# Patient Record
Sex: FEMALE | Race: WHITE | NOT HISPANIC OR LATINO | Employment: FULL TIME | URBAN - METROPOLITAN AREA
[De-identification: names, ages, dates, MRNs, and addresses within clinical notes are randomized per-mention and may not be internally consistent; named-entity substitution may affect disease eponyms.]

---

## 2017-07-31 ENCOUNTER — TELEPHONE (OUTPATIENT)
Dept: OBSTETRICS AND GYNECOLOGY | Facility: CLINIC | Age: 62
End: 2017-07-31

## 2017-08-08 ENCOUNTER — OFFICE VISIT (OUTPATIENT)
Dept: OBSTETRICS AND GYNECOLOGY | Facility: CLINIC | Age: 62
End: 2017-08-08

## 2017-08-08 VITALS
SYSTOLIC BLOOD PRESSURE: 136 MMHG | HEIGHT: 64 IN | BODY MASS INDEX: 27.49 KG/M2 | WEIGHT: 161 LBS | DIASTOLIC BLOOD PRESSURE: 90 MMHG

## 2017-08-08 DIAGNOSIS — F32.89 OTHER DEPRESSION: ICD-10-CM

## 2017-08-08 DIAGNOSIS — Z01.419 WOMEN'S ANNUAL ROUTINE GYNECOLOGICAL EXAMINATION: Primary | ICD-10-CM

## 2017-08-08 PROBLEM — F32.A DEPRESSION: Status: ACTIVE | Noted: 2017-08-08

## 2017-08-08 PROCEDURE — 99396 PREV VISIT EST AGE 40-64: CPT | Performed by: OBSTETRICS & GYNECOLOGY

## 2017-08-08 PROCEDURE — 82272 OCCULT BLD FECES 1-3 TESTS: CPT | Performed by: OBSTETRICS & GYNECOLOGY

## 2017-08-08 RX ORDER — ESTRADIOL 0.5 MG/1
0.5 TABLET ORAL DAILY
Qty: 90 TABLET | Refills: 3 | Status: SHIPPED | OUTPATIENT
Start: 2017-08-08 | End: 2018-10-04 | Stop reason: SDUPTHER

## 2017-08-08 RX ORDER — PREDNISONE 1 MG/1
TABLET ORAL
COMMUNITY
Start: 2017-07-17 | End: 2021-03-09

## 2017-08-08 RX ORDER — CITALOPRAM 20 MG/1
20 TABLET ORAL DAILY
Qty: 90 TABLET | Refills: 1 | Status: SHIPPED | OUTPATIENT
Start: 2017-08-08 | End: 2018-08-08

## 2017-08-08 NOTE — PROGRESS NOTES
"Subjective   Chief Complaint   Patient presents with   • Annual Exam   • Depression     stress     Tiffany Martinez is a 62 y.o. year old  menopausal female presenting to be seen for her annual exam.  There has not been vaginal bleeding in the last 12 months.  Hot flashes and night sweats ARE a significant problem. She has noticed more depression and crying over the [ast month or so,    SEXUAL Hx:  She is sexually active.  Vaginal dryness is not a problem.    HEALTH Hx:  She exercises regularly: no. Active at work  She wears her seat belt:yes.  She has concerns about domestic violence: no.  She has noticed changes in height: no.              Calcium intake is adequate    The following portions of the patient's history were reviewed and updated as appropriate:problem list, current medications, allergies, past family history, past medical history, past social history and past surgical history.    Smoking status: Never Smoker                                                              Smokeless status: Never Used                        Review of Systems normal bladder and bowels     Objective   /90  Ht 64\" (162.6 cm)  Wt 161 lb (73 kg)  BMI 27.64 kg/m2     General:  well developed; well nourished  no acute distress  appears stated age   Skin:  No suspicious lesions seen   Thyroid: normal to inspection and palpation   Breasts:  Examined in supine position  Symmetric without masses or skin dimpling  Nipples normal without inversion, lesions or discharge  There are no palpable axillary nodes   Abdomen: soft, non-tender; no masses  no umbilical or inginual hernias are present  no hepato-splenomegaly   Pelvis: Clinical staff was present for exam  External genitalia:  normal appearance of the external genitalia including Bartholin's and [place]'s glands.  :  urethral meatus normal; urethral hypermobility is absent.  Vaginal:  atrophic mucosal changes are present;  Cervix:  normal appearance.  Uterus:  normal size, " shape and consistency.  Adnexa:  normal bimanual exam of the adnexa.  Rectal:  anus visually normal appearing. recto-vaginal exam unremarkable and confirms findings; guaiac negative;        Assessment   1. Postmenopausal exam - hot flashes despite continued Estrace she's been on this dosage for a while.  2. Recent depression approximately month or so.  Patient does not go into great detail despite more episodes of crying.  This does not sound like anxiety.  I would like to try her on some Celexa and see if that doesn't help.  She is also having some hot flashes and this may help as well.  3. Attempt to exercise more to increase endorphins.  She is active at work but that I discussed that is not the same thing.  4. Continue calcium self breast awareness.  She is up-to-date with mammograms     Plan   1. Have her check with Dr. Dukes to see about thyroid levels - I would think he has checked  with her cholesterol.    New Medications Ordered This Visit   Medications   • predniSONE (DELTASONE) 5 MG tablet   • estradiol (ESTRACE) 0.5 MG tablet     Sig: Take 1 tablet by mouth Daily.     Dispense:  90 tablet     Refill:  3   • progesterone (PROMETRIUM) 100 MG capsule     Sig: Take 1 capsule by mouth Every Night.     Dispense:  90 capsule     Refill:  3   • citalopram (CELEXA) 20 MG tablet     Sig: Take 1 tablet by mouth Daily.     Dispense:  90 tablet     Refill:  1          This note was electronically signed.    Fer Cruz M.D.  August 8, 2017

## 2018-08-02 ENCOUNTER — TRANSCRIBE ORDERS (OUTPATIENT)
Dept: OBSTETRICS AND GYNECOLOGY | Facility: CLINIC | Age: 63
End: 2018-08-02

## 2018-08-02 DIAGNOSIS — Z12.31 VISIT FOR SCREENING MAMMOGRAM: Primary | ICD-10-CM

## 2018-08-21 ENCOUNTER — HOSPITAL ENCOUNTER (OUTPATIENT)
Dept: MAMMOGRAPHY | Facility: HOSPITAL | Age: 63
Discharge: HOME | End: 2018-08-21
Attending: OBSTETRICS & GYNECOLOGY | Admitting: OBSTETRICS & GYNECOLOGY

## 2018-08-21 DIAGNOSIS — Z12.31 VISIT FOR SCREENING MAMMOGRAM: ICD-10-CM

## 2018-08-21 PROCEDURE — 77063 BREAST TOMOSYNTHESIS BI: CPT | Performed by: RADIOLOGY

## 2018-08-21 PROCEDURE — 77067 SCR MAMMO BI INCL CAD: CPT | Performed by: RADIOLOGY

## 2018-08-21 PROCEDURE — 77067 SCR MAMMO BI INCL CAD: CPT

## 2018-08-21 PROCEDURE — 77063 BREAST TOMOSYNTHESIS BI: CPT

## 2018-10-08 RX ORDER — ESTRADIOL 0.5 MG/1
0.5 TABLET ORAL DAILY
Qty: 90 TABLET | Refills: 0 | Status: SHIPPED | OUTPATIENT
Start: 2018-10-08 | End: 2018-12-13 | Stop reason: SDUPTHER

## 2018-12-13 ENCOUNTER — OFFICE VISIT (OUTPATIENT)
Dept: OBSTETRICS AND GYNECOLOGY | Facility: CLINIC | Age: 63
End: 2018-12-13

## 2018-12-13 VITALS
BODY MASS INDEX: 25.23 KG/M2 | SYSTOLIC BLOOD PRESSURE: 118 MMHG | DIASTOLIC BLOOD PRESSURE: 72 MMHG | WEIGHT: 147 LBS | RESPIRATION RATE: 16 BRPM

## 2018-12-13 DIAGNOSIS — N39.3 SUI (STRESS URINARY INCONTINENCE, FEMALE): ICD-10-CM

## 2018-12-13 DIAGNOSIS — N95.1 MENOPAUSAL SYMPTOMS: ICD-10-CM

## 2018-12-13 DIAGNOSIS — Z12.39 ENCOUNTER FOR OTHER SCREENING FOR MALIGNANT NEOPLASM OF BREAST: ICD-10-CM

## 2018-12-13 DIAGNOSIS — Z01.419 WOMEN'S ANNUAL ROUTINE GYNECOLOGICAL EXAMINATION: Primary | ICD-10-CM

## 2018-12-13 PROBLEM — K63.5 COLON POLYP: Status: ACTIVE | Noted: 2018-12-13

## 2018-12-13 PROBLEM — M19.90 ARTHRITIS: Status: ACTIVE | Noted: 2018-12-13

## 2018-12-13 PROCEDURE — 82272 OCCULT BLD FECES 1-3 TESTS: CPT | Performed by: OBSTETRICS & GYNECOLOGY

## 2018-12-13 PROCEDURE — 99396 PREV VISIT EST AGE 40-64: CPT | Performed by: OBSTETRICS & GYNECOLOGY

## 2018-12-13 RX ORDER — ESTRADIOL 0.5 MG/1
0.5 TABLET ORAL DAILY
Qty: 90 TABLET | Refills: 3 | Status: SHIPPED | OUTPATIENT
Start: 2018-12-13 | End: 2019-12-23

## 2018-12-13 NOTE — PROGRESS NOTES
Subjective   Chief Complaint   Patient presents with   • Annual Exam     Tiffany Martinez is a 63 y.o. year old  menopausal female presenting to be seen for her annual exam.  There has not been vaginal bleeding in the last 12 months.  Hot flashes and night sweats are not a significant problem.  EMILIA persits and is a daily issue.  and Kege's not helping also overflow ; double voiding discussed particularly in the morning.    SEXUAL Hx:  She is sexually active.  Vaginal dryness is not a problem.  No pain during intercourse.    HEALTH Hx:  She exercises regularly: yes.  She wears her seat belt:yes.  She has concerns about domestic violence: no.  She has noticed changes in height: no.              Calcium intake is not adequate              Caffeine intake:moderate    The following portions of the patient's history were reviewed and updated as appropriate:problem list, current medications, allergies, past family history, past medical history, past social history and past surgical history.    Social History    Tobacco Use      Smoking status: Never Smoker      Smokeless tobacco: Never Used    Review of Systems   Her bowels are normal     Objective   /72   Resp 16   Wt 66.7 kg (147 lb)   BMI 25.23 kg/m²      General:  well developed; well nourished  no acute distress  appears stated age   Skin:  No suspicious lesions seen   Thyroid: not examined   Breasts:  Examined in supine position  Symmetric without masses or skin dimpling  Nipples normal without inversion, lesions or discharge  There are no palpable axillary nodes   Abdomen: soft, non-tender; no masses  no umbilical or inguinal hernias are present  no hepato-splenomegaly   Pelvis: Clinical staff was present for exam  External genitalia:  normal appearance of the external genitalia including Bartholin's and [place]'s glands.  :  urethral meatus normal;  Vaginal:  atrophic mucosal changes are present;  Uterus:  normal size, shape and consistency.  Adnexa:   non palpable bilaterally.  Rectal:  anus visually normal appearing. recto-vaginal exam unremarkable and confirms findings; no masses; guaiac negative;       Lab Review   Pap test- Mammogram 2018        Assessment   1. Normal postmenopausal examination with exception of;  2. Stress and overflow incontinence.  She is unable to do a Kegel exercise today.  Wears a daily pad.  Discussed double voiding as particularly in the morning to completely empty her bladder.  We'll have her try to do Kegel exercises.  I don't think these are interfering with activities of daily living enough that she would want to have surgery.  Timed voiding discussed to avoid overflow leakage  3. Colonoscopy up-to-date polyp noted 2015  4. She gets her DEXA scans and [facility].     Plan   1. Calcium discussed  1200 mg daily in divided doses ideally in diet  2. Regular weight bearing exercise  3. Breast self awareness, mammograms discussed  4. Annual or sooner as needed  5. I would continue estrogen/progesterone assessment will help avoid dyspareunia and may help with bladder support incontinence issues.    New Medications Ordered This Visit   Medications   • estradiol (ESTRACE) 0.5 MG tablet     Sig: Take 1 tablet by mouth Daily.     Dispense:  90 tablet     Refill:  3     No more refills til appt is made.   • progesterone (PROMETRIUM) 100 MG capsule     Sig: Take 1 capsule by mouth Every Night.     Dispense:  90 capsule     Refill:  3           This note was electronically signed.    Fer Cruz M.D.  December 13, 2018

## 2019-12-23 RX ORDER — ESTRADIOL 0.5 MG/1
TABLET ORAL
Qty: 90 TABLET | Refills: 0 | Status: SHIPPED | OUTPATIENT
Start: 2019-12-23 | End: 2020-01-06

## 2020-01-06 ENCOUNTER — OFFICE VISIT (OUTPATIENT)
Dept: OBSTETRICS AND GYNECOLOGY | Facility: CLINIC | Age: 65
End: 2020-01-06

## 2020-01-06 VITALS
DIASTOLIC BLOOD PRESSURE: 80 MMHG | BODY MASS INDEX: 26.29 KG/M2 | HEIGHT: 64 IN | SYSTOLIC BLOOD PRESSURE: 130 MMHG | WEIGHT: 154 LBS

## 2020-01-06 DIAGNOSIS — M85.80 OSTEOPENIA, SENILE: ICD-10-CM

## 2020-01-06 DIAGNOSIS — Z12.39 SCREENING FOR BREAST CANCER: ICD-10-CM

## 2020-01-06 DIAGNOSIS — N39.3 SUI (STRESS URINARY INCONTINENCE, FEMALE): ICD-10-CM

## 2020-01-06 DIAGNOSIS — Z01.419 ENCOUNTER FOR WELL WOMAN EXAM WITH ROUTINE GYNECOLOGICAL EXAM: Primary | ICD-10-CM

## 2020-01-06 PROCEDURE — 99396 PREV VISIT EST AGE 40-64: CPT | Performed by: OBSTETRICS & GYNECOLOGY

## 2020-01-06 RX ORDER — MELOXICAM 15 MG/1
TABLET ORAL
COMMUNITY
Start: 2019-12-19

## 2020-01-06 RX ORDER — ALENDRONATE SODIUM 70 MG/1
TABLET ORAL
COMMUNITY
Start: 2019-12-19 | End: 2022-12-29

## 2020-01-06 RX ORDER — ROSUVASTATIN CALCIUM 5 MG/1
TABLET, COATED ORAL
COMMUNITY
End: 2022-12-29

## 2020-01-06 RX ORDER — FAMOTIDINE 20 MG/1
TABLET, FILM COATED ORAL
COMMUNITY
End: 2023-03-01

## 2020-01-06 RX ORDER — PREDNISONE 1 MG/1
TABLET ORAL
COMMUNITY
Start: 2019-12-19 | End: 2023-03-01

## 2020-01-06 RX ORDER — ALBUTEROL SULFATE 90 UG/1
AEROSOL, METERED RESPIRATORY (INHALATION)
COMMUNITY
End: 2023-03-01

## 2020-01-06 RX ORDER — ESTRADIOL 1 MG/1
1 TABLET ORAL DAILY
Qty: 90 TABLET | Refills: 3 | Status: SHIPPED | OUTPATIENT
Start: 2020-01-06 | End: 2021-01-04

## 2020-01-06 RX ORDER — ERGOCALCIFEROL 1.25 MG/1
1 CAPSULE ORAL
COMMUNITY
Start: 2019-12-26 | End: 2023-03-01

## 2020-01-06 NOTE — PROGRESS NOTES
Subjective   Chief Complaint   Patient presents with   • Annual Exam     Tiffany Martinez is a 64 y.o. year old  menopausal female presenting to be seen for her annual exam.  There has not been vaginal bleeding in the last 12 months.  Hot flashes and night sweats ARE a significant problem especially at night sleeps without covers   She would like to consider increasing the dosage as this is causing problems with sleep.  Her  has to turn up the heat for him to sleep and that makes things worse for her.  Her stress incontinence is about the same.  Only really bad when she has a bad cough she does have cough medication at home.  Discussed that we could call in Tessalon Perles if the medication she has at home was ineffective.  Kegel's have not been helpful for her.  Problems with constipation related to diet.  She had a bone density study at rheumatology Dr. Wong she thinks that showed osteopenia.  I may want to get a copy that to us for our records.  We will give her some information regarding calcium diet information and discussed weightbearing exercise in addition to calcium and vitamin D as apparently that was low as well.    SEXUAL Hx:  She is sexually active.  Vaginal dryness is a problem.  Alianza is painful:no  She has concerns about domestic violence: no    HEALTH Hx:  She exercises regularly: yes.  She wears her seat belt:yes.  Self breast awareness: yes  She has noticed changes in height: no              Calcium intake is not adequate 1 daily              Caffeine intake: caffeine use is moderate-to-high daily; 2 coffee and  16 oz tea no lex     The following portions of the patient's history were reviewed and updated as appropriate:problem list, current medications, allergies, past family history, past medical history, past social history and past surgical history.    Current Outpatient Medications:   •  albuterol sulfate  (90 Base) MCG/ACT inhaler, albuterol sulfate HFA 90  mcg/actuation aerosol inhaler, Disp: , Rfl:   •  alendronate (FOSAMAX) 70 MG tablet, , Disp: , Rfl:   •  amLODIPine (NORVASC) 5 MG tablet, Take 5 mg by mouth daily., Disp: , Rfl:   •  DEXILANT 60 MG capsule, Take 60 mg by mouth daily., Disp: , Rfl:   •  diclofenac (VOLTAREN) 1 % gel gel, Apply 1 g topically daily., Disp: , Rfl:   •  ENBREL SURECLICK 50 MG/ML solution auto-injector, Inject 50 mg under the skin every 7 days., Disp: , Rfl:   •  famotidine (PEPCID) 20 MG tablet, Pepcid 20 mg tablet  Daily, Disp: , Rfl:   •  folic acid (FOLVITE) 1 MG tablet, Take 1 mg by mouth 5 (five) times a day., Disp: , Rfl:   •  meloxicam (MOBIC) 15 MG tablet, meloxicam 15 mg tablet, Disp: , Rfl:   •  methotrexate 2.5 MG tablet, Take 2.5 mg by mouth 1 (one) time as needed. 8 tabs weekly, Disp: , Rfl:   •  methotrexate 2.5 MG tablet, methotrexate sodium 2.5 mg tablet, Disp: , Rfl:   •  omeprazole (PriLOSEC) 20 MG capsule, Take 20 mg by mouth daily., Disp: , Rfl:   •  pilocarpine (SALAGEN) 5 MG tablet, Take 5 mg by mouth daily., Disp: , Rfl:   •  predniSONE (DELTASONE) 1 MG tablet, prednisone 1 mg tablet, Disp: , Rfl:   •  predniSONE (DELTASONE) 5 MG tablet, , Disp: , Rfl:   •  rosuvastatin (CRESTOR) 5 MG tablet, rosuvastatin 5 mg tablet, Disp: , Rfl:   •  vitamin D (ERGOCALCIFEROL) 1.25 MG (31992 UT) capsule capsule, Take 1 capsule by mouth., Disp: , Rfl:   •  estradiol (ESTRACE) 1 MG tablet, Take 1 tablet by mouth Daily., Disp: 90 tablet, Rfl: 3  •  progesterone (PROMETRIUM) 200 MG capsule, Take 1 capsule by mouth Daily., Disp: 90 capsule, Rfl: 3    Social History    Tobacco Use      Smoking status: Never Smoker      Smokeless tobacco: Never Used    Social History     Substance and Sexual Activity   Alcohol Use Yes    Comment: wine       Review of systems  Constitutional   POS night sweats                           NEG fatigue, fevers and malaise  Breast               POS nothing reported                           NEG persistent  "breast lump, skin dimpling or nipple discharge  GI                     POS constipation (chronic) and manages with diet                           NEG bloating, change in bowel habits, melena or reflux symptoms                      POS EMILIA is present but it IS NOT effecting her ADL's                           NEG dysuria, frequency or hematuria         Objective   /80   Ht 161.9 cm (63.75\")   Wt 69.9 kg (154 lb)   Breastfeeding No   BMI 26.64 kg/m²      General:  well developed; well nourished  no acute distress  appears stated age   Skin:  No suspicious lesions seen   Thyroid: not examined   Breasts:  Examined in supine position  Symmetric without masses or skin dimpling  Nipples normal without inversion, lesions or discharge  Fibrocystic changes are present both breasts without a discrete mass   Abdomen: soft, non-tender; no masses  no umbilical or inguinal hernias are present  no hepato-splenomegaly   Pelvis: Clinical staff was present for exam  External genitalia:  normal appearance of the external genitalia including Bartholin's and [place]'s glands.  :  urethral meatus normal;  Vaginal:  atrophic mucosal changes are present; she is not able to perform a Kegel contraction upon request;  Cervix:  normal appearance. Pap obtained  Uterus:  normal size, shape and consistency.  Adnexa:  non palpable bilaterally.       Lab Review   Pap test and PATHOLOGY    Imaging review  Mammogram report  Pelvic ultrasound report       Assessment   1. Postmenopausal examination status post ablation 2016.  She had postmenopausal bleeding due to endometrial polyps.  No bleeding since then.  2. Discussed Pap smear screening can stop about age 66 she may have had an abnormal one in the past.  Potentially she could do one in 2 or 3 years and if they are normal she can stop at that point.  3. Osteopenia managed by rheumatology.  I would like to get a copy of the report will give her some information regarding calcium in her " diet  4. Symptomatic hot flashes will increase dose of estradiol and Prometrium to take at night  5. Last mammogram was in August 2018 so we will get that scheduled.  6. She has a new primary care doctor at [facility] as Dr. Dukes has retired.       Plan   1. Annual or sooner as needed  2. Calcium discussed  1200 mg daily in divided doses ideally in diet  3. Regular weight bearing exercise  4. Breast self awareness, mammograms discussed  5. Colonoscopy discussed  6.       New Medications Ordered This Visit   Medications   • estradiol (ESTRACE) 1 MG tablet     Sig: Take 1 tablet by mouth Daily.     Dispense:  90 tablet     Refill:  3   • progesterone (PROMETRIUM) 200 MG capsule     Sig: Take 1 capsule by mouth Daily.     Dispense:  90 capsule     Refill:  3      Orders Placed This Encounter   Procedures   • Mammo Screening Digital Tomosynthesis Bilateral With CAD     Standing Status:   Future     Standing Expiration Date:   1/6/2021     Order Specific Question:   Reason for Exam:     Answer:   s              This note was electronically signed.    Fer Cruz M.D.  January 6, 2020

## 2021-01-04 RX ORDER — ESTRADIOL 1 MG/1
TABLET ORAL
Qty: 30 TABLET | Refills: 0 | Status: SHIPPED | OUTPATIENT
Start: 2021-01-04 | End: 2021-03-08

## 2021-03-02 PROBLEM — N83.201 RIGHT OVARIAN CYST: Status: ACTIVE | Noted: 2021-03-02

## 2021-03-08 RX ORDER — ESTRADIOL 1 MG/1
TABLET ORAL
Qty: 30 TABLET | Refills: 0 | Status: SHIPPED | OUTPATIENT
Start: 2021-03-08 | End: 2021-03-09 | Stop reason: SDUPTHER

## 2021-03-09 ENCOUNTER — OFFICE VISIT (OUTPATIENT)
Dept: OBSTETRICS AND GYNECOLOGY | Facility: CLINIC | Age: 66
End: 2021-03-09

## 2021-03-09 VITALS
DIASTOLIC BLOOD PRESSURE: 70 MMHG | SYSTOLIC BLOOD PRESSURE: 128 MMHG | BODY MASS INDEX: 27.16 KG/M2 | WEIGHT: 157 LBS | RESPIRATION RATE: 16 BRPM

## 2021-03-09 DIAGNOSIS — Z01.411 ENCOUNTER FOR GYNECOLOGICAL EXAMINATION WITH ABNORMAL FINDING: ICD-10-CM

## 2021-03-09 DIAGNOSIS — N83.201 RIGHT OVARIAN CYST: Primary | ICD-10-CM

## 2021-03-09 DIAGNOSIS — Z12.31 ENCOUNTER FOR SCREENING MAMMOGRAM FOR MALIGNANT NEOPLASM OF BREAST: ICD-10-CM

## 2021-03-09 DIAGNOSIS — N95.1 MENOPAUSAL SYMPTOMS: ICD-10-CM

## 2021-03-09 DIAGNOSIS — N39.3 SUI (STRESS URINARY INCONTINENCE, FEMALE): ICD-10-CM

## 2021-03-09 DIAGNOSIS — M85.80 OSTEOPENIA, SENILE: ICD-10-CM

## 2021-03-09 PROCEDURE — 99396 PREV VISIT EST AGE 40-64: CPT | Performed by: OBSTETRICS & GYNECOLOGY

## 2021-03-09 RX ORDER — ESTRADIOL 1 MG/1
1 TABLET ORAL DAILY
Qty: 90 TABLET | Refills: 0 | Status: SHIPPED | OUTPATIENT
Start: 2021-03-09

## 2021-03-09 RX ORDER — ESTRADIOL 1 MG/1
1 TABLET ORAL DAILY
Qty: 90 TABLET | Refills: 3 | Status: SHIPPED | OUTPATIENT
Start: 2021-03-09 | End: 2021-03-09

## 2021-03-09 NOTE — PROGRESS NOTES
Subjective   Chief Complaint   Patient presents with   • Annual Exam   • Ovarian Cyst     Tiffany Martinez is a 65 y.o. year old  menopausal female presenting to be seen for her annual exam.  There has not been vaginal bleeding in the last 12 months.  Hot flashes and night sweats are not a significant problem.  As long she is on Estrace and Prometrium.  She is also on Fosamax/alendronate for osteopenia.  Discussed calcium, vitamin D and avoidance of excessive caffeine along with exercise.   She recently had a colonoscopy at [facility] by Dr. Arceo.  Polyp was removed.  CT was ordered and 4.5 cm right ovarian simple cyst was noted.  Upon review in 2016 on ultrasound she also had a 4.4 cm right ovarian cyst that was simple.  Likely unchanged.  We can check a CA-125 level to be on the safe side.  Apparently that might not have been done in 2016.  She is up-to-date on Pap testing  Has not had a mammogram since 2018 although one was ordered in 2020?    SEXUAL Hx:  She is sexually active.  Vaginal dryness is a problem.  Marcy is painful:yes a little bit ; OTC lubricants help  She has concerns about domestic violence: no  Discussed risk for STD and sexual behavior.    HEALTH Hx:  She exercises regularly: yes.  She wears her seat belt:yes.  Self breast awareness: yes  She has noticed changes in height: yes 0.5 inches?               Calcium intake is adequate 4 daily              Caffeine intake: caffeine use is moderate-to-high daily              Discussed immunizations, screenings, mental and dental health.    The following portions of the patient's history were reviewed and updated as appropriate:problem list, current medications, allergies, past family history, past medical history, past social history and past surgical history.      Current Outpatient Medications:   •  albuterol sulfate  (90 Base) MCG/ACT inhaler, albuterol sulfate HFA 90 mcg/actuation aerosol inhaler, Disp: , Rfl:   •   alendronate (FOSAMAX) 70 MG tablet, , Disp: , Rfl:   •  amLODIPine (NORVASC) 5 MG tablet, Take 5 mg by mouth daily., Disp: , Rfl:   •  DEXILANT 60 MG capsule, Take 60 mg by mouth daily., Disp: , Rfl:   •  diclofenac (VOLTAREN) 1 % gel gel, Apply 1 g topically daily., Disp: , Rfl:   •  ENBREL SURECLICK 50 MG/ML solution auto-injector, Inject 50 mg under the skin every 7 days., Disp: , Rfl:   •  famotidine (PEPCID) 20 MG tablet, Pepcid 20 mg tablet  Daily, Disp: , Rfl:   •  folic acid (FOLVITE) 1 MG tablet, Take 1 mg by mouth 5 (five) times a day., Disp: , Rfl:   •  meloxicam (MOBIC) 15 MG tablet, meloxicam 15 mg tablet, Disp: , Rfl:   •  methotrexate 2.5 MG tablet, Take 2.5 mg by mouth 1 (one) time as needed. 8 tabs weekly, Disp: , Rfl:   •  omeprazole (PriLOSEC) 20 MG capsule, Take 20 mg by mouth daily., Disp: , Rfl:   •  pilocarpine (SALAGEN) 5 MG tablet, Take 5 mg by mouth daily., Disp: , Rfl:   •  predniSONE (DELTASONE) 1 MG tablet, prednisone 1 mg tablet, Disp: , Rfl:   •  rosuvastatin (CRESTOR) 5 MG tablet, rosuvastatin 5 mg tablet, Disp: , Rfl:   •  vitamin D (ERGOCALCIFEROL) 1.25 MG (89365 UT) capsule capsule, Take 1 capsule by mouth., Disp: , Rfl:   •  estradiol (Estrace) 1 MG tablet, Take 1 tablet by mouth Daily., Disp: 90 tablet, Rfl: 0  •  progesterone (Prometrium) 200 MG capsule, Take 1 capsule by mouth Every Night., Disp: 90 capsule, Rfl: 0    Social History    Tobacco Use      Smoking status: Current Every Day Smoker        Packs/day: 1.00      Smokeless tobacco: Never Used      Tobacco comment: going to quit  she plans to start using a patch to help her quit smoking.    Social History     Substance and Sexual Activity   Alcohol Use Not Currently     Discussed avoidance of illicit drugs    Review of systems  Constitutional   POS weight gain                           NEG fatigue, fevers, malaise and night sweats  Breast               POS nothing reported                           NEG persistent breast  lump, skin dimpling or nipple discharge  GI                     POS nothing reported and had colonoscopy in the past 10 year - results are not in record for review                           NEG bloating, change in bowel habits, melena or reflux symptoms                      POS EMILIA is present but it IS NOT effecting her ADL's more first thing in the morning; no daily pad                           NEG dysuria, frequency or hematuria           Objective   /70   Resp 16   Wt 71.2 kg (157 lb)   BMI 27.16 kg/m²      General:  well developed; well nourished  no acute distress  appears stated age   Skin:  No suspicious lesions seen   Thyroid: not examined   Breasts:  Examined in supine position  Symmetric without masses or skin dimpling  Nipples normal without inversion, lesions or discharge  Fibrocystic changes are present both breasts without a discrete mass   Abdomen: soft, non-tender; no masses  no umbilical or inguinal hernias are present  no hepato-splenomegaly   Pelvis: Clinical staff was present for exam  External genitalia:  normal appearance of the external genitalia including Bartholin's and [place]'s glands.  :  urethral meatus normal;  Vaginal:  normal pink mucosa without prolapse or lesions. caliber of the introitus is Slightly narrowed; she is not able to perform a Kegel contraction upon request;  Cervix:  cervical motion tenderness is absent;  Uterus:  normal size, shape and consistency.  Adnexa:  There is a fullness on the right side.  Rectal:  digital rectal exam not performed; anus visually normal appearing.       Lab Review   Pap test and PATHOLOGY    Imaging review  CT of abdomen/pelvis report  Mammogram report  Pelvic ultrasound report   [facility] scan [facility]               Assessment     1. Normal postmenopausal examination with the exception of  2. 4.5 cm simple right ovarian cyst seen on recent CT scan.  Apparently same size as a ultrasound in 2016  3. Occasional stress urinary  incontinence especially in the morning suggest a double voiding.  She is unable to do a Kegel.  Occasionally has to wear a pad.  Discussed options if this worsens would include trial of a pessary versus surgery  4. Osteopenia BN treated at the [facility] Center  5.    Up-to-date on colonoscopy, needs mammogram       Plan     1. Annual or sooner as needed  2. Calcium discussed  1200 mg daily in divided doses ideally in diet  3. Regular weight bearing exercise, nutrition, injury avoidance and maintaining healthy weight discussed  4. Breast self awareness and mammograms discussed  5. Colonoscopy discussed  6. I will refill HRT for 3 months to give her time to have her mammogram completed.    New Medications Ordered This Visit   Medications   • estradiol (Estrace) 1 MG tablet     Sig: Take 1 tablet by mouth Daily.     Dispense:  90 tablet     Refill:  0   • progesterone (Prometrium) 200 MG capsule     Sig: Take 1 capsule by mouth Every Night.     Dispense:  90 capsule     Refill:  0      Orders Placed This Encounter   Procedures   • Mammo Screening Digital Tomosynthesis Bilateral With CAD     Standing Status:   Future     Standing Expiration Date:   3/9/2022     Order Specific Question:   Reason for Exam:     Answer:   l   •      Standing Status:   Future     Standing Expiration Date:   3/9/2022              This note was electronically signed.    Fer Cruz M.D.  March 9, 2021

## 2022-10-08 ENCOUNTER — HOSPITAL ENCOUNTER (EMERGENCY)
Facility: HOSPITAL | Age: 67
Discharge: HOME | End: 2022-10-08
Attending: EMERGENCY MEDICINE | Admitting: EMERGENCY MEDICINE

## 2022-10-08 ENCOUNTER — APPOINTMENT (OUTPATIENT)
Dept: CT IMAGING | Facility: HOSPITAL | Age: 67
End: 2022-10-08

## 2022-10-08 VITALS
SYSTOLIC BLOOD PRESSURE: 177 MMHG | HEART RATE: 67 BPM | RESPIRATION RATE: 20 BRPM | HEIGHT: 64 IN | BODY MASS INDEX: 27.66 KG/M2 | WEIGHT: 162 LBS | DIASTOLIC BLOOD PRESSURE: 66 MMHG | OXYGEN SATURATION: 99 % | TEMPERATURE: 98.2 F

## 2022-10-08 DIAGNOSIS — N83.201 BILATERAL OVARIAN CYSTS: ICD-10-CM

## 2022-10-08 DIAGNOSIS — N20.1 RIGHT URETERAL STONE: Primary | ICD-10-CM

## 2022-10-08 DIAGNOSIS — N83.202 BILATERAL OVARIAN CYSTS: ICD-10-CM

## 2022-10-08 DIAGNOSIS — N23 RENAL COLIC ON RIGHT SIDE: ICD-10-CM

## 2022-10-08 LAB
ALBUMIN SERPL-MCNC: 4.2 G/DL (ref 3.5–5.2)
ALBUMIN/GLOB SERPL: 1.4 G/DL
ALP SERPL-CCNC: 76 U/L (ref 39–117)
ALT SERPL W P-5'-P-CCNC: 11 U/L (ref 1–33)
ANION GAP SERPL CALCULATED.3IONS-SCNC: 12.9 MMOL/L (ref 5–15)
AST SERPL-CCNC: 20 U/L (ref 1–32)
BACTERIA UR QL AUTO: ABNORMAL /HPF
BASOPHILS # BLD AUTO: 0.03 10*3/MM3 (ref 0–0.2)
BASOPHILS NFR BLD AUTO: 0.3 % (ref 0–1.5)
BILIRUB SERPL-MCNC: 0.4 MG/DL (ref 0–1.2)
BILIRUB UR QL STRIP: NEGATIVE
BUN SERPL-MCNC: 18 MG/DL (ref 8–23)
BUN/CREAT SERPL: 19.4 (ref 7–25)
CALCIUM SPEC-SCNC: 9.6 MG/DL (ref 8.6–10.5)
CHLORIDE SERPL-SCNC: 105 MMOL/L (ref 98–107)
CLARITY UR: CLEAR
CO2 SERPL-SCNC: 22.1 MMOL/L (ref 22–29)
COLOR UR: YELLOW
CREAT SERPL-MCNC: 0.93 MG/DL (ref 0.57–1)
DEPRECATED RDW RBC AUTO: 46.2 FL (ref 37–54)
EGFRCR SERPLBLD CKD-EPI 2021: 67.9 ML/MIN/1.73
EOSINOPHIL # BLD AUTO: 0.01 10*3/MM3 (ref 0–0.4)
EOSINOPHIL NFR BLD AUTO: 0.1 % (ref 0.3–6.2)
ERYTHROCYTE [DISTWIDTH] IN BLOOD BY AUTOMATED COUNT: 13.4 % (ref 12.3–15.4)
GLOBULIN UR ELPH-MCNC: 3.1 GM/DL
GLUCOSE SERPL-MCNC: 115 MG/DL (ref 65–99)
GLUCOSE UR STRIP-MCNC: NEGATIVE MG/DL
HCT VFR BLD AUTO: 40.5 % (ref 34–46.6)
HGB BLD-MCNC: 13.9 G/DL (ref 12–15.9)
HGB UR QL STRIP.AUTO: ABNORMAL
HYALINE CASTS UR QL AUTO: ABNORMAL /LPF
IMM GRANULOCYTES # BLD AUTO: 0.04 10*3/MM3 (ref 0–0.05)
IMM GRANULOCYTES NFR BLD AUTO: 0.4 % (ref 0–0.5)
KETONES UR QL STRIP: ABNORMAL
LEUKOCYTE ESTERASE UR QL STRIP.AUTO: NEGATIVE
LIPASE SERPL-CCNC: 21 U/L (ref 13–60)
LYMPHOCYTES # BLD AUTO: 0.78 10*3/MM3 (ref 0.7–3.1)
LYMPHOCYTES NFR BLD AUTO: 7.8 % (ref 19.6–45.3)
MCH RBC QN AUTO: 32 PG (ref 26.6–33)
MCHC RBC AUTO-ENTMCNC: 34.3 G/DL (ref 31.5–35.7)
MCV RBC AUTO: 93.3 FL (ref 79–97)
MONOCYTES # BLD AUTO: 0.55 10*3/MM3 (ref 0.1–0.9)
MONOCYTES NFR BLD AUTO: 5.5 % (ref 5–12)
NEUTROPHILS NFR BLD AUTO: 8.54 10*3/MM3 (ref 1.7–7)
NEUTROPHILS NFR BLD AUTO: 85.9 % (ref 42.7–76)
NITRITE UR QL STRIP: NEGATIVE
NRBC BLD AUTO-RTO: 0 /100 WBC (ref 0–0.2)
PH UR STRIP.AUTO: 6.5 [PH] (ref 5–8)
PLATELET # BLD AUTO: 209 10*3/MM3 (ref 140–450)
PMV BLD AUTO: 10 FL (ref 6–12)
POTASSIUM SERPL-SCNC: 3.9 MMOL/L (ref 3.5–5.2)
PROT SERPL-MCNC: 7.3 G/DL (ref 6–8.5)
PROT UR QL STRIP: NEGATIVE
RBC # BLD AUTO: 4.34 10*6/MM3 (ref 3.77–5.28)
RBC # UR STRIP: ABNORMAL /HPF
REF LAB TEST METHOD: ABNORMAL
SODIUM SERPL-SCNC: 140 MMOL/L (ref 136–145)
SP GR UR STRIP: 1.01 (ref 1–1.03)
SQUAMOUS #/AREA URNS HPF: ABNORMAL /HPF
UROBILINOGEN UR QL STRIP: ABNORMAL
WBC # UR STRIP: ABNORMAL /HPF
WBC NRBC COR # BLD: 9.95 10*3/MM3 (ref 3.4–10.8)

## 2022-10-08 PROCEDURE — 83690 ASSAY OF LIPASE: CPT | Performed by: PHYSICIAN ASSISTANT

## 2022-10-08 PROCEDURE — 81001 URINALYSIS AUTO W/SCOPE: CPT | Performed by: PHYSICIAN ASSISTANT

## 2022-10-08 PROCEDURE — 74176 CT ABD & PELVIS W/O CONTRAST: CPT

## 2022-10-08 PROCEDURE — 85025 COMPLETE CBC W/AUTO DIFF WBC: CPT | Performed by: PHYSICIAN ASSISTANT

## 2022-10-08 PROCEDURE — 99283 EMERGENCY DEPT VISIT LOW MDM: CPT

## 2022-10-08 PROCEDURE — 25010000002 KETOROLAC TROMETHAMINE PER 15 MG: Performed by: PHYSICIAN ASSISTANT

## 2022-10-08 PROCEDURE — 25010000002 ONDANSETRON PER 1 MG: Performed by: PHYSICIAN ASSISTANT

## 2022-10-08 PROCEDURE — 80053 COMPREHEN METABOLIC PANEL: CPT | Performed by: PHYSICIAN ASSISTANT

## 2022-10-08 PROCEDURE — 96375 TX/PRO/DX INJ NEW DRUG ADDON: CPT

## 2022-10-08 PROCEDURE — 96374 THER/PROPH/DIAG INJ IV PUSH: CPT

## 2022-10-08 RX ORDER — LEVOTHYROXINE SODIUM 0.05 MG/1
50 TABLET ORAL DAILY
COMMUNITY
End: 2023-03-01

## 2022-10-08 RX ORDER — KETOROLAC TROMETHAMINE 30 MG/ML
15 INJECTION, SOLUTION INTRAMUSCULAR; INTRAVENOUS ONCE
Status: COMPLETED | OUTPATIENT
Start: 2022-10-08 | End: 2022-10-08

## 2022-10-08 RX ORDER — METOPROLOL SUCCINATE 25 MG/1
25 TABLET, EXTENDED RELEASE ORAL DAILY
COMMUNITY

## 2022-10-08 RX ORDER — TAMSULOSIN HYDROCHLORIDE 0.4 MG/1
1 CAPSULE ORAL DAILY
Qty: 10 CAPSULE | Refills: 0 | Status: SHIPPED | OUTPATIENT
Start: 2022-10-08 | End: 2022-10-18

## 2022-10-08 RX ORDER — HYDROCODONE BITARTRATE AND ACETAMINOPHEN 5; 325 MG/1; MG/1
1 TABLET ORAL ONCE
Status: COMPLETED | OUTPATIENT
Start: 2022-10-08 | End: 2022-10-08

## 2022-10-08 RX ORDER — SODIUM CHLORIDE 0.9 % (FLUSH) 0.9 %
10 SYRINGE (ML) INJECTION AS NEEDED
Status: DISCONTINUED | OUTPATIENT
Start: 2022-10-08 | End: 2022-10-08 | Stop reason: HOSPADM

## 2022-10-08 RX ORDER — ONDANSETRON 4 MG/1
4 TABLET, ORALLY DISINTEGRATING ORAL EVERY 6 HOURS PRN
Qty: 10 TABLET | Refills: 0 | Status: SHIPPED | OUTPATIENT
Start: 2022-10-08 | End: 2022-10-18

## 2022-10-08 RX ORDER — HYDROCODONE BITARTRATE AND ACETAMINOPHEN 5; 325 MG/1; MG/1
1 TABLET ORAL EVERY 6 HOURS PRN
Qty: 12 TABLET | Refills: 0 | Status: SHIPPED | OUTPATIENT
Start: 2022-10-08 | End: 2022-10-11

## 2022-10-08 RX ORDER — ONDANSETRON 2 MG/ML
4 INJECTION INTRAMUSCULAR; INTRAVENOUS ONCE
Status: COMPLETED | OUTPATIENT
Start: 2022-10-08 | End: 2022-10-08

## 2022-10-08 RX ADMIN — SODIUM CHLORIDE 500 ML: 9 INJECTION, SOLUTION INTRAVENOUS at 18:26

## 2022-10-08 RX ADMIN — KETOROLAC TROMETHAMINE 15 MG: 30 INJECTION, SOLUTION INTRAMUSCULAR; INTRAVENOUS at 18:27

## 2022-10-08 RX ADMIN — ONDANSETRON 4 MG: 2 INJECTION INTRAMUSCULAR; INTRAVENOUS at 18:27

## 2022-10-08 RX ADMIN — HYDROCODONE BITARTRATE AND ACETAMINOPHEN 1 TABLET: 5; 325 TABLET ORAL at 20:11

## 2022-10-08 RX ADMIN — LIDOCAINE HYDROCHLORIDE 100 MG: 10 INJECTION, SOLUTION EPIDURAL; INFILTRATION; INTRACAUDAL; PERINEURAL at 18:49

## 2022-10-08 NOTE — DISCHARGE INSTRUCTIONS
You have a kidney stone in your right ureter which is likely causing your acute pain.  Alternate ibuprofen and Tylenol as needed.  For severe pain, may take Norco as directed.  Take Zofran as needed for nausea and vomiting.  May take Flomax to help relax the ureter to aid in passage of the stone.  Drink plenty of fluids to flush out your  system.  Try to follow-up with your gynecologist to reevaluate your bilateral ovarian cyst as scheduled.  If your pain persists or worsens, or you do not pass the kidney stone, may follow-up with our urologist Dr. Willis by calling their office for an appointment.  Return to the ER for any change, worsening symptoms, or any additional concerns including but not limited to severe or worsening pain with fever greater 100.4, inability to urinate, intractable vomiting.

## 2022-10-08 NOTE — ED PROVIDER NOTES
Subjective   History of Present Illness  Patient is a 67-year-old female history of acid reflux, arrhythmia, colon polyps, diverticulosis, hemorrhoids, hyperlipidemia, hypertension, and rheumatoid arthritis presenting to the ER for evaluation of flank pain and vomiting.  Patient states this morning she began having sharp stabbing pain in her right back that radiates around towards her abdomen and groin.  She states that it seems to come and go, intensifies at times.  She states when the pain really hits that she has some associated nausea and vomiting.  She denies any known history of kidney stones previously.  She states she is had an appendectomy, colonoscopy and  in the past.  She states that she had been having some issues with constipation recently but took Linzess and had a normal bowel movement this morning.  Denies any fever, chills, chest pain, cough, shortness of breath, dysuria, known hematuria, or any other symptoms.        Review of Systems   Constitutional: Negative for chills and fever.   HENT: Negative.    Eyes: Negative.    Respiratory: Negative.    Cardiovascular: Negative.    Gastrointestinal: Positive for abdominal pain, nausea and vomiting.   Genitourinary: Positive for flank pain.   Musculoskeletal: Positive for back pain.   Skin: Negative.    Allergic/Immunologic: Negative for immunocompromised state.   Neurological: Negative.    Psychiatric/Behavioral: Negative.        Past Medical History:   Diagnosis Date   • Acid reflux    • Arrhythmia    • Colon polyp 2015   • Diverticulosis 2015   • Hemorrhoids, internal 2015   • Hyperlipidemia    • Hypertension    • Rheumatoid arthritis (HCC)    • Shingles 2020    Left abd/flank/back       Allergies   Allergen Reactions   • Codeine Nausea And Vomiting and Hives       Past Surgical History:   Procedure Laterality Date   •  SECTION      times 2   • COLONOSCOPY W/ POLYPECTOMY  2021   • ENDOSCOPY  2021    [facility]    •  "HYSTEROSCOPY ENDOMETRIAL ABLATION  09/09/2016    D&C polypectomy [facility]   • TUBAL ABDOMINAL LIGATION Bilateral        Family History   Problem Relation Age of Onset   • Heart disease Mother    • Heart attack Father    • Stroke Father 79        on Coumadin   • No Known Problems Brother    • Breast cancer Neg Hx    • Ovarian cancer Neg Hx    • Colon cancer Neg Hx        Social History     Socioeconomic History   • Marital status:    Tobacco Use   • Smoking status: Every Day     Packs/day: 0.50     Types: Cigarettes   • Smokeless tobacco: Never   • Tobacco comments:     going to quit   Vaping Use   • Vaping Use: Never used   Substance and Sexual Activity   • Alcohol use: Not Currently   • Drug use: No   • Sexual activity: Yes     Partners: Male           Objective   Physical Exam  Vitals and nursing note reviewed.     /66 (BP Location: Left arm, Patient Position: Sitting)   Pulse 67   Temp 98.2 °F (36.8 °C) (Oral)   Resp 20   Ht 162.6 cm (64\")   Wt 73.5 kg (162 lb)   SpO2 99%   BMI 27.81 kg/m²     GEN: No acute distress, sitting upright in stretcher.  Awake and alert.  Does not appear septic or toxic.  She is answering questions appropriately.  Head: Normocephalic, atraumatic  Eyes: EOM intact  ENT: Mask in place per protocol   Chest: Nontender to palpation  Cardiovascular: Regular rate and rhythm   Lungs: Clear to auscultation bilaterally without adventitious sounds  Abdomen: Soft, nontender, nondistended, no peritoneal signs, no focal guarding  Extremities: No edema, normal appearance, full ROM.   Neuro: GCS 15  Psych: Mood and affect are appropriate    Procedures           ED Course  ED Course as of 10/09/22 0041   Sat Oct 08, 2022   1844 WBC: 9.95 [LA]   1844 Hemoglobin: 13.9 [LA]   1844 Platelets: 209 [LA]   1853 Glucose(!): 115 [LA]   1853 BUN: 18 [LA]   1853 Creatinine: 0.93 [LA]   1853 Sodium: 140 [LA]   1853 Potassium: 3.9 [LA]   1853 Chloride: 105 [LA]   1853 CO2: 22.1 [LA]   1853 " "Calcium: 9.6 [LA]   1853 Total Protein: 7.3 [LA]   1853 Albumin: 4.20 [LA]   1853 ALT (SGPT): 11 [LA]   1853 AST (SGOT): 20 [LA]   1853 Alkaline Phosphatase: 76 [LA]   1853 Total Bilirubin: 0.4 [LA]   1853 Globulin: 3.1 [LA]   1853 A/G Ratio: 1.4 [LA]   1853 BUN/Creatinine Ratio: 19.4 [LA]   1853 Anion Gap: 12.9 [LA]   1853 eGFR: 67.9 [LA]   1853 Lipase: 21 [LA]   1935 RBC, UA(!): 6-12 [LA]   1935 WBC, UA: None Seen [LA]   1935 Bacteria, UA: None Seen [LA]   1935 Squamous Epithelial Cells, UA: 0-2 [LA]   1935 Hyaline Casts, UA: None Seen [LA]   1935 Methodology:: Manual Light Microscopy [LA]   1935 Color, UA: Yellow [LA]   1935 Appearance, UA: Clear [LA]   1935 pH, UA: 6.5 [LA]   1935 Specific Gravity, UA: 1.015 [LA]   1935 Glucose: Negative [LA]   1935 Ketones, UA(!): 15 mg/dL (1+) [LA]   1935 Bilirubin, UA: Negative [LA]   1935 Blood, UA(!): Small (1+) [LA]   1935 Protein, UA: Negative [LA]   1935 Leukocytes, UA: Negative [LA]   1935 Nitrite, UA: Negative [LA]   1935 Urobilinogen, UA: 0.2 E.U./dL [LA]   1936 Ct revealed \" tiny obstructing right UVJ stone.  Small bilateral ovarian cysts\" [LA]   1948 Patient feels better after medications.  She is resting comfortably in the stretcher.  She states she knows about the ovarian cyst and is following up with gynecology in the near future.  We will give her urology follow-up as needed, pain and nausea control.  We discussed very strict return precautions.  She verbalized understanding and was in agreement with this plan of care. [LA]   1955 Patient states that she has taken Norco in the past and small doses without adverse effects. [LA]      ED Course User Index  [LA] Amira Jones PA-C      Lab Results (last 24 hours)     Procedure Component Value Units Date/Time    CBC & Differential [[id number]]  (Abnormal) Collected: 10/08/22 1826    Specimen: Blood Updated: 10/08/22 1833    Narrative:      The following orders were created for panel order CBC & " Differential.  Procedure                               Abnormality         Status                     ---------                               -----------         ------                     CBC Auto Differential[[id number]]        Abnormal            Final result                 Please view results for these tests on the individual orders.    Comprehensive Metabolic Panel [[id number]]  (Abnormal) Collected: 10/08/22 1826    Specimen: Blood Updated: 10/08/22 1852     Glucose 115 mg/dL      BUN 18 mg/dL      Creatinine 0.93 mg/dL      Sodium 140 mmol/L      Potassium 3.9 mmol/L      Chloride 105 mmol/L      CO2 22.1 mmol/L      Calcium 9.6 mg/dL      Total Protein 7.3 g/dL      Albumin 4.20 g/dL      ALT (SGPT) 11 U/L      AST (SGOT) 20 U/L      Alkaline Phosphatase 76 U/L      Total Bilirubin 0.4 mg/dL      Globulin 3.1 gm/dL      A/G Ratio 1.4 g/dL      BUN/Creatinine Ratio 19.4     Anion Gap 12.9 mmol/L      eGFR 67.9 mL/min/1.73      Comment: National Kidney Foundation and American Society of Nephrology (ASN) Task Force recommended calculation based on the Chronic Kidney Disease Epidemiology Collaboration (CKD-EPI) equation refit without adjustment for race.       Narrative:      GFR Normal >60  Chronic Kidney Disease <60  Kidney Failure <15      Lipase [[id number]]  (Normal) Collected: 10/08/22 1826    Specimen: Blood Updated: 10/08/22 1852     Lipase 21 U/L     CBC Auto Differential [[id number]]  (Abnormal) Collected: 10/08/22 1826    Specimen: Blood Updated: 10/08/22 1833     WBC 9.95 10*3/mm3      RBC 4.34 10*6/mm3      Hemoglobin 13.9 g/dL      Hematocrit 40.5 %      MCV 93.3 fL      MCH 32.0 pg      MCHC 34.3 g/dL      RDW 13.4 %      RDW-SD 46.2 fl      MPV 10.0 fL      Platelets 209 10*3/mm3      Neutrophil % 85.9 %      Lymphocyte % 7.8 %      Monocyte % 5.5 %      Eosinophil % 0.1 %      Basophil % 0.3 %      Immature Grans % 0.4 %      Neutrophils, Absolute 8.54 10*3/mm3      Lymphocytes, Absolute 0.78  10*3/mm3      Monocytes, Absolute 0.55 10*3/mm3      Eosinophils, Absolute 0.01 10*3/mm3      Basophils, Absolute 0.03 10*3/mm3      Immature Grans, Absolute 0.04 10*3/mm3      nRBC 0.0 /100 WBC     Urinalysis With Culture If Indicated - Urine, Clean Catch [[id number]]  (Abnormal) Collected: 10/08/22 1918    Specimen: Urine, Clean Catch Updated: 10/08/22 1925     Color, UA Yellow     Appearance, UA Clear     pH, UA 6.5     Specific Gravity, UA 1.015     Glucose, UA Negative     Ketones, UA 15 mg/dL (1+)     Bilirubin, UA Negative     Blood, UA Small (1+)     Protein, UA Negative     Leuk Esterase, UA Negative     Nitrite, UA Negative     Urobilinogen, UA 0.2 E.U./dL    Narrative:      In absence of clinical symptoms, the presence of pyuria, bacteria, and/or nitrites on the urinalysis result does not correlate with infection.    Urinalysis, Microscopic Only - Urine, Clean Catch [[id number]]  (Abnormal) Collected: 10/08/22 1918    Specimen: Urine, Clean Catch Updated: 10/08/22 1934     RBC, UA 6-12 /HPF      WBC, UA None Seen /HPF      Comment: Urine culture not indicated.        Bacteria, UA None Seen /HPF      Squamous Epithelial Cells, UA 0-2 /HPF      Hyaline Casts, UA None Seen /LPF      Methodology Manual Light Microscopy          CT Abdomen Pelvis Without Contrast    Result Date: 10/8/2022  FINAL REPORT TECHNIQUE: Routine axial images through the abdomen and pelvis were obtained. CLINICAL HISTORY: Right flank pain, vomiting FINDINGS: Abdomen:  The gallbladder is normal.  There is no renal calculus, there is stranding of the fat surrounding the right kidney and mild hydroureteronephrosis secondary to a 1 mm calculus at the UVJ, best seen on coronal image 50.  The other solid abdominal organs and left ureter are unremarkable.  The GI tract is unremarkable, with no sign of appendicitis.  Pelvis: There is a 4.2 cm right ovarian cyst.  There is a 1 cm left ovarian cyst. The uterus, ovaries and urinary bladder are  otherwise normal.  There is no pelvic or abdominal ascites, adenopathy or acute osseous abnormality.     Impression: Tiny but obstructing right UVJ stone.  Small bilateral ovarian cysts. Authenticated and Electronically Signed by Issa Disla M.D. on 10/08/2022 09:47:28 PM                                         MDM  Number of Diagnoses or Management Options  Bilateral ovarian cysts  Renal colic on right side  Right ureteral stone  Diagnosis management comments: On arrival, patient does have an elevated blood pressure but is afebrile,, no acute distress.  Differential could include nephrolithiasis, cystitis, colitis, and other concerns.  Patient has had an appendectomy.  Will obtain basic labs, lipase, urinalysis.  Will give pain and nausea control.  Will obtain CT of abdomen pelvis.  Patient's labs stable.  Urine had no signs of infection.  CT was read by the radiologist and revealed bilateral ovarian cysts and a tiny obstructing right ureteral stone which is most likely causing her acute pain.  She states that she is aware of the ovarian cysts and is following up closely with gynecology.  She felt better after medications.  We will send her home with pain and nausea control, Flomax.  Will give urology follow-up as needed, discussed strict return precautions.  She verbalized understanding and was in agreement with this plan of care           Amount and/or Complexity of Data Reviewed  Clinical lab tests: reviewed and ordered  Tests in the radiology section of CPT®: ordered and reviewed  Discussion of test results with the performing providers: yes  Decide to obtain previous medical records or to obtain history from someone other than the patient: yes  Review and summarize past medical records: yes  Discuss the patient with other providers: yes    Risk of Complications, Morbidity, and/or Mortality  Presenting problems: moderate  Diagnostic procedures: moderate  Management options: low    Patient Progress  Patient  progress: improved      Final diagnoses:   Right ureteral stone   Renal colic on right side   Bilateral ovarian cysts       ED Disposition  ED Disposition     ED Disposition   Discharge    Condition   Stable    Comment   --             May Zamora MD  [address] [facility] [address] [phone]    Schedule an appointment as soon as possible for a visit       Theodore Willis MD  [address] [id number] [phone]    Schedule an appointment as soon as possible for a visit   As needed, If symptoms worsen         Medication List      New Prescriptions    HYDROcodone-acetaminophen 5-325 MG per tablet  Commonly known as: NORCO  Take 1 tablet by mouth Every 6 (Six) Hours As Needed for Severe Pain for up to 3 days.     ondansetron ODT 4 MG disintegrating tablet  Commonly known as: ZOFRAN-ODT  Place 1 tablet on the tongue Every 6 (Six) Hours As Needed for Nausea or Vomiting for up to 10 days.     tamsulosin 0.4 MG capsule 24 hr capsule  Commonly known as: FLOMAX  Take 1 capsule by mouth Daily for 10 days.           Where to Get Your Medications      These medications were sent to [id number] - [place], [place] - [address] [place] [address] - [phone]  - [phone] [address] [id number]    Phone: [phone]   · HYDROcodone-acetaminophen 5-325 MG per tablet  · ondansetron ODT 4 MG disintegrating tablet  · tamsulosin 0.4 MG capsule 24 hr capsule          Amira Jones PA-C  10/09/22 0041

## 2022-10-08 NOTE — ED PROVIDER NOTES
Subjective   History of Present Illness  /        Review of Systems    Past Medical History:   Diagnosis Date   • Acid reflux    • Arrhythmia    • Colon polyp 2015   • Diverticulosis 2015   • Hemorrhoids, internal 2015   • Hyperlipidemia    • Hypertension    • Rheumatoid arthritis (CMS/HCC)    • Shingles 2020    Left abd/flank/back       Allergies   Allergen Reactions   • Codeine Nausea And Vomiting and Hives       Past Surgical History:   Procedure Laterality Date   •  SECTION      times 2   • COLONOSCOPY W/ POLYPECTOMY  2021   • ENDOSCOPY  2021    [facility]    • HYSTEROSCOPY ENDOMETRIAL ABLATION  2016    D&C polypectomy [facility]   • TUBAL ABDOMINAL LIGATION Bilateral        Family History   Problem Relation Age of Onset   • Heart disease Mother    • Heart attack Father    • Stroke Father 79        on Coumadin   • No Known Problems Brother    • Breast cancer Neg Hx    • Ovarian cancer Neg Hx    • Colon cancer Neg Hx        Social History     Socioeconomic History   • Marital status:    Tobacco Use   • Smoking status: Every Day     Packs/day: 1.00     Types: Cigarettes   • Smokeless tobacco: Never   • Tobacco comments:     going to quit   Substance and Sexual Activity   • Alcohol use: Not Currently   • Drug use: No   • Sexual activity: Yes     Partners: Male           Objective   Physical Exam    Procedures           ED Course                                           MDM    Final diagnoses:   None       ED Disposition  ED Disposition     None          No follow-up provider specified.       Medication List      No changes were made to your prescriptions during this visit.

## 2022-10-24 ENCOUNTER — OFFICE VISIT (OUTPATIENT)
Dept: UROLOGY | Facility: CLINIC | Age: 67
End: 2022-10-24

## 2022-10-24 VITALS
DIASTOLIC BLOOD PRESSURE: 78 MMHG | SYSTOLIC BLOOD PRESSURE: 120 MMHG | BODY MASS INDEX: 25.95 KG/M2 | WEIGHT: 152 LBS | TEMPERATURE: 97.7 F | HEIGHT: 64 IN | OXYGEN SATURATION: 97 % | HEART RATE: 68 BPM

## 2022-10-24 DIAGNOSIS — N39.0 URINARY TRACT INFECTION WITHOUT HEMATURIA, SITE UNSPECIFIED: Primary | ICD-10-CM

## 2022-10-24 DIAGNOSIS — N20.0 NEPHROLITHIASIS: ICD-10-CM

## 2022-10-24 LAB
BILIRUB BLD-MCNC: NEGATIVE MG/DL
COLOR UR: YELLOW
EXPIRATION DATE: ABNORMAL
GLUCOSE UR STRIP-MCNC: NEGATIVE MG/DL
KETONES UR QL: NEGATIVE
LEUKOCYTE EST, POC: NEGATIVE
Lab: ABNORMAL
NITRITE UR-MCNC: NEGATIVE MG/ML
PH UR: 5.5 [PH] (ref 5–8)
PROT UR STRIP-MCNC: NEGATIVE MG/DL
RBC # UR STRIP: NEGATIVE /UL
SP GR UR: 1.03 (ref 1–1.03)
UROBILINOGEN UR QL: NORMAL

## 2022-10-24 PROCEDURE — 81003 URINALYSIS AUTO W/O SCOPE: CPT | Performed by: NURSE PRACTITIONER

## 2022-10-24 PROCEDURE — 99203 OFFICE O/P NEW LOW 30 MIN: CPT | Performed by: NURSE PRACTITIONER

## 2022-10-24 RX ORDER — ROSUVASTATIN CALCIUM 5 MG/1
1 TABLET, COATED ORAL DAILY
COMMUNITY

## 2022-10-24 RX ORDER — FOLIC ACID 1 MG/1
5 TABLET ORAL
COMMUNITY
Start: 2022-09-19

## 2022-10-24 RX ORDER — MEDROXYPROGESTERONE ACETATE 150 MG/ML
1 INJECTION, SUSPENSION INTRAMUSCULAR
COMMUNITY
Start: 2022-09-19 | End: 2022-12-29 | Stop reason: SDUPTHER

## 2022-10-24 NOTE — PROGRESS NOTES
Office Visit New Stone     Patient Name: Tiffany Martinez  : 1955   MRN: [id number]     Chief Complaint: Kidney Stones.    Chief Complaint   Patient presents with   • Flank Pain       Referring Provider: Griffin Her DO    History of Present Illness: Tiffany Martinez is a 67 y.o. female who presents today for further management of nephrolithiasis.  female presented to ED and was diagnosed with a 1 mm right UVJ stone and 4.2 cm right ovarian cyst. female is doing well today. No fever, chills, nausea, vomiting, hematuria, flank pain, dysuria.  Has occasional discomfort in the right flank and right pelvis.  Reports she passed her stone the next day after going to ED.  She does have an appointment scheduled with gynecology to follow-up on right ovarian cyst.    Stone prevention medications: None    Stone related history  Family history of stones:   yes  Renal disease or anatomic abnormality: no  Malabsorptive disease or gastric bypass: no  Frequent UTI's    no  Parathyroid disease    no    Diet  Low fluid intake    Dietary Considerations  Soda -1 per day  Fast food -2 per week  Water -2 glasses per day  Does not add salt to foods    Subjective      Review of System:   Constitutional: No fevers or chills  Gastrointestinal: No constipation, nausea or vomiting  Genitourinary: Negative for current urinary symptoms  Musculoskeletal: Mild intermittent right flank and pelvic pain      Past Medical History:   Past Medical History:   Diagnosis Date   • Acid reflux    • Arrhythmia    • Colon polyp 2015   • Diverticulosis 2015   • Hemorrhoids, internal 2015   • Hyperlipidemia    • Hypertension    • Rheumatoid arthritis (HCC)    • Shingles 2020    Left abd/flank/back       Past Surgical History:   Past Surgical History:   Procedure Laterality Date   •  SECTION      times 2   • COLONOSCOPY W/ POLYPECTOMY  2021   • ENDOSCOPY  2021    [facility]    • HYSTEROSCOPY ENDOMETRIAL ABLATION   09/09/2016    D&C polypectomy [facility]   • TUBAL ABDOMINAL LIGATION Bilateral        Family History:   Family History   Problem Relation Age of Onset   • Heart disease Mother    • Heart attack Father    • Stroke Father 79        on Coumadin   • No Known Problems Brother    • Breast cancer Neg Hx    • Ovarian cancer Neg Hx    • Colon cancer Neg Hx        Social History:   Social History     Socioeconomic History   • Marital status:    Tobacco Use   • Smoking status: Every Day     Packs/day: 0.50     Types: Cigarettes   • Smokeless tobacco: Never   • Tobacco comments:     going to quit   Vaping Use   • Vaping Use: Never used   Substance and Sexual Activity   • Alcohol use: Not Currently   • Drug use: No   • Sexual activity: Yes     Partners: Male       Medications:     Current Outpatient Medications:   •  albuterol sulfate  (90 Base) MCG/ACT inhaler, albuterol sulfate HFA 90 mcg/actuation aerosol inhaler, Disp: , Rfl:   •  alendronate (FOSAMAX) 70 MG tablet, , Disp: , Rfl:   •  amLODIPine (NORVASC) 5 MG tablet, Take 5 mg by mouth daily., Disp: , Rfl:   •  DEXILANT 60 MG capsule, Take 60 mg by mouth daily., Disp: , Rfl:   •  diclofenac (VOLTAREN) 1 % gel gel, Apply 1 g topically daily., Disp: , Rfl:   •  ENBREL SURECLICK 50 MG/ML solution auto-injector, Inject 50 mg under the skin every 7 days., Disp: , Rfl:   •  estradiol (Estrace) 1 MG tablet, Take 1 tablet by mouth Daily., Disp: 90 tablet, Rfl: 0  •  Etanercept (Enbrel SureClick) 50 MG/ML solution auto-injector, Inject 1 mL under the skin into the appropriate area as directed., Disp: , Rfl:   •  famotidine (PEPCID) 20 MG tablet, Pepcid 20 mg tablet  Daily, Disp: , Rfl:   •  folic acid (FOLVITE) 1 MG tablet, Take 1 mg by mouth 5 (five) times a day., Disp: , Rfl:   •  folic acid (FOLVITE) 1 MG tablet, Take 5 tablets by mouth., Disp: , Rfl:   •  levothyroxine (SYNTHROID, LEVOTHROID) 50 MCG tablet, Take 1 tablet by mouth Daily., Disp: , Rfl:   •   "meloxicam (MOBIC) 15 MG tablet, meloxicam 15 mg tablet, Disp: , Rfl:   •  methotrexate 2.5 MG tablet, Take 2.5 mg by mouth 1 (one) time as needed. 8 tabs weekly, Disp: , Rfl:   •  methotrexate 2.5 MG tablet, Take 8 tablets by mouth 1 (One) Time Per Week., Disp: , Rfl:   •  metoprolol succinate XL (TOPROL-XL) 25 MG 24 hr tablet, Take 1 tablet by mouth Daily., Disp: , Rfl:   •  omeprazole (PriLOSEC) 20 MG capsule, Take 20 mg by mouth daily., Disp: , Rfl:   •  pilocarpine (SALAGEN) 5 MG tablet, Take 5 mg by mouth daily., Disp: , Rfl:   •  predniSONE (DELTASONE) 1 MG tablet, prednisone 1 mg tablet, Disp: , Rfl:   •  progesterone (Prometrium) 200 MG capsule, Take 1 capsule by mouth Every Night., Disp: 90 capsule, Rfl: 0  •  rosuvastatin (CRESTOR) 5 MG tablet, rosuvastatin 5 mg tablet, Disp: , Rfl:   •  vitamin D (ERGOCALCIFEROL) 1.25 MG (30123 UT) capsule capsule, Take 1 capsule by mouth., Disp: , Rfl:   •  rosuvastatin (CRESTOR) 5 MG tablet, Take 1 tablet by mouth Daily., Disp: , Rfl:     Allergies:   Allergies   Allergen Reactions   • Codeine Nausea And Vomiting and Hives       Objective     Physical Exam:   Vital Signs:   Vitals:    10/24/22 1325   BP: 120/78   Pulse: 68   Temp: 97.7 °F (36.5 °C)   SpO2: 97%   Weight: 68.9 kg (152 lb)   Height: 162.6 cm (64\")     Body mass index is 26.09 kg/m².     Physical Exam  Constitutional: NAD, WDWN.   Neurological: A + O x 3    Psychiatric:  Normal mood and affect      Labs  Lab Results   Component Value Date    GLUCOSE 115 (H) 10/08/2022    BUN 18 10/08/2022    CREATININE 0.93 10/08/2022    BCR 19.4 10/08/2022    K 3.9 10/08/2022    CO2 22.1 10/08/2022    CALCIUM 9.6 10/08/2022    ALBUMIN 4.20 10/08/2022    AST 20 10/08/2022    ALT 11 10/08/2022       Lab Results   Component Value Date    WBC 9.95 10/08/2022    HGB 13.9 10/08/2022    HCT 40.5 10/08/2022    MCV 93.3 10/08/2022     10/08/2022       No results found for: LDH, URICACID    Lab Results   Component Value Date "    CALCIUM 9.6 10/08/2022       Brief Urine Lab Results  (Last result in the past 365 days)      Color   Clarity   Blood   Leuk Est   Nitrite   Protein   CREAT   Urine HCG        10/08/22 1918 Yellow   Clear   Small (1+)   Negative   Negative   Negative                 No results found for: URINECX    )No components found for: STONEANALYSI      Radiologic Studies  CT Abdomen Pelvis Without Contrast    Result Date: 10/8/2022  Tiny but obstructing right UVJ stone.  Small bilateral ovarian cysts. Authenticated and Electronically Signed by Issa Disla M.D. on 10/08/2022 09:47:28 PM      I have personally reviewed these labs and images.    Assessment / Plan      Assessment  Ms. Martinez is a 67 y.o. female with nephrolithiasis.  Urine dip is benign today.  Patient passed stone 1 day after ED visit.  We discussed recommendation for follow-up renal ultrasound in 8 weeks to confirm hydronephrosis has resolved due to obstructing stone.  Recommend patient follow-up with gynecology for right ovarian cyst she reports she has an appointment scheduled with a new gynecologist as her previous gynecologist has retired.      Plan  1.  Renal ultrasound 8 weeks prior to appointment      Follow Up:   Return in about 2 months (around 12/24/2022).    AMBIKA Diane  [facility]

## 2022-12-23 ENCOUNTER — APPOINTMENT (OUTPATIENT)
Dept: ULTRASOUND IMAGING | Facility: HOSPITAL | Age: 67
End: 2022-12-23

## 2022-12-29 ENCOUNTER — OFFICE VISIT (OUTPATIENT)
Dept: FAMILY MEDICINE CLINIC | Facility: CLINIC | Age: 67
End: 2022-12-29
Payer: COMMERCIAL

## 2022-12-29 ENCOUNTER — HOSPITAL ENCOUNTER (OUTPATIENT)
Dept: ULTRASOUND IMAGING | Facility: HOSPITAL | Age: 67
End: 2022-12-29

## 2022-12-29 VITALS
HEIGHT: 64 IN | BODY MASS INDEX: 28.17 KG/M2 | TEMPERATURE: 97.5 F | DIASTOLIC BLOOD PRESSURE: 80 MMHG | HEART RATE: 64 BPM | WEIGHT: 165 LBS | SYSTOLIC BLOOD PRESSURE: 128 MMHG | OXYGEN SATURATION: 99 %

## 2022-12-29 DIAGNOSIS — J01.40 ACUTE NON-RECURRENT PANSINUSITIS: Primary | ICD-10-CM

## 2022-12-29 DIAGNOSIS — H66.003 NON-RECURRENT ACUTE SUPPURATIVE OTITIS MEDIA OF BOTH EARS WITHOUT SPONTANEOUS RUPTURE OF TYMPANIC MEMBRANES: ICD-10-CM

## 2022-12-29 PROBLEM — E78.5 HYPERLIPIDEMIA: Status: ACTIVE | Noted: 2022-12-29

## 2022-12-29 PROBLEM — M06.9 RHEUMATOID ARTHRITIS: Status: ACTIVE | Noted: 2018-12-13

## 2022-12-29 PROBLEM — I49.9 ARRHYTHMIA: Status: ACTIVE | Noted: 2022-12-29

## 2022-12-29 PROBLEM — K21.9 ACID REFLUX: Status: ACTIVE | Noted: 2022-12-29

## 2022-12-29 PROBLEM — I10 HYPERTENSION: Status: ACTIVE | Noted: 2022-12-29

## 2022-12-29 PROBLEM — B02.9 SHINGLES: Status: ACTIVE | Noted: 2020-01-01

## 2022-12-29 PROCEDURE — 99213 OFFICE O/P EST LOW 20 MIN: CPT | Performed by: NURSE PRACTITIONER

## 2022-12-29 RX ORDER — LINACLOTIDE 145 UG/1
145 CAPSULE, GELATIN COATED ORAL DAILY
COMMUNITY
Start: 2022-11-10

## 2022-12-29 RX ORDER — AMOXICILLIN 500 MG/1
1000 CAPSULE ORAL 2 TIMES DAILY
Qty: 20 CAPSULE | Refills: 0 | Status: SHIPPED | OUTPATIENT
Start: 2022-12-29 | End: 2023-01-03

## 2022-12-29 RX ORDER — OMEPRAZOLE 40 MG/1
25 CAPSULE, DELAYED RELEASE ORAL DAILY
COMMUNITY
Start: 2022-11-10 | End: 2022-12-29

## 2022-12-29 NOTE — PROGRESS NOTES
New Patient History and Physical      Referring Physician: No ref. provider found    Chief Complaint:    Chief Complaint   Patient presents with   • Establish Care   • Earache     Cough and congestion       History of Present Illness:   Tiffany Martinez is a 67 y.o. female who presents today as a new patient. Patient has acute concerns today of productive cough, bilateral ear pain, congestion, clear mucous. Patient was seen previously at [facility].     Subjective     Review of Systems   Constitutional: Negative for fatigue and fever.   HENT: Positive for congestion, ear pain, postnasal drip, rhinorrhea and sore throat. Negative for ear discharge.    Respiratory: Positive for cough. Negative for shortness of breath and wheezing.    Cardiovascular: Negative for chest pain and palpitations.   Gastrointestinal: Negative for diarrhea, nausea and vomiting.   Musculoskeletal: Negative for myalgias.   Neurological: Negative for weakness and headaches.   All other systems reviewed and are negative.       The following portions of the patient's history were reviewed and updated as appropriate: allergies, current medications, past family history, past medical history, past social history, past surgical history and problem list.    Past Medical History:   Past Medical History:   Diagnosis Date   • Acid reflux    • Arrhythmia    • Colon polyp 2015   • Diverticulosis 2015   • Hemorrhoids, internal 2015   • Hyperlipidemia    • Hypertension    • Rheumatoid arthritis (HCC)    • Shingles 2020    Left abd/flank/back       Past Surgical History:  Past Surgical History:   Procedure Laterality Date   •  SECTION      times 2   • COLONOSCOPY W/ POLYPECTOMY  2021   • ENDOSCOPY  2021    [facility]    • HYSTEROSCOPY ENDOMETRIAL ABLATION  2016    D&C polypectomy [facility]   • TUBAL ABDOMINAL LIGATION Bilateral        Family History: family history includes Heart attack in her father; Heart  disease in her mother; No Known Problems in her brother; Stroke (age of onset: 79) in her father.    Social History:  reports that she quit smoking about 2 weeks ago. Her smoking use included cigarettes. She smoked an average of .5 packs per day. She has never used smokeless tobacco. She reports that she does not currently use alcohol. She reports that she does not use drugs.    Medications:    Current Outpatient Medications:   •  albuterol sulfate  (90 Base) MCG/ACT inhaler, albuterol sulfate HFA 90 mcg/actuation aerosol inhaler, Disp: , Rfl:   •  amLODIPine (NORVASC) 5 MG tablet, Take 5 mg by mouth daily., Disp: , Rfl:   •  Diclofenac Sodium (VOLTAREN) 1 % gel gel, APPLY 2 GRAMS TO AFFECTED AREA 4 TIMES A DAY AS NEEDED, Disp: , Rfl:   •  ENBREL SURECLICK 50 MG/ML solution auto-injector, Inject 50 mg under the skin every 7 days., Disp: , Rfl:   •  estradiol (Estrace) 1 MG tablet, Take 1 tablet by mouth Daily., Disp: 90 tablet, Rfl: 0  •  famotidine (PEPCID) 20 MG tablet, Pepcid 20 mg tablet  Daily, Disp: , Rfl:   •  folic acid (FOLVITE) 1 MG tablet, Take 5 tablets by mouth., Disp: , Rfl:   •  levothyroxine (SYNTHROID, LEVOTHROID) 50 MCG tablet, Take 1 tablet by mouth Daily., Disp: , Rfl:   •  Linzess 145 MCG capsule capsule, Take 145 mcg by mouth Daily., Disp: , Rfl:   •  meloxicam (MOBIC) 15 MG tablet, meloxicam 15 mg tablet, Disp: , Rfl:   •  methotrexate 2.5 MG tablet, Take 2.5 mg by mouth 1 (one) time as needed. 8 tabs weekly, Disp: , Rfl:   •  metoprolol succinate XL (TOPROL-XL) 25 MG 24 hr tablet, Take 1 tablet by mouth Daily., Disp: , Rfl:   •  omeprazole (PriLOSEC) 20 MG capsule, Take 20 mg by mouth daily., Disp: , Rfl:   •  pilocarpine (SALAGEN) 5 MG tablet, Take 5 mg by mouth daily., Disp: , Rfl:   •  predniSONE (DELTASONE) 1 MG tablet, prednisone 1 mg tablet, Disp: , Rfl:   •  progesterone (Prometrium) 200 MG capsule, Take 1 capsule by mouth Every Night., Disp: 90 capsule, Rfl: 0  •  rosuvastatin  (CRESTOR) 5 MG tablet, Take 1 tablet by mouth Daily., Disp: , Rfl:   •  vitamin D (ERGOCALCIFEROL) 1.25 MG (24526 UT) capsule capsule, Take 1 capsule by mouth., Disp: , Rfl:   •  amoxicillin (AMOXIL) 500 MG capsule, Take 2 capsules by mouth 2 (Two) Times a Day for 5 days., Disp: 20 capsule, Rfl: 0    Allergies:  Allergies   Allergen Reactions   • Codeine Nausea And Vomiting and Hives       Objective     Vital Signs:   /80 (BP Location: Left arm, Patient Position: Sitting, Cuff Size: Adult)   Pulse 64   Temp 97.5 °F (36.4 °C)   Ht 162.6 cm (64\") Comment: patient recorded  Wt 74.8 kg (165 lb)   SpO2 99%   BMI 28.32 kg/m²      Physical Exam:  Physical Exam  Vitals and nursing note reviewed.   HENT:      Head: Normocephalic.      Right Ear: Tenderness present. A middle ear effusion is present. Tympanic membrane is injected and erythematous.      Left Ear: Tenderness present. A middle ear effusion is present. Tympanic membrane is injected and erythematous.      Nose: Mucosal edema and congestion present.      Right Turbinates: Swollen.      Left Turbinates: Swollen.      Mouth/Throat:      Lips: Pink.      Pharynx: Pharyngeal swelling, oropharyngeal exudate, posterior oropharyngeal erythema and uvula swelling present.   Eyes:      General: Lids are normal.      Conjunctiva/sclera: Conjunctivae normal.      Pupils: Pupils are equal, round, and reactive to light.   Cardiovascular:      Rate and Rhythm: Normal rate.      Heart sounds: Normal heart sounds.   Pulmonary:      Effort: Pulmonary effort is normal.      Breath sounds: Normal breath sounds.   Abdominal:      General: Bowel sounds are normal.   Musculoskeletal:         General: Normal range of motion.   Lymphadenopathy:      Cervical: Cervical adenopathy present.      Right cervical: Superficial cervical adenopathy present.      Left cervical: Superficial cervical adenopathy present.   Skin:     General: Skin is warm and dry.   Neurological:      Mental  Status: She is alert and oriented to person, place, and time.      Gait: Gait is intact.   Psychiatric:         Attention and Perception: Attention normal.         Mood and Affect: Mood and affect normal.         Speech: Speech normal.         Behavior: Behavior normal. Behavior is cooperative.         Assessment / Plan     Assessment/Plan:   Diagnoses and all orders for this visit:    1. Acute non-recurrent pansinusitis (Primary)  -     amoxicillin (AMOXIL) 500 MG capsule; Take 2 capsules by mouth 2 (Two) Times a Day for 5 days.  Dispense: 20 capsule; Refill: 0    2. Non-recurrent acute suppurative otitis media of both ears without spontaneous rupture of tympanic membranes  -     amoxicillin (AMOXIL) 500 MG capsule; Take 2 capsules by mouth 2 (Two) Times a Day for 5 days.  Dispense: 20 capsule; Refill: 0        Discussion Summary:  Discussed plan of care in detail with pt today; pt verb understanding and agrees.    Follow up:  Return in about 3 months (around 3/29/2023) for Annual.     Patient Education:  There are no Patient Instructions on file for this visit.    AMBIKA Solomon  01/03/23  16:41 EST    Please note that portions of this note may have been completed with a voice recognition program.

## 2022-12-30 ENCOUNTER — PATIENT ROUNDING (BHMG ONLY) (OUTPATIENT)
Dept: FAMILY MEDICINE CLINIC | Facility: CLINIC | Age: 67
End: 2022-12-30

## 2023-03-01 ENCOUNTER — OFFICE VISIT (OUTPATIENT)
Dept: GYNECOLOGIC ONCOLOGY | Facility: CLINIC | Age: 68
End: 2023-03-01
Payer: MEDICARE

## 2023-03-01 ENCOUNTER — TELEPHONE (OUTPATIENT)
Dept: GYNECOLOGIC ONCOLOGY | Facility: CLINIC | Age: 68
End: 2023-03-01

## 2023-03-01 VITALS
HEART RATE: 68 BPM | WEIGHT: 164 LBS | RESPIRATION RATE: 18 BRPM | TEMPERATURE: 97.3 F | DIASTOLIC BLOOD PRESSURE: 79 MMHG | BODY MASS INDEX: 28 KG/M2 | HEIGHT: 64 IN | OXYGEN SATURATION: 98 % | SYSTOLIC BLOOD PRESSURE: 163 MMHG

## 2023-03-01 DIAGNOSIS — N83.201 RIGHT OVARIAN CYST: Primary | ICD-10-CM

## 2023-03-01 DIAGNOSIS — I49.9 CARDIAC ARRHYTHMIA, UNSPECIFIED CARDIAC ARRHYTHMIA TYPE: ICD-10-CM

## 2023-03-01 DIAGNOSIS — M06.9 RHEUMATOID ARTHRITIS, INVOLVING UNSPECIFIED SITE, UNSPECIFIED WHETHER RHEUMATOID FACTOR PRESENT: ICD-10-CM

## 2023-03-01 PROCEDURE — 99204 OFFICE O/P NEW MOD 45 MIN: CPT | Performed by: OBSTETRICS & GYNECOLOGY

## 2023-03-01 RX ORDER — PROGESTERONE 100 MG/1
100 CAPSULE ORAL DAILY
Qty: 30 CAPSULE | Refills: 0
Start: 2023-03-01

## 2023-03-01 NOTE — TELEPHONE ENCOUNTER
LVM for patient. I have her scheduled for 4/5/23 with Dr Efrain Burt at 945. Clearance form faxed.

## 2023-03-01 NOTE — PROGRESS NOTES
Tiffany Martinez  [id number]  1955      Reason for visit:  Ovarian cyst    Consultation:  Patient is being seen at the request of Dr. Ileana Deutsch.    History of present illness:  The patient is a 67 y.o. year old female who presents today for treatment and evaluation of the above issues.    Tiffany was evaluated by Dr. Deutsch for pelvic pain and found to have a 7cm right adnexal structure on transvaginal ultrasound on 23. Subsequent MRI on 23 demonstrated 7cm structure on right adnexa separate from right ovary consistent with possible hydrosalpinx. She had know about the structure for many years which was followed by Dr. Cruz, but recent imaging shows and increase in size as CT AP in 2022 noted 4cm structure vs 7cm on recent ultrasound.  level 10 on recent evaluation. Denies night sweats, unexplained weight loss, decreased appetite, postmenopausal bleeding. Has chronic constipation, stable to self. Reports normal bladder function.    For new patients, [facility] intake form from 3/1/23 was reviewed and confirmed.    OBGYN History:  She is a .  She does use HRT, reports using estradiol + progresterone x 20 years. She does report having history of abnormal pap smears. She did not receive any treatment for these, but close follow-up and the lesions always resolved.    Oncologic History:  Oncology/Hematology History    No history exists.         Past Medical History:   Diagnosis Date   • Acid reflux    • Arrhythmia    • Colon polyp 2015   • Diverticulosis 2015   • Hemorrhoids, internal 2015   • Hyperlipidemia    • Hypertension    • Rheumatoid arthritis (HCC)    • Shingles 2020    Left abd/flank/back       Past Surgical History:   Procedure Laterality Date   •  SECTION      times 2   • COLONOSCOPY W/ POLYPECTOMY  2021   • ENDOSCOPY  2021    [facility]    • HYSTEROSCOPY ENDOMETRIAL ABLATION  2016    D&C polypectomy [facility]   • TUBAL ABDOMINAL LIGATION Bilateral   "      MEDICATIONS: The current medication list was reviewed with the patient and updated in the EMR this date per the Medical Assistant. Medication dosages and frequencies were confirmed to be accurate.      Allergies:  is allergic to codeine.    Social History:   Social History     Socioeconomic History   • Marital status:    Tobacco Use   • Smoking status: Former     Packs/day: 0.50     Types: Cigarettes     Quit date: 12/15/2022     Years since quittin.2   • Smokeless tobacco: Never   • Tobacco comments:     going to quit   Vaping Use   • Vaping Use: Never used   Substance and Sexual Activity   • Alcohol use: Not Currently   • Drug use: No   • Sexual activity: Yes     Partners: Male       Family History:    Family History   Problem Relation Age of Onset   • Heart disease Mother    • Heart attack Father    • Stroke Father 79        on Coumadin   • No Known Problems Brother    • Breast cancer Neg Hx    • Ovarian cancer Neg Hx    • Colon cancer Neg Hx        Health Maintenance:    Health Maintenance   Topic Date Due   • HEPATITIS C SCREENING  Never done   • ANNUAL WELLNESS VISIT  Never done   • LIPID PANEL  Never done   • TDAP/TD VACCINES (2 - Td or Tdap) 2022   • PAP SMEAR  2023   • Pneumococcal Vaccine 65+ (1 - PCV) 2023 (Originally 2021)   • MAMMOGRAM  06/15/2023   • DXA SCAN  2024   • COLORECTAL CANCER SCREENING  2025   • COVID-19 Vaccine  Completed   • INFLUENZA VACCINE  Completed   • ZOSTER VACCINE  Completed       Review of Systems    Physical Exam    Vitals:    23 1419   BP: 163/79   Pulse: 68   Resp: 18   Temp: 97.3 °F (36.3 °C)   TempSrc: Temporal   SpO2: 98%   Weight: 74.4 kg (164 lb)   Height: 162.6 cm (64\")   PainSc: 0-No pain       Body mass index is 28.15 kg/m².  Wt Readings from Last 3 Encounters:   23 74.4 kg (164 lb)   22 74.8 kg (165 lb)   10/24/22 68.9 kg (152 lb)       GENERAL: Alert, well-appearing female appearing her stated age " who is in no apparent distress.   HEENT: Sclera anicteric. Head normocephalic, atraumatic. Mucus membranes moist.   NECK: Trachea midline, supple, without masses.  No thyromegaly.   BREASTS: Deferred  CARDIOVASCULAR: Normal rate, regular rhythm, no murmurs, rubs, or gallops.  No peripheral edema.  RESPIRATORY: Clear to auscultation bilaterally, normal respiratory effort  BACK:  No CVA tenderness, no vertebral tenderness on palpation  GASTROINTESTINAL:  Abdomen is soft, non-tender, non-distended, no rebound or guarding, no masses, or hernias. No HSM.  SKIN:  Warm, dry, well-perfused.  All visible areas intact.  No rashes, lesions, ulcers.  PSYCHIATRIC: AO x3, with appropriate affect, normal thought processes.  NEUROLOGIC: No focal deficits.  Moves extremities well.  MUSCULOSKELETAL: Normal gait and station.   EXTREMITIES:   No cyanosis, clubbing, symmetric.  LYMPHATICS:  No cervical or inguinal adenopathy noted.     PELVIC exam:    External genitalia are free from lesion. On speculum examination, the cervix was free from lesion. On bimanual examination no mass was appreciated.  Uterus was normal in size and shape. There is no cervical motion or uterine tenderness. No cervical mass was palpated. Parametria were smooth. Rectovaginal exam was deferred.     ECOG PS 0    PROCEDURES:  None    Diagnostic Data:      Transvaginal ultrasound from 1/5/23 reviewed in media tab: 7cm uterus with 2cm submucosal fibroid, 4mm endometrium, R adnexa [id number] simple structure, L ovary w/ 1cm simple cyst, no free fluid    MRI pelvis 2/1/23 reviewed in media tab: cystic mass in R adnexa separate from R ovary appears to be hydrosalpinx [id number], heterogenous myometrium consistent with adenomyosis, subcentimeter small cyst L ovary, normal vagina and cervix     CA-125: 10 on 1/5/23    Lab Results   Component Value Date    WBC 9.95 10/08/2022    HGB 13.9 10/08/2022    HCT 40.5 10/08/2022    MCV 93.3 10/08/2022     10/08/2022    NEUTROABS  8.54 (H) 10/08/2022    GLUCOSE 115 (H) 10/08/2022    BUN 18 10/08/2022    CREATININE 0.93 10/08/2022     10/08/2022    K 3.9 10/08/2022     10/08/2022    CO2 22.1 10/08/2022    CALCIUM 9.6 10/08/2022    ALBUMIN 4.20 10/08/2022    AST 20 10/08/2022    ALT 11 10/08/2022    BILITOT 0.4 10/08/2022           Assessment & Plan   This is a 67 y.o. woman with right ovarian cyst.  Encounter Diagnoses   Name Primary?   • Right ovarian cyst Yes   • Rheumatoid arthritis, involving unspecified site, unspecified whether rheumatoid factor present (HCC)    • Cardiac arrhythmia, unspecified cardiac arrhythmia type      Chronic right ovarian cyst concerning for recent increase in size based on serial imaging. MRI pelvis images reviewed today and on Dr. Cowan's interpretation: agree with read showing 6cm simple appearing right ovarian cyst and small simple left ovarian cyst. Discussed normal  of 10, but based on recent increase in size of cyst, patient is interested in surgical management for diagnosis and treatment purposes. No family history of gynecologic, prostate, colon, or pancreatic cancers.     Patient was consented for laparoscopic bilateral salpingo-oophorectomy.  Risks and benefits of surgery were discussed.  This included, but was not limited to, infection and bleeding like when the skin is cut; damage to surrounding structures; and incisional complications.  Risk of DVT was addressed for major surgeries.  Standard of care efforts to minimize these risks were reviewed.  Typical hospital stay and recovery were discussed as well as post-procedure precautions.  Surgical implications of chronic illnesses on recovery and surgical outcome were reviewed. Pain medication regimen for postoperative care was discussed.  Typical regimen and avoidance of narcotics was discussed.  Patient was educated that other factors, such as existing narcotic use, can impact postoperative pain management.    Discussed  possibility of hysterectomy to simplify postmenopausal hormone replacement management.  Patient currently on estrogen and progesterone.  She and I agreed that more extensive surgery is not what she would prefer at this point.    Patient verbalized understanding of the plan including the risks and benefits.  Appropriate perioperative testing including laboratory evaluation, EKG as clinically indicated, chest x-ray as clinically indicated, and preadmission evaluation were all ordered as a part of this patient's care. She will get clearance from her cardiologist, Dr. Efrain Burt, prior to surgery.    Co-morbidities include  Rheumatoid arthritis  Cardiac arrhythmia     Pain assessment was performed today as a part of patient’s care.  For patients with pain related to surgery, gynecologic malignancy or cancer treatment, the plan is as noted in the assessment/plan.  For patients with pain not related to these issues, they are to seek any further needed care from a more appropriate provider, such as PCP.      Orders Placed This Encounter   Procedures   • SCANNED - IMAGING   • SCANNED - IMAGING   • SCANNED - LABS       FOLLOW UP:  Surgery    I spent 45 minutes caring for Tiffany on this date of service. This time includes time spent by me in the following activities: preparing for the visit, reviewing tests, performing a medically appropriate examination and/or evaluation, counseling and educating the patient/family/caregiver, ordering medications, tests, or procedures, referring and communicating with other health care professionals, documenting information in the medical record and independently interpreting results and communicating that information with the patient/family/caregiver      Patient was seen and examined with Dr. Conn,  resident, who performed portions of the examination and documentation for this patient's care under my direct supervision.  I agree with the above documentation and plan.    Jocelyn SHER  MD hCapo  03/01/23  17:35 EST

## 2023-03-02 ENCOUNTER — TELEPHONE (OUTPATIENT)
Dept: GYNECOLOGIC ONCOLOGY | Facility: CLINIC | Age: 68
End: 2023-03-02
Payer: MEDICARE

## 2023-03-03 ENCOUNTER — PATIENT ROUNDING (BHMG ONLY) (OUTPATIENT)
Dept: GYNECOLOGIC ONCOLOGY | Facility: CLINIC | Age: 68
End: 2023-03-03
Payer: MEDICARE

## 2023-03-03 NOTE — PROGRESS NOTES
A My-Chart message has been sent to the patient for PATIENT ROUNDING with [facility].

## 2023-03-07 DIAGNOSIS — N83.201 RIGHT OVARIAN CYST: Primary | ICD-10-CM

## 2023-03-17 ENCOUNTER — TELEPHONE (OUTPATIENT)
Dept: GYNECOLOGIC ONCOLOGY | Facility: CLINIC | Age: 68
End: 2023-03-17
Payer: MEDICARE

## 2023-03-21 ENCOUNTER — OUTSIDE FACILITY SERVICE (OUTPATIENT)
Dept: GYNECOLOGIC ONCOLOGY | Facility: CLINIC | Age: 68
End: 2023-03-21
Payer: MEDICARE

## 2023-03-21 ENCOUNTER — TELEPHONE (OUTPATIENT)
Dept: GYNECOLOGIC ONCOLOGY | Facility: CLINIC | Age: 68
End: 2023-03-21

## 2023-03-21 ENCOUNTER — LAB REQUISITION (OUTPATIENT)
Dept: LAB | Facility: HOSPITAL | Age: 68
End: 2023-03-21
Payer: MEDICARE

## 2023-03-21 DIAGNOSIS — N83.00 FOLLICULAR CYST OF OVARY, UNSPECIFIED SIDE: ICD-10-CM

## 2023-03-21 PROCEDURE — 88305 TISSUE EXAM BY PATHOLOGIST: CPT

## 2023-03-21 RX ORDER — ONDANSETRON 4 MG/1
4 TABLET, FILM COATED ORAL EVERY 6 HOURS PRN
Qty: 10 TABLET | Refills: 0 | Status: SHIPPED | OUTPATIENT
Start: 2023-03-21

## 2023-03-21 RX ORDER — TRAMADOL HYDROCHLORIDE 50 MG/1
50 TABLET ORAL EVERY 6 HOURS PRN
Qty: 5 TABLET | Refills: 0 | Status: SHIPPED | OUTPATIENT
Start: 2023-03-21

## 2023-03-21 RX ORDER — ACETAMINOPHEN 325 MG/1
650 TABLET ORAL EVERY 6 HOURS PRN
Qty: 60 TABLET | Refills: 1 | Status: SHIPPED | OUTPATIENT
Start: 2023-03-21

## 2023-03-21 RX ORDER — DOCUSATE SODIUM 250 MG
250 CAPSULE ORAL 2 TIMES DAILY
Qty: 60 CAPSULE | Refills: 1 | Status: SHIPPED | OUTPATIENT
Start: 2023-03-21

## 2023-03-21 NOTE — TELEPHONE ENCOUNTER
"Pt called after returning home from surgery (BSO, [facility]) this am. Pt reports having some bleeding from two of her three sites. One site, over the umbilicus, \"appears open- like a cut.\" RN told patient to place gauze over wound lightly and watch for more bleeding. RN to discuss with Provider and call pt back with more instructions.   "

## 2023-03-22 ENCOUNTER — OFFICE VISIT (OUTPATIENT)
Dept: GYNECOLOGIC ONCOLOGY | Facility: CLINIC | Age: 68
End: 2023-03-22
Payer: MEDICARE

## 2023-03-22 VITALS
BODY MASS INDEX: 28.97 KG/M2 | HEART RATE: 58 BPM | RESPIRATION RATE: 18 BRPM | WEIGHT: 169.7 LBS | SYSTOLIC BLOOD PRESSURE: 163 MMHG | TEMPERATURE: 97.1 F | HEIGHT: 64 IN | DIASTOLIC BLOOD PRESSURE: 79 MMHG | OXYGEN SATURATION: 100 %

## 2023-03-22 DIAGNOSIS — Z98.890 POST-OPERATIVE STATE: Primary | ICD-10-CM

## 2023-03-22 LAB — REF LAB TEST METHOD: NORMAL

## 2023-03-22 PROCEDURE — 1159F MED LIST DOCD IN RCRD: CPT | Performed by: OBSTETRICS & GYNECOLOGY

## 2023-03-22 PROCEDURE — 3078F DIAST BP <80 MM HG: CPT | Performed by: OBSTETRICS & GYNECOLOGY

## 2023-03-22 PROCEDURE — 3077F SYST BP >= 140 MM HG: CPT | Performed by: OBSTETRICS & GYNECOLOGY

## 2023-03-22 PROCEDURE — 1126F AMNT PAIN NOTED NONE PRSNT: CPT | Performed by: OBSTETRICS & GYNECOLOGY

## 2023-03-22 PROCEDURE — 1160F RVW MEDS BY RX/DR IN RCRD: CPT | Performed by: OBSTETRICS & GYNECOLOGY

## 2023-03-22 PROCEDURE — 99024 POSTOP FOLLOW-UP VISIT: CPT | Performed by: OBSTETRICS & GYNECOLOGY

## 2023-03-22 NOTE — PROGRESS NOTES
"Tiffany Martinez  [id number]  1955      Reason for Visit:  Postoperative evaluation    History of Present Illness:  Patient is a very pleasant 67 y.o. woman who presents for a post operative evaluation status post laparoscopic bilateral salpingo-oophorectomy performed on 3/21/23.      Surgery and hospital course were uncomplicated.  Today, patient notes normal bowel and bladder function.  Her pain is well controlled. She has questions about resuming normal activities.     Past Medical History, Past Surgical History, Social History, Family History have been reviewed and are without significant changes except as mentioned.    Review of Systems   All other systems were reviewed and are negative except as mentioned above.    Medications:  The current medication list was reviewed in the EMR    ALLERGIES:    Allergies   Allergen Reactions   • Codeine Nausea And Vomiting and Hives         /79   Pulse 58   Temp 97.1 °F (36.2 °C) (Temporal)   Resp 18   Ht 162.6 cm (64\")   Wt 77 kg (169 lb 11.2 oz)   SpO2 100%   BMI 29.13 kg/m²   ECOG score: 2       Physical Exam  Constitutional:  Patient is a pleasant woman in no acute distress.  Gastrointestinal: Abdomen is soft and appropriately tender.  There is no mass palpated.  There is no rebound or guarding.  Incision at umbilicus is remarkable for superficial separation.  Wound was irrigated and packed with quarter inch Nu Gauze.  Ecchymosis at left lower quadrant incision, otherwise intact.  Tape over right lower quadrant incision  Extremities:  Bilateral lower extremities are non-tender.  Gynecologic: Deferred    PATHOLOGY:  Pending    ASSESSMENT/PLAN:  Tiffany Martinez returns for a post-operative evaluation today.   was taught wound care.      Overall, the patient is very pleased with her care.  I recommended continuation of post operative precautions as discussed.     She is to follow-up in 1 week.    Jocelyn Cowan MD  03/22/23  15:58 EDT      "

## 2023-03-23 ENCOUNTER — TELEPHONE (OUTPATIENT)
Dept: GYNECOLOGIC ONCOLOGY | Facility: CLINIC | Age: 68
End: 2023-03-23
Payer: MEDICARE

## 2023-03-23 NOTE — TELEPHONE ENCOUNTER
Patient notified of Providers comments on Pathology results with voiced understanding and thank you.

## 2023-03-23 NOTE — TELEPHONE ENCOUNTER
----- Message from Jocelyn Cowan MD sent at 3/23/2023  3:33 PM EDT -----  Please let patient know the final pathology was benign.  Thank you      ----- Message -----  From: Lab, Background User  Sent: 3/22/2023   5:23 PM EDT  To: Jocelyn Cowan MD

## 2023-03-29 ENCOUNTER — OFFICE VISIT (OUTPATIENT)
Dept: GYNECOLOGIC ONCOLOGY | Facility: CLINIC | Age: 68
End: 2023-03-29
Payer: MEDICARE

## 2023-03-29 VITALS
RESPIRATION RATE: 18 BRPM | DIASTOLIC BLOOD PRESSURE: 72 MMHG | SYSTOLIC BLOOD PRESSURE: 134 MMHG | HEIGHT: 64 IN | BODY MASS INDEX: 28.27 KG/M2 | OXYGEN SATURATION: 97 % | WEIGHT: 165.6 LBS | HEART RATE: 72 BPM | TEMPERATURE: 97.1 F

## 2023-03-29 DIAGNOSIS — Z98.890 POST-OPERATIVE STATE: Primary | ICD-10-CM

## 2023-03-29 PROCEDURE — 1159F MED LIST DOCD IN RCRD: CPT | Performed by: OBSTETRICS & GYNECOLOGY

## 2023-03-29 PROCEDURE — 99024 POSTOP FOLLOW-UP VISIT: CPT | Performed by: OBSTETRICS & GYNECOLOGY

## 2023-03-29 PROCEDURE — 3075F SYST BP GE 130 - 139MM HG: CPT | Performed by: OBSTETRICS & GYNECOLOGY

## 2023-03-29 PROCEDURE — 1125F AMNT PAIN NOTED PAIN PRSNT: CPT | Performed by: OBSTETRICS & GYNECOLOGY

## 2023-03-29 PROCEDURE — 1160F RVW MEDS BY RX/DR IN RCRD: CPT | Performed by: OBSTETRICS & GYNECOLOGY

## 2023-03-29 PROCEDURE — 3078F DIAST BP <80 MM HG: CPT | Performed by: OBSTETRICS & GYNECOLOGY

## 2023-03-29 NOTE — PROGRESS NOTES
"Tiffany Martinez  [id number]  1955    Reason for Visit:  Postoperative evaluation    History of Present Illness:  Patient is a very pleasant 67 y.o. woman who presents for a post operative evaluation status post laparoscopic bilateral salpingo-oophorectomy performed on 3/21/23.  Laparoscopic incision dehiscence was noted at the samantha-umbilical port site incision following surgery, and the patient is following up to assess wound and appropriate healing today.     The patient notes normal bowel and bladder function.  Her pain is well controlled. She has questions about resuming normal activities.     Past Medical History, Past Surgical History, Social History, Family History have been reviewed and are without significant changes except as mentioned.    Review of Systems   All other systems were reviewed and are negative except as mentioned above.    Medications:  The current medication list was reviewed in the EMR    ALLERGIES:    Allergies   Allergen Reactions   • Codeine Nausea And Vomiting and Hives         /72   Pulse 72   Temp 97.1 °F (36.2 °C) (Temporal)   Resp 18   Ht 162.6 cm (64\")   Wt 75.1 kg (165 lb 9.6 oz)   SpO2 97%   BMI 28.43 kg/m²   ECOG score: 0       Physical Exam  Constitutional:  Patient is a pleasant woman in no acute distress.  Gastrointestinal: Abdomen is soft and appropriately tender.  There is no mass palpated.  There is no rebound or guarding.  Incision at umbilicus is remarkable for superficial separation.  Wound was irrigated and packed with quarter inch Nu Gauze.  Incision approximately 1 cm in depth, more superficial than previous visit.  No erythema or drainage suggestive of infection.  Bilateral lower quadrant trocar site incisions are clean dry and intact.  Extremities:  Bilateral lower extremities are non-tender.  Gynecologic: Deferred    PATHOLOGY:  DIAGNOSIS:   OVARY AND FALLOPIAN TUBE, BILATERAL:   Ovaries with abundant follicular cysts and simple cysts.   Two " unremarkable fallopian tubes.   Negative for malignancy.     ASSESSMENT/PLAN:  Tiffany Martinez returns for a post-operative evaluation today.  Wound care was addressed. The wound packing was removed, and new wet-to-dry nu-gauze strip was gently placed inside using a q-tip probe. Depth of periumbilical port site wound was noted today to be approx. 1 cm, decreased from last week's visit. The amount of packing able to be placed within the wound was also noted to be less than last week, indicating appropriate wound healing.      Overall, the patient is very pleased with her care.  I recommended continuation of post operative precautions as discussed.     She is to follow-up in 2 weeks.    Ajit Jimenez MD   Resident Physician, PGY 3  Gynecologic Oncology     I saw and evaluated the patient. I agree with the findings and the plan of care as documented in the note.    Jocelyn Cowan MD  03/30/23  13:35 EDT

## 2023-04-13 NOTE — PROGRESS NOTES
Tiffany Martinez  [id number]  1955    Reason for Visit:  Postoperative evaluation    History of Present Illness:  Patient is a very pleasant 67 y.o. woman who presents for a post operative evaluation status post laparoscopic bilateral salpingo-oophorectomy performed on 3/21/23.  Skin separation was noted at the samantha-umbilical port site incision following surgery, and the patient is following up to assess wound and appropriate healing today.     The patient notes normal bowel and bladder function.  Her pain is well controlled. She has questions about resuming normal activities.     Past Medical History, Past Surgical History, Social History, Family History have been reviewed and are without significant changes except as mentioned.    Review of Systems   All other systems were reviewed and are negative except as mentioned above.    Medications:  The current medication list was reviewed in the EMR    ALLERGIES:    Allergies   Allergen Reactions   • Codeine Nausea And Vomiting and Hives         /84   Pulse 62   Temp 97.1 °F (36.2 °C)   Wt 75.6 kg (166 lb 11.2 oz)   SpO2 97%   BMI 28.61 kg/m²   ECOG score: 2       Physical Exam  Constitutional:  Patient is a pleasant woman in no acute distress.  Gastrointestinal: Abdomen is soft and appropriately tender.  There is no mass palpated.  There is no rebound or guarding.  Incision at umbilicus was completely healed.  Dressing removed.  Encourage no further wound care.  Bilateral lower quadrant trocar site incisions are clean dry and intact.  Extremities:  Bilateral lower extremities are non-tender.  Gynecologic: Deferred    PATHOLOGY:  DIAGNOSIS:   OVARY AND FALLOPIAN TUBE, BILATERAL:   Ovaries with abundant follicular cysts and simple cysts.   Two unremarkable fallopian tubes.   Negative for malignancy.     ASSESSMENT/PLAN:  Tiffany Martinez returns for a post-operative evaluation today.        Overall, the patient is very pleased with her care.  I recommended  continuation of post operative precautions as discussed.     She is to follow-up on an as-needed basis    Jocelyn Cowan MD  04/15/23  13:35 EDT

## 2023-04-14 ENCOUNTER — OFFICE VISIT (OUTPATIENT)
Dept: GYNECOLOGIC ONCOLOGY | Facility: CLINIC | Age: 68
End: 2023-04-14
Payer: MEDICARE

## 2023-04-14 VITALS
DIASTOLIC BLOOD PRESSURE: 84 MMHG | OXYGEN SATURATION: 97 % | TEMPERATURE: 97.1 F | HEART RATE: 62 BPM | WEIGHT: 166.7 LBS | BODY MASS INDEX: 28.61 KG/M2 | SYSTOLIC BLOOD PRESSURE: 144 MMHG

## 2023-04-14 DIAGNOSIS — Z98.890 POST-OPERATIVE STATE: Primary | ICD-10-CM

## 2023-04-14 PROCEDURE — 3079F DIAST BP 80-89 MM HG: CPT | Performed by: OBSTETRICS & GYNECOLOGY

## 2023-04-14 PROCEDURE — 1159F MED LIST DOCD IN RCRD: CPT | Performed by: OBSTETRICS & GYNECOLOGY

## 2023-04-14 PROCEDURE — 1125F AMNT PAIN NOTED PAIN PRSNT: CPT | Performed by: OBSTETRICS & GYNECOLOGY

## 2023-04-14 PROCEDURE — 99024 POSTOP FOLLOW-UP VISIT: CPT | Performed by: OBSTETRICS & GYNECOLOGY

## 2023-04-14 PROCEDURE — 1160F RVW MEDS BY RX/DR IN RCRD: CPT | Performed by: OBSTETRICS & GYNECOLOGY

## 2023-04-14 PROCEDURE — 3077F SYST BP >= 140 MM HG: CPT | Performed by: OBSTETRICS & GYNECOLOGY

## 2023-04-14 RX ORDER — PANTOPRAZOLE SODIUM 40 MG/1
1 TABLET, DELAYED RELEASE ORAL EVERY 24 HOURS
COMMUNITY

## 2023-04-14 NOTE — LETTER
Tiffany Martinez  [id number]  1955    Reason for Visit:  Postoperative evaluation    History of Present Illness:  Patient is a very pleasant 67 y.o. woman who presents for a post operative evaluation status post laparoscopic bilateral salpingo-oophorectomy performed on 3/21/23.  Skin separation was noted at the samantha-umbilical port site incision following surgery, and the patient is following up to assess wound and appropriate healing today.     The patient notes normal bowel and bladder function.  Her pain is well controlled. She has questions about resuming normal activities.     Past Medical History, Past Surgical History, Social History, Family History have been reviewed and are without significant changes except as mentioned.    Review of Systems   All other systems were reviewed and are negative except as mentioned above.    Medications:  The current medication list was reviewed in the EMR    ALLERGIES:    Allergies   Allergen Reactions   • Codeine Nausea And Vomiting and Hives         /84   Pulse 62   Temp 97.1 °F (36.2 °C)   Wt 75.6 kg (166 lb 11.2 oz)   SpO2 97%   BMI 28.61 kg/m²   ECOG score: 2       Physical Exam  Constitutional:  Patient is a pleasant woman in no acute distress.  Gastrointestinal: Abdomen is soft and appropriately tender.  There is no mass palpated.  There is no rebound or guarding.  Incision at umbilicus was completely healed.  Dressing removed.  Encourage no further wound care.  Bilateral lower quadrant trocar site incisions are clean dry and intact.  Extremities:  Bilateral lower extremities are non-tender.  Gynecologic: Deferred    PATHOLOGY:  DIAGNOSIS:   OVARY AND FALLOPIAN TUBE, BILATERAL:   Ovaries with abundant follicular cysts and simple cysts.   Two unremarkable fallopian tubes.   Negative for malignancy.     ASSESSMENT/PLAN:  Tiffany Martinez returns for a post-operative evaluation today.        Overall, the patient is very pleased with her care.  I recommended  continuation of post operative precautions as discussed.     She is to follow-up on an as-needed basis    Jocelyn Cowan MD  04/15/23  13:35 EDT

## 2023-05-08 ENCOUNTER — TELEPHONE (OUTPATIENT)
Dept: FAMILY MEDICINE CLINIC | Facility: CLINIC | Age: 68
End: 2023-05-08

## 2023-05-08 ENCOUNTER — HOSPITAL ENCOUNTER (OUTPATIENT)
Dept: ULTRASOUND IMAGING | Facility: HOSPITAL | Age: 68
Discharge: HOME | End: 2023-05-08
Admitting: FAMILY MEDICINE
Payer: MEDICARE

## 2023-05-08 ENCOUNTER — OFFICE VISIT (OUTPATIENT)
Dept: FAMILY MEDICINE CLINIC | Facility: CLINIC | Age: 68
End: 2023-05-08
Payer: MEDICARE

## 2023-05-08 VITALS
HEART RATE: 58 BPM | TEMPERATURE: 97.8 F | HEIGHT: 64 IN | OXYGEN SATURATION: 99 % | BODY MASS INDEX: 28.51 KG/M2 | DIASTOLIC BLOOD PRESSURE: 70 MMHG | WEIGHT: 167 LBS | SYSTOLIC BLOOD PRESSURE: 110 MMHG

## 2023-05-08 DIAGNOSIS — I82.4Y2 ACUTE DEEP VEIN THROMBOSIS (DVT) OF PROXIMAL VEIN OF LEFT LOWER EXTREMITY: ICD-10-CM

## 2023-05-08 DIAGNOSIS — M79.605 PAIN AND SWELLING OF LEFT LOWER EXTREMITY: ICD-10-CM

## 2023-05-08 DIAGNOSIS — M79.89 PAIN AND SWELLING OF LEFT LOWER EXTREMITY: Primary | ICD-10-CM

## 2023-05-08 DIAGNOSIS — M79.605 PAIN AND SWELLING OF LEFT LOWER EXTREMITY: Primary | ICD-10-CM

## 2023-05-08 DIAGNOSIS — I82.4Y2 ACUTE DEEP VEIN THROMBOSIS (DVT) OF PROXIMAL VEIN OF LEFT LOWER EXTREMITY: Primary | ICD-10-CM

## 2023-05-08 DIAGNOSIS — M79.89 PAIN AND SWELLING OF LEFT LOWER EXTREMITY: ICD-10-CM

## 2023-05-08 PROCEDURE — 1159F MED LIST DOCD IN RCRD: CPT | Performed by: FAMILY MEDICINE

## 2023-05-08 PROCEDURE — 3074F SYST BP LT 130 MM HG: CPT | Performed by: FAMILY MEDICINE

## 2023-05-08 PROCEDURE — 93971 EXTREMITY STUDY: CPT

## 2023-05-08 PROCEDURE — 1160F RVW MEDS BY RX/DR IN RCRD: CPT | Performed by: FAMILY MEDICINE

## 2023-05-08 PROCEDURE — 99214 OFFICE O/P EST MOD 30 MIN: CPT | Performed by: FAMILY MEDICINE

## 2023-05-08 PROCEDURE — 3078F DIAST BP <80 MM HG: CPT | Performed by: FAMILY MEDICINE

## 2023-05-08 RX ORDER — ALENDRONATE SODIUM 70 MG/1
TABLET ORAL
COMMUNITY
Start: 2023-04-13

## 2023-05-08 RX ORDER — MEDROXYPROGESTERONE ACETATE 150 MG/ML
1 INJECTION, SUSPENSION INTRAMUSCULAR
COMMUNITY
Start: 2023-04-13

## 2023-05-08 RX ORDER — PILOCARPINE HYDROCHLORIDE 5 MG/1
1 TABLET, FILM COATED ORAL EVERY 12 HOURS SCHEDULED
COMMUNITY
Start: 2023-04-13 | End: 2023-05-08 | Stop reason: SDUPTHER

## 2023-05-08 NOTE — PROGRESS NOTES
Subjective   Tiffany Martinez is a 67 y.o. female.     History of Present Illness  Here with c/o acute onset swelling left lower leg, left ankle over the weekend. No change in activity, on trauma, no travel. Has had h/o left knee surgery. Leg sometimes swells but not this severely. Also swelling not improved with rest, elevation of left foot, etc. Left lower leg now painful/tight.    Non-smoker. + HRT with E/P. No personal or family h/o DVT/PE.    Denies CP, SOA, palpitations.    The following portions of the patient's history were reviewed and updated as appropriate: allergies, current medications, past family history, past medical history, past social history, past surgical history and problem list.    Review of Systems   Constitutional: Positive for fatigue. Negative for fever and unexpected weight change.   HENT: Negative for nosebleeds.    Respiratory: Negative for cough, shortness of breath and wheezing.    Cardiovascular: Positive for leg swelling. Negative for chest pain and palpitations.   Gastrointestinal: Negative for blood in stool, diarrhea, nausea and vomiting.   Genitourinary: Negative for hematuria.   Musculoskeletal: Positive for arthralgias.   Skin: Negative for rash.   Neurological: Negative for headaches.   Hematological: Does not bruise/bleed easily.   Psychiatric/Behavioral: Negative for confusion.       Objective    Vitals:    05/08/23 1058   BP: 110/70   Pulse: 58   Temp: 97.8 °F (36.6 °C)   SpO2: 99%     Body mass index is 28.65 kg/m².      05/08/23  1058   Weight: 75.8 kg (167 lb)       Physical Exam  Vitals and nursing note reviewed.   Constitutional:       General: She is not in acute distress.     Appearance: She is well-developed, well-groomed and overweight. She is not ill-appearing.   Cardiovascular:      Rate and Rhythm: Normal rate and regular rhythm.      Pulses: Normal pulses.   Pulmonary:      Effort: Pulmonary effort is normal.      Breath sounds: Normal breath sounds.    Musculoskeletal:      Right lower leg: No edema.      Left lower leg: Tenderness (with increased warmth) present. 1+ Edema present.   Skin:     General: Skin is warm and dry.      Findings: No bruising.   Neurological:      Mental Status: She is alert and oriented to person, place, and time.      Sensory: Sensation is intact.      Motor: Motor function is intact.      Gait: Gait abnormal (mildly antaglic on left).   Psychiatric:         Behavior: Behavior normal. Behavior is cooperative.         Cognition and Memory: Cognition normal.       Lab Results   Component Value Date    WBC 9.95 10/08/2022    HGB 13.9 10/08/2022    HCT 40.5 10/08/2022    MCV 93.3 10/08/2022     10/08/2022     Lab Results   Component Value Date    GLUCOSE 115 (H) 10/08/2022    BUN 18 10/08/2022    CREATININE 0.93 10/08/2022    EGFR 67.9 10/08/2022    BCR 19.4 10/08/2022    K 3.9 10/08/2022    CO2 22.1 10/08/2022    CALCIUM 9.6 10/08/2022    ALBUMIN 4.20 10/08/2022    BILITOT 0.4 10/08/2022    AST 20 10/08/2022    ALT 11 10/08/2022         Assessment & Plan   Diagnoses and all orders for this visit:    1. Pain and swelling of left lower extremity (Primary)  -     US Venous Doppler Lower Extremity Left (duplex); Future    2. Acute deep vein thrombosis (DVT) of proximal vein of left lower extremity       Needs r/o DVT. She is amenable to venous doppler as above.  I will contact patient regarding test results and provide instructions regarding any necessary changes in plan of care.  Patient was encouraged to keep me informed of any acute changes, lack of improvement, or any new concerning symptoms.  Pt is aware of reasons to seek emergent care.  Patient voiced understanding of all instructions and denied further questions.    ADDENDUM:  Contacted by radiology. Venous doppler LLE + for prox DVT. I contacted pt. I have reviewed risks/benefits and potential side effects of various treatment options. Pt voices understanding and wishes to  proceed with Eliquis. Rx sent to pharmacy. Instructed to d/c HRT, mobic. She will be scheduled for 2 week f/u with PCP. Patient was encouraged to keep me informed of any acute changes, lack of improvement, or any new concerning symptoms.  Pt is aware of reasons to seek emergent care.  Patient voiced understanding of all instructions and denied further questions.    Please note that portions of this note may have been completed with a voice recognition program.

## 2023-05-08 NOTE — TELEPHONE ENCOUNTER
I spoke with pt directly. Informed her of results of + venous doppler and that prescription for Eliquis sent to pharmacy.     Make sure she is aware she should stop her hormone therapy as well as mobic if she is still taking it.    She needs to be scheduled for f/u with PCP in 2 weeks.

## 2023-05-09 ENCOUNTER — TELEPHONE (OUTPATIENT)
Dept: FAMILY MEDICINE CLINIC | Facility: CLINIC | Age: 68
End: 2023-05-09
Payer: MEDICARE

## 2023-05-09 DIAGNOSIS — I82.4Y2 ACUTE DEEP VEIN THROMBOSIS (DVT) OF PROXIMAL VEIN OF LEFT LOWER EXTREMITY: Primary | ICD-10-CM

## 2023-05-09 NOTE — TELEPHONE ENCOUNTER
PHONE CALL FROM PATIENT.  VANESSA IS TO EXPENSIVE.  WHAT ELSE CAN SHE TAKE?    PHARMACY:  [facility]    PLEASE CALL [phone]

## 2023-05-09 NOTE — TELEPHONE ENCOUNTER
Radiology called to report critical US result on  Pt.  Positive for left leg partial DVT superficial femoral through popliteal.  Gave message to Dr Aquino.

## 2023-05-10 NOTE — TELEPHONE ENCOUNTER
Caller: Tiffany Martinez    Relationship: Self    Best call back number: [phone]    What was the call regarding: FOLLOWING UP ON MESSAGE SHE LEFT YESTERDAY REGARDING ELOQUIS.   PLEASE ADVISE    Do you require a callback: YES

## 2023-05-11 ENCOUNTER — TELEPHONE (OUTPATIENT)
Dept: FAMILY MEDICINE CLINIC | Facility: CLINIC | Age: 68
End: 2023-05-11
Payer: MEDICARE

## 2023-05-11 NOTE — TELEPHONE ENCOUNTER
Patient called back about her Eliquis costing too much money. She says her insurance doesn't want to cover it. She would like to know what else she can take. She would like a call back today.

## 2023-05-12 ENCOUNTER — TELEPHONE (OUTPATIENT)
Dept: FAMILY MEDICINE CLINIC | Facility: CLINIC | Age: 68
End: 2023-05-12

## 2023-05-12 NOTE — TELEPHONE ENCOUNTER
Caller: Tiffany Martinez    Relationship: Self    Best call back number: [phone]     What was the call regarding: PATIENT CALLED STATING THAT THE MOST RECENT MEDICATION THAT WAS SENT WAS STILL TOO HIGH.  PATIENT ASKED TO TRY COUMADIN      Do you require a callback: YES   Medication:   Requested Prescriptions     Pending Prescriptions Disp Refills    simvastatin (ZOCOR) 20 MG tablet 90 tablet 2        Last Filled:      Patient Phone Number: [phone] (home)     Last appt: 10/18/2022   Next appt: 4/18/2023    Last OARRS: No flowsheet data found.

## 2023-05-15 NOTE — TELEPHONE ENCOUNTER
PT CALLED TO REQUEST ANOTHER RX FOR BLOOD CLOTS, STATED THAT IT IS GOING ON 2 WEEKS, PT NEEDS RX THAT IS A LOT CHEAPER THAN WHAT SHE HAS BEEN PRESCRIBED.    PLEASE ADVISE.  CALL BACK:[phone]

## 2023-05-17 ENCOUNTER — TELEPHONE (OUTPATIENT)
Dept: FAMILY MEDICINE CLINIC | Facility: CLINIC | Age: 68
End: 2023-05-17
Payer: MEDICARE

## 2023-05-17 NOTE — TELEPHONE ENCOUNTER
As this has not yet been addressed, please contact pt, check on status, make sure she was able to get her medication and make sure she has f/u with Carolynn as detailed below.

## 2023-05-17 NOTE — TELEPHONE ENCOUNTER
She is not taking meds,  I advised her that I had samples for her but she is going to see cardio tomorrow and is going to see what he says first.

## 2023-05-17 NOTE — TELEPHONE ENCOUNTER
Left vm to call me back.      Pt is going to see cardiologist tomorrow and is going to see what he says about taking xarelto.  I advised pt I had samples here for her if he is ok with her taking.

## 2023-05-18 ENCOUNTER — TELEPHONE (OUTPATIENT)
Dept: FAMILY MEDICINE CLINIC | Facility: CLINIC | Age: 68
End: 2023-05-18
Payer: MEDICARE

## 2023-05-18 NOTE — TELEPHONE ENCOUNTER
Spoke with Dr Efrain Burt, Cardiologist office at [facility] and they sts she did come and see him today.  He   Rx xarelto 20 mg and she is to FU in 6 bwks with him.

## 2023-05-18 NOTE — TELEPHONE ENCOUNTER
Last office note forwarded to her cardiologist yesterday along with message regarding treatment status.    Please contact that office and confirm that she was seen.

## 2023-05-19 ENCOUNTER — TELEPHONE (OUTPATIENT)
Dept: UROLOGY | Facility: CLINIC | Age: 68
End: 2023-05-19

## 2023-05-19 NOTE — TELEPHONE ENCOUNTER
Hub staff attempted to follow warm transfer process and was unsuccessful     Caller: Tiffany Martinez    Relationship to patient: Self    Best call back number: [phone]    Patient is needing:     RECEIVED A CALL FROM PT. C/O BLOOD IN URINE. SX STARTED THIS MORNING.STARTED A NEW MEDICATION YESTERDAY rivaroxaban (XARELTO) 15 MG tablet. PT CALLED DR.BRUCE GUADARRAMA OFFICE(CARDIOLOGIST) AND PCP DR. NASH OFFICE TO RULE OUT IF THE BLOOD IN THE URINE HAS ANYTHING TO DO WITH THE NEW MEDICATION SHE STARTED YESTERDAY.     PER PT PROVIDERS STATED THE MEDICATION SHOULD NOT CAUSE THESE SIDE EFFECTS OF BLOOD IN THE URINE.    PT MENTIONED THAT SHE HAD A PROCEDURE  WITH DR. FUNEZ (GYNO- ONCOLOGIST) AND SHE DOES NOT KNOW IF THIS HAS SOMETHING TO DO WITH THE BLEEDING IN THE URINE BECAUSE THE PROCEDURE WAS DONE IN MARCH. THIS IS SOMETHING NEW. SHE IS TRYING TO RULE OUT WHAT COULD BE CAUSING THE BLEEDING.     ON 4/14/23 OFFICE VISIT WITH DR. FUNEZ PER OFFICE NOTES status post laparoscopic bilateral salpingo-oophorectomy performed on 3/21/23.     [facility] IN CHART IS THE CORRECT PHARMACY.  OFFICE PLEASE ADVISE.THANK YOU.

## 2023-05-19 NOTE — TELEPHONE ENCOUNTER
Call patient and let her know this is a new problem for her I have only seen her once in the past and that was evaluating her for a kidney stone you can have blood in your urine because of a UTI, because of kidney stones and sometimes blood thinners can cause blood in urine.  What she needs to do is call and schedule an appointment so we can evaluate her urine and do proper work-up.  She voiced understanding

## 2023-05-22 ENCOUNTER — OFFICE VISIT (OUTPATIENT)
Dept: UROLOGY | Facility: CLINIC | Age: 68
End: 2023-05-22
Payer: MEDICARE

## 2023-05-22 VITALS
HEIGHT: 64 IN | BODY MASS INDEX: 28.51 KG/M2 | SYSTOLIC BLOOD PRESSURE: 112 MMHG | DIASTOLIC BLOOD PRESSURE: 72 MMHG | TEMPERATURE: 97.4 F | WEIGHT: 167 LBS

## 2023-05-22 DIAGNOSIS — R31.0 GROSS HEMATURIA: Primary | ICD-10-CM

## 2023-05-22 LAB
BILIRUB BLD-MCNC: NEGATIVE MG/DL
CLARITY, POC: ABNORMAL
COLOR UR: ABNORMAL
EXPIRATION DATE: ABNORMAL
GLUCOSE UR STRIP-MCNC: NEGATIVE MG/DL
KETONES UR QL: ABNORMAL
LEUKOCYTE EST, POC: ABNORMAL
Lab: ABNORMAL
NITRITE UR-MCNC: NEGATIVE MG/ML
PH UR: 5 [PH] (ref 5–8)
PROT UR STRIP-MCNC: ABNORMAL MG/DL
RBC # UR STRIP: ABNORMAL /UL
SP GR UR: 1.03 (ref 1–1.03)
UROBILINOGEN UR QL: NORMAL

## 2023-05-22 PROCEDURE — 1159F MED LIST DOCD IN RCRD: CPT | Performed by: NURSE PRACTITIONER

## 2023-05-22 PROCEDURE — 99214 OFFICE O/P EST MOD 30 MIN: CPT | Performed by: NURSE PRACTITIONER

## 2023-05-22 PROCEDURE — 1160F RVW MEDS BY RX/DR IN RCRD: CPT | Performed by: NURSE PRACTITIONER

## 2023-05-22 PROCEDURE — 81003 URINALYSIS AUTO W/O SCOPE: CPT | Performed by: NURSE PRACTITIONER

## 2023-05-22 PROCEDURE — 3074F SYST BP LT 130 MM HG: CPT | Performed by: NURSE PRACTITIONER

## 2023-05-22 PROCEDURE — 3078F DIAST BP <80 MM HG: CPT | Performed by: NURSE PRACTITIONER

## 2023-05-22 RX ORDER — NITROFURANTOIN 25; 75 MG/1; MG/1
100 CAPSULE ORAL 2 TIMES DAILY
Qty: 14 CAPSULE | Refills: 0 | Status: SHIPPED | OUTPATIENT
Start: 2023-05-22

## 2023-05-22 RX ORDER — OMEPRAZOLE 40 MG/1
CAPSULE, DELAYED RELEASE ORAL
COMMUNITY
Start: 2023-05-09

## 2023-05-22 NOTE — PROGRESS NOTES
Office Visit Hematuria Female      Patient Name: Tiffany Martinez  : 1955   MRN: [id number]     Chief Complaint: Gross hematuria.   Chief Complaint   Patient presents with   • Follow-up     UTI        Referring Provider: No ref. provider found    History of Present Illness: Tiffany Martinez is a 67 y.o. female who presents today for history of recent DVT who presents with gross hematuria.  Patient does have current hematuria. Today she started hurting with urinating   Started Xarelto on Wednesday or Thursday of last week then started seeing blood in urine     History of smoking?    yes  History of second-hand smoking exposure? yes  History of chemotherapy?   no  History of radiation?    no  History of kidney or bladder stones?  yes  History of frequent urinary tract infections? no    Subjective      Review of System:   As noted in HPI    Past Medical History:   Past Medical History:   Diagnosis Date   • Acid reflux    • Arrhythmia    • Colon polyp 2015   • Diverticulosis 2015   • Hemorrhoids, internal 2015   • Hyperlipidemia    • Hypertension    • Rheumatoid arthritis    • Shingles 2020    Left abd/flank/back       Past Surgical History:   Past Surgical History:   Procedure Laterality Date   •  SECTION      times 2   • COLONOSCOPY W/ POLYPECTOMY  2021   • ENDOSCOPY  2021    [facility]    • HYSTEROSCOPY ENDOMETRIAL ABLATION  2016    D&C polypectomy [facility]   • TUBAL ABDOMINAL LIGATION Bilateral        Family History:   Family History   Problem Relation Age of Onset   • Heart disease Mother    • Heart attack Father    • Stroke Father 79        on Coumadin   • No Known Problems Brother    • Breast cancer Neg Hx    • Ovarian cancer Neg Hx    • Colon cancer Neg Hx      No family history of bladder or kidney cancer  No family history of kidney stones.     Social History:   Social History     Socioeconomic History   • Marital status:    Tobacco Use   • Smoking status:  Former     Packs/day: 0.50     Types: Cigarettes     Quit date: 12/15/2022     Years since quittin.4   • Smokeless tobacco: Never   • Tobacco comments:     going to quit   Vaping Use   • Vaping Use: Never used   Substance and Sexual Activity   • Alcohol use: Not Currently   • Drug use: No   • Sexual activity: Yes     Partners: Male       Medications:     Current Outpatient Medications:   •  alendronate (FOSAMAX) 70 MG tablet, take in am 30min before eating or drinking, take once per week and with a full glass of water., Disp: , Rfl:   •  amLODIPine (NORVASC) 5 MG tablet, Take 1 tablet by mouth Daily., Disp: , Rfl:   •  Diclofenac Sodium (VOLTAREN) 1 % gel gel, APPLY 2 GRAMS TO AFFECTED AREA 4 TIMES A DAY AS NEEDED, Disp: , Rfl:   •  docusate sodium (COLACE) 250 MG capsule, Take 1 capsule by mouth 2 (Two) Times a Day. Hold for loose bowel movement, Disp: 60 capsule, Rfl: 1  •  ENBREL SURECLICK 50 MG/ML solution auto-injector, Inject 50 mg under the skin every 7 days., Disp: , Rfl:   •  estradiol (Estrace) 1 MG tablet, Take 1 tablet by mouth Daily., Disp: 90 tablet, Rfl: 0  •  Etanercept (Enbrel SureClick) 50 MG/ML solution auto-injector, Inject 1 mL under the skin into the appropriate area as directed Every 7 (Seven) Days., Disp: , Rfl:   •  folic acid (FOLVITE) 1 MG tablet, Take 5 tablets by mouth., Disp: , Rfl:   •  Linzess 145 MCG capsule capsule, Take 1 capsule by mouth Daily., Disp: , Rfl:   •  methotrexate 2.5 MG tablet, Take 1 tablet by mouth 1 (One) Time As Needed. 8 tabs weekly, Disp: , Rfl:   •  metoprolol succinate XL (TOPROL-XL) 25 MG 24 hr tablet, Take 1 tablet by mouth Daily., Disp: , Rfl:   •  nitrofurantoin, macrocrystal-monohydrate, (Macrobid) 100 MG capsule, Take 1 capsule by mouth 2 (Two) Times a Day., Disp: 14 capsule, Rfl: 0  •  omeprazole (PriLOSEC) 20 MG capsule, Take 1 capsule by mouth Daily., Disp: , Rfl:   •  omeprazole (priLOSEC) 40 MG capsule, take 1 capsule by mouth every day as  "directed, Disp: , Rfl:   •  pantoprazole (PROTONIX) 40 MG EC tablet, Take 1 tablet by mouth Daily., Disp: , Rfl:   •  pilocarpine (SALAGEN) 5 MG tablet, Take 1 tablet by mouth Daily., Disp: , Rfl:   •  Progesterone (PROMETRIUM) 100 MG capsule, Take 1 capsule by mouth Daily., Disp: 30 capsule, Rfl: 0  •  rivaroxaban (XARELTO) 15 MG tablet, Take 1 tablet by mouth 2 (Two) Times a Day With Meals. Contact MD for refill dosing., Disp: 42 tablet, Rfl: 0  •  rosuvastatin (CRESTOR) 5 MG tablet, Take 1 tablet by mouth Daily., Disp: , Rfl:     Allergies:   Allergies   Allergen Reactions   • Codeine Nausea And Vomiting and Hives       Objective     Physical Exam:   Vital Signs:   Vitals:    05/22/23 1529   BP: 112/72   BP Location: Right arm   Patient Position: Sitting   Cuff Size: Adult   Temp: 97.4 °F (36.3 °C)   TempSrc: Temporal   Weight: 75.8 kg (167 lb)   Height: 162.6 cm (64.02\")     Body mass index is 28.65 kg/m².     Physical exam was notable for non palpable bladder.    Genitourinary  Nonpalpable bladder    Labs  Brief Urine Lab Results  (Last result in the past 365 days)      Color   Clarity   Blood   Leuk Est   Nitrite   Protein   CREAT   Urine HCG        05/22/23 1527 Dark Yellow   Cloudy   3+   Small (1+)   Negative   2+                   Chemistry        Component Value Date/Time     10/08/2022 1826    K 3.9 10/08/2022 1826     10/08/2022 1826    CO2 22.1 10/08/2022 1826    BUN 18 10/08/2022 1826    CREATININE 0.93 10/08/2022 1826        Component Value Date/Time    CALCIUM 9.6 10/08/2022 1826    ALKPHOS 76 10/08/2022 1826    AST 20 10/08/2022 1826    ALT 11 10/08/2022 1826    BILITOT 0.4 10/08/2022 1826            Lab Results   Component Value Date    WBC 9.95 10/08/2022    HGB 13.9 10/08/2022    HCT 40.5 10/08/2022    MCV 93.3 10/08/2022     10/08/2022       No results found for: URINECX      Assessment / Plan      Assessment  67 y.o. female who presents with gross hematuria.  Risk factors " include recently started on Xarelto, history of smoking, gross hematuria, and age greater then 60.  In order to complete the hematuria work up we need to perform a flexible cystoscopy and acquire upper tract imaging.    Plan  1.  We discussed the indications for diagnostic flexible cystoscopy to be performed at the next clinic visit.  2.  Urine culture ordered  3.  Macrobid sent on demand while awaiting culture patient is aware not to start unless she starts having severe symptoms.  4.  CT urogram ordered    Follow Up:   Return for Dr. Willis cystoscopy.    AMBIKA Diane, NP-C  [facility]

## 2023-05-25 DIAGNOSIS — I82.4Y2 ACUTE DEEP VEIN THROMBOSIS (DVT) OF PROXIMAL VEIN OF LEFT LOWER EXTREMITY: Primary | ICD-10-CM

## 2023-05-25 LAB
BACTERIA UR CULT: ABNORMAL
BACTERIA UR CULT: ABNORMAL
GLUCOSE UR QL STRIP: NORMAL
KETONES UR QL STRIP: NORMAL
OTHER ANTIBIOTIC SUSC ISLT: ABNORMAL
PH UR STRIP: NORMAL [PH]
PROT UR QL STRIP: NORMAL
SP GR UR STRIP: NORMAL
SPECIMEN STATUS: NORMAL

## 2023-05-25 RX ORDER — WARFARIN SODIUM 1 MG/1
1 TABLET ORAL NIGHTLY
Qty: 30 TABLET | Refills: 1 | Status: SHIPPED | OUTPATIENT
Start: 2023-05-25

## 2023-06-08 ENCOUNTER — HOSPITAL ENCOUNTER (OUTPATIENT)
Dept: CT IMAGING | Facility: HOSPITAL | Age: 68
Discharge: HOME | End: 2023-06-08
Payer: MEDICARE

## 2023-06-08 DIAGNOSIS — R31.0 GROSS HEMATURIA: ICD-10-CM

## 2023-06-08 PROCEDURE — 25510000001 IOPAMIDOL 61 % SOLUTION: Performed by: NURSE PRACTITIONER

## 2023-06-08 PROCEDURE — 74178 CT ABD&PLV WO CNTR FLWD CNTR: CPT

## 2023-06-08 RX ADMIN — IOPAMIDOL 100 ML: 612 INJECTION, SOLUTION INTRAVENOUS at 10:03

## 2023-07-12 PROBLEM — M06.9 RHEUMATOID ARTHRITIS: Status: ACTIVE | Noted: 2017-07-10

## 2023-07-12 PROBLEM — I10 ESSENTIAL HYPERTENSION: Status: ACTIVE | Noted: 2017-07-06

## 2023-07-12 PROBLEM — E03.9 HYPOTHYROIDISM: Status: ACTIVE | Noted: 2022-12-26

## 2023-07-12 PROBLEM — B02.9 HERPES ZOSTER: Status: ACTIVE | Noted: 2020-02-10

## 2023-07-21 PROBLEM — G47.33 OSA (OBSTRUCTIVE SLEEP APNEA): Status: ACTIVE | Noted: 2023-07-21

## 2023-07-21 PROBLEM — I82.412 DVT FEMORAL (DEEP VENOUS THROMBOSIS) WITH THROMBOPHLEBITIS, LEFT: Status: ACTIVE | Noted: 2023-07-21

## 2023-07-21 PROBLEM — R79.9 ABNORMAL FINDING OF BLOOD CHEMISTRY, UNSPECIFIED: Status: ACTIVE | Noted: 2023-07-21

## 2023-07-21 PROBLEM — R23.2 HOT FLASHES: Status: ACTIVE | Noted: 2023-07-21

## 2023-07-27 ENCOUNTER — TELEPHONE (OUTPATIENT)
Dept: INTERNAL MEDICINE | Facility: CLINIC | Age: 68
End: 2023-07-27

## 2023-07-27 ENCOUNTER — PROCEDURE VISIT (OUTPATIENT)
Dept: UROLOGY | Facility: CLINIC | Age: 68
End: 2023-07-27
Payer: MEDICARE

## 2023-07-27 DIAGNOSIS — N39.41 URGE INCONTINENCE OF URINE: Primary | ICD-10-CM

## 2023-07-27 NOTE — PROGRESS NOTES
Preprocedure diagnosis  Gross hematuria    Postprocedure diagnosis  No abnormalities identified  Urge incontinence    Procedure  Flexible Cystourethroscopy    Attending surgeon  Theodore Willis MD    Anesthesia  2% lidocaine jelly intraurethrally    Complications  None    Indications  67 y.o. female undergoing a flexible cystoscopy for the above mentioned indications.    Informed consent was obtained.      Findings  Cystoscopy revealed one right and left ureteral orifice in the normal anatomic position, normal bladder mucosa and no tumors, masses or stones.      She did not have demonstrable stress incontinence on pelvic exam.  She did have grade 2 rectocele.    Procedure  The patient was placed in supine position and prepped and draped in sterile fashion with lidocaine jelly per urethra for anesthesia.  A timeout was performed.  The 14F flexible cystoscope was lubricated and gently placed through the urethra and into the bladder.  The bladder was completely visualized.  The cystoscope was retroflexed and the bladder neck visualized.  The scope was withdrawn and the procedure terminated.  The patient tolerated the procedure well.      CT Abdomen Pelvis With & Without Contrast  Result Date: 6/8/2023  There is a 3 mm nonobstructing stone in the dependent portion of the right renal pelvis. There is a 5 mm nonenhancing hypodense lesion in the right renal superior pole, compatible with Bosniak type I cyst. No suspicious renal lesions.  Small hiatal hernia.  Mild colonic diverticulosis without evidence of acute diverticulitis.     Images personally reviewed, interpreted and dictated by NISSA Cook.   1373.28 mGy.cm   This study was performed with techniques to keep radiation doses as low as reasonably achievable (ALARA). Individualized dose reduction techniques using automated exposure control or adjustment of mA and/or kV according to the patient size were employed.      This report was signed and  finalized on 6/8/2023 10:40 AM by NISSA Cook.    Plan  I told her that her hematuria is likely from the small kidney stone.  I have started her on Gemtesa for her urge incontinence, for which she uses 2 pads per day.  We will have her follow-up with my JOLYNN in 2 months to see if it is working.

## 2023-07-27 NOTE — TELEPHONE ENCOUNTER
Caller: Tiffany Martinez    Relationship: Self    Best call back number:  [phone]     Who are you requesting to speak with (clinical staff, provider,  specific staff member):  CLINICAL     What was the call regarding: PATIENT SAW HER MY CHART BLOOD   LAB WORK CAME BACK AND SHOWED SEVERAL HIGH   PATIENT WANTS TO KNOW IF SHE NEEDS TO SEE DR MIRANDA SOONER THAN 10-25-23    PLEASE CALL

## 2023-07-28 ENCOUNTER — PATIENT ROUNDING (BHMG ONLY) (OUTPATIENT)
Dept: INTERNAL MEDICINE | Facility: CLINIC | Age: 68
End: 2023-07-28

## 2023-07-28 ENCOUNTER — TELEPHONE (OUTPATIENT)
Dept: INTERNAL MEDICINE | Facility: CLINIC | Age: 68
End: 2023-07-28

## 2023-07-28 ENCOUNTER — OFFICE VISIT (OUTPATIENT)
Dept: INTERNAL MEDICINE | Facility: CLINIC | Age: 68
End: 2023-07-28
Payer: MEDICARE

## 2023-07-28 VITALS
TEMPERATURE: 96.9 F | WEIGHT: 163 LBS | SYSTOLIC BLOOD PRESSURE: 128 MMHG | RESPIRATION RATE: 16 BRPM | HEIGHT: 64 IN | HEART RATE: 87 BPM | DIASTOLIC BLOOD PRESSURE: 84 MMHG | BODY MASS INDEX: 27.83 KG/M2 | OXYGEN SATURATION: 96 %

## 2023-07-28 DIAGNOSIS — N39.41 URGE INCONTINENCE OF URINE: ICD-10-CM

## 2023-07-28 DIAGNOSIS — M19.90 ARTHRITIS: ICD-10-CM

## 2023-07-28 DIAGNOSIS — A77.0 RMSF (ROCKY MOUNTAIN SPOTTED FEVER): Primary | ICD-10-CM

## 2023-07-28 NOTE — PROGRESS NOTES
A Sofea message has been sent to patient for PATIENT ROUNDING with [facility].

## 2023-07-28 NOTE — PROGRESS NOTES
Subjective     Patient ID: Tiffany Martinez is a 67 y.o. female. Patient is here for management of multiple medical problems.     Chief Complaint   Patient presents with    Rheumatoid Arthritis     History of Present Illness   RA vs other issues causing arthritis. Left knee and occ in hands.  Occ lower back pain.               The following portions of the patient's history were reviewed and updated as appropriate: allergies, current medications, past family history, past medical history, past social history, past surgical history and problem list.    Review of Systems    Current Outpatient Medications:     amLODIPine (NORVASC) 5 MG tablet, Take 1 tablet by mouth Daily., Disp: , Rfl:     Diclofenac Sodium (VOLTAREN) 1 % gel gel, APPLY 2 GRAMS TO AFFECTED AREA 4 TIMES A DAY AS NEEDED, Disp: , Rfl:     ENBREL SURECLICK 50 MG/ML solution auto-injector, Inject 50 mg under the skin every 7 days., Disp: , Rfl:     Etanercept (Enbrel SureClick) 50 MG/ML solution auto-injector, Inject 1 mL under the skin into the appropriate area as directed Every 7 (Seven) Days., Disp: , Rfl:     folic acid (FOLVITE) 1 MG tablet, Take 5 tablets by mouth., Disp: , Rfl:     Linzess 145 MCG capsule capsule, Take 1 capsule by mouth Daily., Disp: , Rfl:     methotrexate 2.5 MG tablet, Take 1 tablet by mouth 1 (One) Time As Needed. 8 tabs weekly, Disp: , Rfl:     metoprolol succinate XL (TOPROL-XL) 25 MG 24 hr tablet, Take 1 tablet by mouth Daily., Disp: , Rfl:     omeprazole (PriLOSEC) 20 MG capsule, Take 1 capsule by mouth Daily., Disp: , Rfl:     pantoprazole (PROTONIX) 40 MG EC tablet, Take 1 tablet by mouth Daily., Disp: , Rfl:     pilocarpine (SALAGEN) 5 MG tablet, Take 1 tablet by mouth Daily., Disp: , Rfl:     rivaroxaban (XARELTO) 20 MG tablet, Take 1 tablet by mouth Daily., Disp: , Rfl:     rosuvastatin (CRESTOR) 5 MG tablet, Take 1 tablet by mouth Daily., Disp: , Rfl:     venlafaxine XR (Effexor XR) 37.5 MG 24 hr capsule, Take 1  "capsule by mouth Daily., Disp: 90 capsule, Rfl: 3    Vibegron 75 MG tablet, Take 1 tablet by mouth Daily., Disp: 30 tablet, Rfl: 11    Objective      Blood pressure 128/84, pulse 87, temperature 96.9 °F (36.1 °C), resp. rate 16, height 162.6 cm (64\"), weight 73.9 kg (163 lb), SpO2 96 %, not currently breastfeeding.    Physical Exam     General Appearance:    Alert, cooperative, no distress, appears stated age   Head:    Normocephalic, without obvious abnormality, atraumatic   Eyes:    PERRL, conjunctiva/corneas clear, EOM's intact   Ears:    Normal TM's and external ear canals, both ears   Nose:   Nares normal, septum midline, mucosa normal, no drainage   or sinus tenderness   Throat:   Lips, mucosa, and tongue normal; teeth and gums normal   Neck:   Supple, symmetrical, trachea midline, no adenopathy;        thyroid:  No enlargement/tenderness/nodules; no carotid    bruit or JVD   Back:     Symmetric, no curvature, ROM normal, no CVA tenderness   Lungs:     Clear to auscultation bilaterally, respirations unlabored   Chest wall:    No tenderness or deformity   Heart:    Regular rate and rhythm, S1 and S2 normal, no murmur,        rub or gallop   Abdomen:     Soft, non-tender, bowel sounds active all four quadrants,     no masses, no organomegaly   Extremities:   Extremities normal, atraumatic, no cyanosis or edema   Pulses:   2+ and symmetric all extremities   Skin:   Skin color, texture, turgor normal, no rashes or lesions   Lymph nodes:   Cervical, supraclavicular, and axillary nodes normal   Neurologic:   CNII-XII intact. Normal strength, sensation and reflexes       throughout      Results for orders placed or performed in visit on 07/21/23   Lyme Disease, PCR - , Arm, Right    Specimen: Arm, Right   Result Value Ref Range    Lyme Disease(B.burgdorferi)PCR Negative Negative   Lipid Panel    Specimen: Blood   Result Value Ref Range    Total Cholesterol 170 100 - 199 mg/dL    Triglycerides 134 0 - 149 mg/dL    HDL " Cholesterol 53 >39 mg/dL    VLDL Cholesterol Cesra 24 5 - 40 mg/dL    LDL Chol Calc (NIH) 93 0 - 99 mg/dL   Vitamin B12    Specimen: Blood   Result Value Ref Range    Vitamin B-12 513 232 - 1,245 pg/mL   Comprehensive Metabolic Panel    Specimen: Blood   Result Value Ref Range    Glucose 99 70 - 99 mg/dL    BUN 16 8 - 27 mg/dL    Creatinine 0.86 0.57 - 1.00 mg/dL    EGFR Result 74 >59 mL/min/1.73    BUN/Creatinine Ratio 19 12 - 28    Sodium 139 134 - 144 mmol/L    Potassium 4.1 3.5 - 5.2 mmol/L    Chloride 100 96 - 106 mmol/L    Total CO2 21 20 - 29 mmol/L    Calcium 9.0 8.7 - 10.3 mg/dL    Total Protein 7.4 6.0 - 8.5 g/dL    Albumin 4.4 3.9 - 4.9 g/dL    Globulin 3.0 1.5 - 4.5 g/dL    A/G Ratio 1.5 1.2 - 2.2    Total Bilirubin 0.3 0.0 - 1.2 mg/dL    Alkaline Phosphatase 90 44 - 121 IU/L    AST (SGOT) 27 0 - 40 IU/L    ALT (SGPT) 20 0 - 32 IU/L   TSH    Specimen: Blood   Result Value Ref Range    TSH 4.960 (H) 0.450 - 4.500 uIU/mL   T4, Free    Specimen: Blood   Result Value Ref Range    Free T4 1.16 0.82 - 1.77 ng/dL   Hemoglobin A1c    Specimen: Blood   Result Value Ref Range    Hemoglobin A1C 5.7 (H) 4.8 - 5.6 %   [place] Spotted Fever, IgM    Specimen: Blood   Result Value Ref Range    RMSF IgM 2.55 (H) 0.00 - 0.89 index   [facility] Spotted Fever, IgG    Specimen: Blood   Result Value Ref Range    RMSF IgG Negative Negative   Ehrlichia Antibody Panel    Specimen: Blood   Result Value Ref Range    E. chaffeensis (HME) IgG Titer Negative Neg:<1:64    E. chaffeensis (HME) IgM Titer Negative Neg:<1:20    HGE IgG Titer Negative Neg:<1:64    HGE IgM Titer Negative Neg:<1:20    RESULT COMMENT: Comment    Cyclic Citrul Peptide Antibody, IgG / IgA    Specimen: Blood   Result Value Ref Range    CCP Antibodies IgG/IgA >250 (H) 0 - 19 units   Rheumatoid Factor    Specimen: Blood   Result Value Ref Range    RA Latex Turbid 480.2 (H) <14.0 IU/mL   Sedimentation Rate    Specimen: Blood   Result Value Ref Range    Sed Rate  45 (H) 0 - 40 mm/hr   Uric Acid    Specimen: Blood   Result Value Ref Range    Uric Acid 4.6 3.0 - 7.2 mg/dL   C-reactive Protein    Specimen: Blood   Result Value Ref Range    C-Reactive Protein 6 0 - 10 mg/L   Antistreptolysin O Titer    Specimen: Blood   Result Value Ref Range    ASO 23.5 0.0 - 200.0 IU/mL   CK    Specimen: Blood   Result Value Ref Range    Creatine Kinase 58 32 - 182 U/L   Myoglobin, Serum    Specimen: Blood   Result Value Ref Range    Myoglobin 26 25 - 58 ng/mL   CBC & Differential    Specimen: Blood   Result Value Ref Range    WBC 5.4 3.4 - 10.8 x10E3/uL    RBC 4.46 3.77 - 5.28 x10E6/uL    Hemoglobin 13.8 11.1 - 15.9 g/dL    Hematocrit 41.5 34.0 - 46.6 %    MCV 93 79 - 97 fL    MCH 30.9 26.6 - 33.0 pg    MCHC 33.3 31.5 - 35.7 g/dL    RDW 13.2 11.7 - 15.4 %    Platelets 219 150 - 450 x10E3/uL    Neutrophil Rel % 66 Not Estab. %    Lymphocyte Rel % 23 Not Estab. %    Monocyte Rel % 6 Not Estab. %    Eosinophil Rel % 5 Not Estab. %    Basophil Rel % 0 Not Estab. %    Neutrophils Absolute 3.5 1.4 - 7.0 x10E3/uL    Lymphocytes Absolute 1.2 0.7 - 3.1 x10E3/uL    Monocytes Absolute 0.3 0.1 - 0.9 x10E3/uL    Eosinophils Absolute 0.3 0.0 - 0.4 x10E3/uL    Basophils Absolute 0.0 0.0 - 0.2 x10E3/uL    Immature Granulocyte Rel % 0 Not Estab. %    Immature Grans Absolute 0.0 0.0 - 0.1 x10E3/uL         Assessment & Plan     + RMSF    Symptoms. Arthritis in knee. Occ hands and back. Not RA findings in hands no synovitis.  Questionable memory issues.      +CCP + RF may be related to underlying illness due to lack of exam findings. Will repeat after RMSF treament.    Will start with doxy and reassess for symptom improvement. 6 week minimum of treatment. Will tran to augmenting if no improved in the first 2 weeks.            Diagnoses and all orders for this visit:    1. RMSF ([place] spotted fever) (Primary)    2. Arthritis      Return in about 8 weeks (around 9/22/2023).          There are no Patient  Instructions on file for this visit.     Santo Pinon MD    Assessment & Plan

## 2023-07-31 RX ORDER — DOXYCYCLINE HYCLATE 100 MG/1
100 CAPSULE ORAL 2 TIMES DAILY
Qty: 60 CAPSULE | Refills: 1 | Status: SHIPPED | OUTPATIENT
Start: 2023-07-31

## 2023-08-02 ENCOUNTER — HOSPITAL ENCOUNTER (OUTPATIENT)
Dept: ULTRASOUND IMAGING | Facility: HOSPITAL | Age: 68
Discharge: HOME | End: 2023-08-02
Admitting: INTERNAL MEDICINE
Payer: MEDICARE

## 2023-08-02 DIAGNOSIS — R23.2 HOT FLASHES: ICD-10-CM

## 2023-08-02 DIAGNOSIS — E03.9 HYPOTHYROIDISM, UNSPECIFIED TYPE: ICD-10-CM

## 2023-08-02 DIAGNOSIS — I10 HYPERTENSION, UNSPECIFIED TYPE: ICD-10-CM

## 2023-08-02 DIAGNOSIS — M06.9 RHEUMATOID ARTHRITIS, INVOLVING UNSPECIFIED SITE, UNSPECIFIED WHETHER RHEUMATOID FACTOR PRESENT: ICD-10-CM

## 2023-08-02 DIAGNOSIS — I82.412 DVT FEMORAL (DEEP VENOUS THROMBOSIS) WITH THROMBOPHLEBITIS, LEFT: ICD-10-CM

## 2023-08-02 DIAGNOSIS — M25.511 ACUTE PAIN OF RIGHT SHOULDER: ICD-10-CM

## 2023-08-02 PROCEDURE — 93971 EXTREMITY STUDY: CPT

## 2023-08-07 DIAGNOSIS — G47.33 OSA (OBSTRUCTIVE SLEEP APNEA): Primary | ICD-10-CM

## 2023-08-10 ENCOUNTER — TELEMEDICINE (OUTPATIENT)
Dept: UROLOGY | Facility: CLINIC | Age: 68
End: 2023-08-10
Payer: MEDICARE

## 2023-08-10 DIAGNOSIS — N39.41 URGE INCONTINENCE OF URINE: Primary | ICD-10-CM

## 2023-08-10 RX ORDER — TROSPIUM CHLORIDE ER 60 MG/1
60 CAPSULE ORAL EVERY MORNING
Qty: 30 CAPSULE | Refills: 3 | Status: SHIPPED | OUTPATIENT
Start: 2023-08-10

## 2023-08-10 NOTE — PROGRESS NOTES
Connected to visit via [employer] patient was at home for visit.    Today we discussed trial of medication for urge incontinence.  Prescription for Gemtesa was sent in at which was over $300 a month out-of-pocket for patient this is not cost effective for her.  We discussed the options of anticholinergics versus beta agonists.  Patient does have elevated blood pressure would like to avoid Myrbetriq due to its side effect of elevating blood pressure.  We discussed anticholinergics including dry mouth dry eye constipation.  Will trial trospium 60 mg SR daily to see if this is more affordable for patient and has decreased side effects compared to other anticholinergics.    Start 60 mg SR trospium daily    Keep scheduled follow-up in September.

## 2023-09-20 ENCOUNTER — TELEPHONE (OUTPATIENT)
Dept: UROLOGY | Facility: CLINIC | Age: 68
End: 2023-09-20
Payer: MEDICARE

## 2023-09-20 ENCOUNTER — DOCUMENTATION (OUTPATIENT)
Dept: UROLOGY | Facility: CLINIC | Age: 68
End: 2023-09-20
Payer: MEDICARE

## 2023-09-20 DIAGNOSIS — N39.41 URGE INCONTINENCE OF URINE: Primary | ICD-10-CM

## 2023-09-20 RX ORDER — VIBEGRON 75 MG/1
75 TABLET, FILM COATED ORAL DAILY
Qty: 30 TABLET | Refills: 11 | Status: SHIPPED | OUTPATIENT
Start: 2023-09-20 | End: 2023-09-25

## 2023-09-21 ENCOUNTER — TELEPHONE (OUTPATIENT)
Dept: UROLOGY | Facility: CLINIC | Age: 68
End: 2023-09-21

## 2023-09-21 NOTE — TELEPHONE ENCOUNTER
Provider: VEDA IRWIN    Caller: Tiffany Munoz    Relationship to Patient: Self     Phone Number: [phone]    Reason for Call: RX BIN NUMBER    When was the patient last seen: 05/22/23    Notes: MS. MUNOZ SAYS THE RX BIN NUMBER IS [id number].      PATIENT IS ALSO AWARE OF NEW APPOINTMENT DETAILS (10/27/23 @ 1:50 PM).

## 2023-09-25 ENCOUNTER — OFFICE VISIT (OUTPATIENT)
Dept: INTERNAL MEDICINE | Facility: CLINIC | Age: 68
End: 2023-09-25

## 2023-09-25 VITALS
HEART RATE: 70 BPM | DIASTOLIC BLOOD PRESSURE: 84 MMHG | OXYGEN SATURATION: 97 % | HEIGHT: 64 IN | TEMPERATURE: 97 F | WEIGHT: 162 LBS | BODY MASS INDEX: 27.66 KG/M2 | SYSTOLIC BLOOD PRESSURE: 124 MMHG | RESPIRATION RATE: 16 BRPM

## 2023-09-25 DIAGNOSIS — B37.0 THRUSH: Primary | ICD-10-CM

## 2023-09-25 PROCEDURE — 99213 OFFICE O/P EST LOW 20 MIN: CPT | Performed by: INTERNAL MEDICINE

## 2023-09-25 PROCEDURE — 3079F DIAST BP 80-89 MM HG: CPT | Performed by: INTERNAL MEDICINE

## 2023-09-25 PROCEDURE — 3074F SYST BP LT 130 MM HG: CPT | Performed by: INTERNAL MEDICINE

## 2023-09-25 RX ORDER — MELOXICAM 15 MG/1
15 TABLET ORAL DAILY
COMMUNITY
Start: 2023-08-07

## 2023-09-25 RX ORDER — OMEPRAZOLE 20 MG/1
20 CAPSULE, DELAYED RELEASE ORAL DAILY
COMMUNITY

## 2023-09-25 RX ORDER — OMEPRAZOLE 40 MG/1
40 CAPSULE, DELAYED RELEASE ORAL DAILY
COMMUNITY
Start: 2023-08-05 | End: 2023-09-25

## 2023-09-25 NOTE — PROGRESS NOTES
Subjective     Patient ID: Tiffany Martinez is a 68 y.o. female. Patient is here for management of multiple medical problems.     Chief Complaint   Patient presents with    RMSF    Rheumatoid Arthritis    Dry Mouth     Dry mouth, and patient complaints of a sticky film all throughout her mouth when she wakes up in the morning.     History of Present Illness   Dry mouth. Glue like sensation in mouth.   The following portions of the patient's history were reviewed and updated as appropriate: allergies, current medications, past family history, past medical history, past social history, past surgical history and problem list.    Review of Systems    Current Outpatient Medications:     amLODIPine (NORVASC) 5 MG tablet, Take 1 tablet by mouth Daily., Disp: , Rfl:     Diclofenac Sodium (VOLTAREN) 1 % gel gel, APPLY 2 GRAMS TO AFFECTED AREA 4 TIMES A DAY AS NEEDED, Disp: , Rfl:     doxycycline (VIBRAMYCIN) 100 MG capsule, Take 1 capsule by mouth 2 (Two) Times a Day., Disp: 60 capsule, Rfl: 1    ENBREL SURECLICK 50 MG/ML solution auto-injector, Inject 50 mg under the skin every 7 days., Disp: , Rfl:     Etanercept (Enbrel SureClick) 50 MG/ML solution auto-injector, Inject 1 mL under the skin into the appropriate area as directed Every 7 (Seven) Days., Disp: , Rfl:     folic acid (FOLVITE) 1 MG tablet, Take 5 tablets by mouth., Disp: , Rfl:     Linzess 145 MCG capsule capsule, Take 1 capsule by mouth Daily., Disp: , Rfl:     meloxicam (MOBIC) 15 MG tablet, Take 1 tablet by mouth Daily., Disp: , Rfl:     methotrexate 2.5 MG tablet, Take 1 tablet by mouth 1 (One) Time As Needed. 8 tabs weekly, Disp: , Rfl:     metoprolol succinate XL (TOPROL-XL) 25 MG 24 hr tablet, Take 1 tablet by mouth Daily., Disp: , Rfl:     omeprazole (priLOSEC) 20 MG capsule, Take 1 capsule by mouth Daily., Disp: , Rfl:     pantoprazole (PROTONIX) 40 MG EC tablet, Take 1 tablet by mouth Daily., Disp: , Rfl:     pilocarpine (SALAGEN) 5 MG tablet, Take 1  "tablet by mouth Daily., Disp: , Rfl:     rivaroxaban (XARELTO) 20 MG tablet, Take 1 tablet by mouth Daily., Disp: , Rfl:     rosuvastatin (CRESTOR) 5 MG tablet, Take 1 tablet by mouth Daily., Disp: , Rfl:     venlafaxine XR (Effexor XR) 37.5 MG 24 hr capsule, Take 1 capsule by mouth Daily., Disp: 90 capsule, Rfl: 3    nystatin (MYCOSTATIN) 100,000 unit/mL suspension, Swish and swallow 5 mL 4 (Four) Times a Day., Disp: 120 mL, Rfl: 1    Objective      Blood pressure 124/84, pulse 70, temperature 97 °F (36.1 °C), resp. rate 16, height 162.6 cm (64\"), weight 73.5 kg (162 lb), SpO2 97 %, not currently breastfeeding.    Physical Exam     General Appearance:    Alert, cooperative, no distress, appears stated age   Head:    Normocephalic, without obvious abnormality, atraumatic   Eyes:    PERRL, conjunctiva/corneas clear, EOM's intact   Ears:    Normal TM's and external ear canals, both ears   Nose:   Nares normal, septum midline, mucosa normal, no drainage   or sinus tenderness   Throat:   Lips, mucosa, and tongue normal; teeth and gums normal   Neck:   Supple, symmetrical, trachea midline, no adenopathy;        thyroid:  No enlargement/tenderness/nodules; no carotid    bruit or JVD   Back:     Symmetric, no curvature, ROM normal, no CVA tenderness   Lungs:     Clear to auscultation bilaterally, respirations unlabored   Chest wall:    No tenderness or deformity   Heart:    Regular rate and rhythm, S1 and S2 normal, no murmur,        rub or gallop   Abdomen:     Soft, non-tender, bowel sounds active all four quadrants,     no masses, no organomegaly   Extremities:   Extremities normal, atraumatic, no cyanosis or edema   Pulses:   2+ and symmetric all extremities   Skin:   Skin color, texture, turgor normal, no rashes or lesions   Lymph nodes:   Cervical, supraclavicular, and axillary nodes normal   Neurologic:   CNII-XII intact. Normal strength, sensation and reflexes       throughout      Results for orders placed or " performed in visit on 07/21/23   Lyme Disease, PCR - , Arm, Right    Specimen: Arm, Right   Result Value Ref Range    Lyme Disease(B.burgdorferi)PCR Negative Negative   Lipid Panel    Specimen: Blood   Result Value Ref Range    Total Cholesterol 170 100 - 199 mg/dL    Triglycerides 134 0 - 149 mg/dL    HDL Cholesterol 53 >39 mg/dL    VLDL Cholesterol Cesar 24 5 - 40 mg/dL    LDL Chol Calc (NIH) 93 0 - 99 mg/dL   Vitamin B12    Specimen: Blood   Result Value Ref Range    Vitamin B-12 513 232 - 1,245 pg/mL   Comprehensive Metabolic Panel    Specimen: Blood   Result Value Ref Range    Glucose 99 70 - 99 mg/dL    BUN 16 8 - 27 mg/dL    Creatinine 0.86 0.57 - 1.00 mg/dL    EGFR Result 74 >59 mL/min/1.73    BUN/Creatinine Ratio 19 12 - 28    Sodium 139 134 - 144 mmol/L    Potassium 4.1 3.5 - 5.2 mmol/L    Chloride 100 96 - 106 mmol/L    Total CO2 21 20 - 29 mmol/L    Calcium 9.0 8.7 - 10.3 mg/dL    Total Protein 7.4 6.0 - 8.5 g/dL    Albumin 4.4 3.9 - 4.9 g/dL    Globulin 3.0 1.5 - 4.5 g/dL    A/G Ratio 1.5 1.2 - 2.2    Total Bilirubin 0.3 0.0 - 1.2 mg/dL    Alkaline Phosphatase 90 44 - 121 IU/L    AST (SGOT) 27 0 - 40 IU/L    ALT (SGPT) 20 0 - 32 IU/L   TSH    Specimen: Blood   Result Value Ref Range    TSH 4.960 (H) 0.450 - 4.500 uIU/mL   T4, Free    Specimen: Blood   Result Value Ref Range    Free T4 1.16 0.82 - 1.77 ng/dL   Hemoglobin A1c    Specimen: Blood   Result Value Ref Range    Hemoglobin A1C 5.7 (H) 4.8 - 5.6 %   [place] Spotted Fever, IgM    Specimen: Blood   Result Value Ref Range    RMSF IgM 2.55 (H) 0.00 - 0.89 index   [facility] Spotted Fever, IgG    Specimen: Blood   Result Value Ref Range    RMSF IgG Negative Negative   Ehrlichia Antibody Panel    Specimen: Blood   Result Value Ref Range    E. chaffeensis (HME) IgG Titer Negative Neg:<1:64    E. chaffeensis (HME) IgM Titer Negative Neg:<1:20    HGE IgG Titer Negative Neg:<1:64    HGE IgM Titer Negative Neg:<1:20    RESULT COMMENT: Comment    Cyclic  Citrul Peptide Antibody, IgG / IgA    Specimen: Blood   Result Value Ref Range    CCP Antibodies IgG/IgA >250 (H) 0 - 19 units   Rheumatoid Factor    Specimen: Blood   Result Value Ref Range    RA Latex Turbid 480.2 (H) <14.0 IU/mL   Sedimentation Rate    Specimen: Blood   Result Value Ref Range    Sed Rate 45 (H) 0 - 40 mm/hr   Uric Acid    Specimen: Blood   Result Value Ref Range    Uric Acid 4.6 3.0 - 7.2 mg/dL   C-reactive Protein    Specimen: Blood   Result Value Ref Range    C-Reactive Protein 6 0 - 10 mg/L   Antistreptolysin O Titer    Specimen: Blood   Result Value Ref Range    ASO 23.5 0.0 - 200.0 IU/mL   CK    Specimen: Blood   Result Value Ref Range    Creatine Kinase 58 32 - 182 U/L   Myoglobin, Serum    Specimen: Blood   Result Value Ref Range    Myoglobin 26 25 - 58 ng/mL   CBC & Differential    Specimen: Blood   Result Value Ref Range    WBC 5.4 3.4 - 10.8 x10E3/uL    RBC 4.46 3.77 - 5.28 x10E6/uL    Hemoglobin 13.8 11.1 - 15.9 g/dL    Hematocrit 41.5 34.0 - 46.6 %    MCV 93 79 - 97 fL    MCH 30.9 26.6 - 33.0 pg    MCHC 33.3 31.5 - 35.7 g/dL    RDW 13.2 11.7 - 15.4 %    Platelets 219 150 - 450 x10E3/uL    Neutrophil Rel % 66 Not Estab. %    Lymphocyte Rel % 23 Not Estab. %    Monocyte Rel % 6 Not Estab. %    Eosinophil Rel % 5 Not Estab. %    Basophil Rel % 0 Not Estab. %    Neutrophils Absolute 3.5 1.4 - 7.0 x10E3/uL    Lymphocytes Absolute 1.2 0.7 - 3.1 x10E3/uL    Monocytes Absolute 0.3 0.1 - 0.9 x10E3/uL    Eosinophils Absolute 0.3 0.0 - 0.4 x10E3/uL    Basophils Absolute 0.0 0.0 - 0.2 x10E3/uL    Immature Granulocyte Rel % 0 Not Estab. %    Immature Grans Absolute 0.0 0.0 - 0.1 x10E3/uL         Assessment & Plan   Dry mouth. Glue like sensation in mouth.   Likely thrush from abx. Toung with flat with out obvious papilla.     Folic acid take 5 a day. MTX 8 pills a week.  No recenet changes.  Also on Enbel.          Diagnoses and all orders for this visit:    1. Thrush (Primary)    Other orders  -      nystatin (MYCOSTATIN) 100,000 unit/mL suspension; Swish and swallow 5 mL 4 (Four) Times a Day.  Dispense: 120 mL; Refill: 1      Return in about 6 weeks (around 11/6/2023).          There are no Patient Instructions on file for this visit.     Santo Pinon MD    Assessment & Plan     Answers submitted by the patient for this visit:  Other (Submitted on 9/20/2023)  Please describe your symptoms.: Dry mouth  Have you had these symptoms before?: Yes  How long have you been having these symptoms?: Greater than 2 weeks  Please describe any probable cause for these symptoms. : Strange taste, anything with acid burns inside of mouth  Primary Reason for Visit (Submitted on 9/20/2023)  What is the primary reason for your visit?: Other

## 2023-09-27 ENCOUNTER — TELEPHONE (OUTPATIENT)
Dept: UROLOGY | Facility: CLINIC | Age: 68
End: 2023-09-27

## 2023-09-27 NOTE — TELEPHONE ENCOUNTER
Let pt know even with insurance the medication was still 236 and she could get online coupon but if she was not wanting to do that I could let any know and try another medication    CD,CMA

## 2023-10-13 ENCOUNTER — APPOINTMENT (OUTPATIENT)
Dept: ULTRASOUND IMAGING | Facility: HOSPITAL | Age: 68
End: 2023-10-13
Payer: MEDICARE

## 2023-10-13 ENCOUNTER — HOSPITAL ENCOUNTER (EMERGENCY)
Facility: HOSPITAL | Age: 68
Discharge: HOME | End: 2023-10-13
Attending: STUDENT IN AN ORGANIZED HEALTH CARE EDUCATION/TRAINING PROGRAM
Payer: MEDICARE

## 2023-10-13 VITALS
TEMPERATURE: 97.8 F | HEART RATE: 78 BPM | OXYGEN SATURATION: 98 % | WEIGHT: 158.2 LBS | SYSTOLIC BLOOD PRESSURE: 141 MMHG | BODY MASS INDEX: 27.01 KG/M2 | DIASTOLIC BLOOD PRESSURE: 81 MMHG | RESPIRATION RATE: 18 BRPM | HEIGHT: 64 IN

## 2023-10-13 DIAGNOSIS — M71.22 BAKER'S CYST, LEFT: Primary | ICD-10-CM

## 2023-10-13 PROCEDURE — 99284 EMERGENCY DEPT VISIT MOD MDM: CPT

## 2023-10-13 PROCEDURE — 93971 EXTREMITY STUDY: CPT

## 2023-10-13 NOTE — ED PROVIDER NOTES
Subjective  History of Present Illness:    Chief Complaint:   Chief Complaint   Patient presents with    Lower Extremity Issue      History of Present Illness: Tiffany Martinez is a 68 y.o. female who presents to the emergency department complaining of left calf pain.  History of DVT earlier this year after surgery.  3 months later had her Xarelto discontinued and has had no issues since.  Left calf pain since yesterday.  No chest pain or shortness of breath.  Onset: Yesterday  Duration: Ongoing  Exacerbating / Alleviating factors: None  Associated symptoms: None      Nurses Notes reviewed and agree, including vitals, allergies, social history and prior medical history.     Review of Systems   Constitutional: Negative.    HENT: Negative.     Eyes: Negative.    Respiratory: Negative.     Cardiovascular: Negative.    Gastrointestinal: Negative.    Genitourinary: Negative.    Musculoskeletal:         Left calf pain   Skin: Negative.    Neurological: Negative.    Psychiatric/Behavioral: Negative.         Past Medical History:   Diagnosis Date    Acid reflux     Arrhythmia     Colon polyp 2015    Diverticulosis 2015    Hemorrhoids, internal 2015    Hyperlipidemia     Hypertension     Rheumatoid arthritis     Shingles 2020    Left abd/flank/back       Allergies:    Codeine      Past Surgical History:   Procedure Laterality Date     SECTION      times 2    COLONOSCOPY W/ POLYPECTOMY  2021    ENDOSCOPY  2021    [facility]     HYSTEROSCOPY ENDOMETRIAL ABLATION  2016    D&C polypectomy [facility]    TUBAL ABDOMINAL LIGATION Bilateral          Social History     Socioeconomic History    Marital status:    Tobacco Use    Smoking status: Former     Packs/day: 0.50     Years: 3.00     Additional pack years: 0.00     Total pack years: 1.50     Types: Cigarettes     Start date:      Quit date: 12/15/2022     Years since quittin.8     Passive exposure: Past    Smokeless tobacco: Never  "   Tobacco comments:     going to quit   Vaping Use    Vaping Use: Never used   Substance and Sexual Activity    Alcohol use: Not Currently    Drug use: No    Sexual activity: Yes     Partners: Male         Family History   Problem Relation Age of Onset    Heart disease Mother     Heart attack Father     Stroke Father 79        on Coumadin    No Known Problems Brother     Breast cancer Neg Hx     Ovarian cancer Neg Hx     Colon cancer Neg Hx        Objective  Physical Exam:  /86 (BP Location: Right arm, Patient Position: Sitting)   Pulse 82   Temp 97.7 øF (36.5 øC) (Oral)   Resp 18   Ht 162.6 cm (64\")   Wt 71.8 kg (158 lb 3.2 oz)   SpO2 97%   BMI 27.15 kg/mý      Physical Exam  Vitals and nursing note reviewed.   Constitutional:       General: She is not in acute distress.     Appearance: Normal appearance. She is normal weight. She is not ill-appearing, toxic-appearing or diaphoretic.   HENT:      Head: Normocephalic and atraumatic.      Nose: Nose normal.   Eyes:      Extraocular Movements: Extraocular movements intact.   Cardiovascular:      Rate and Rhythm: Normal rate and regular rhythm.   Pulmonary:      Effort: Pulmonary effort is normal.   Abdominal:      General: Abdomen is flat.   Musculoskeletal:         General: Normal range of motion.      Cervical back: Normal range of motion.      Comments: Tenderness to left calf.  No skin changes.  2+ DP pulse on the left.   Skin:     General: Skin is warm and dry.   Neurological:      General: No focal deficit present.      Mental Status: She is alert. Mental status is at baseline.   Psychiatric:         Mood and Affect: Mood normal.         Behavior: Behavior normal.           Procedures    ED Course:         Lab Results (last 24 hours)       ** No results found for the last 24 hours. **             US Venous Doppler Lower Extremity Left (duplex)    Result Date: 10/13/2023  PROCEDURE: US VENOUS DOPPLER LOWER EXTREMITY LEFT (DUPLEX)-  HISTORY: calf " pain, concern for DVT  PROCEDURE: Multiple transverse and longitudinal scans were performed of the femoropopliteal deep venous system, with augmentation and compression maneuvers.  FINDINGS: Normal phasic flow was noted in the visualized deep venous system. No intraluminal increased echogenicity is noted to suggest thrombus. There is normal compression and augmentation of the venous structures. No abnormal venous collaterals are seen. No venous reflux is demonstrated.  There is a 5.4 cm complex fluid collection in the popliteal fossa consistent with a Baker's cyst.      Impression: No evidence of deep venous thrombosis.  Probable Baker's cyst.      Images were reviewed, interpreted, and dictated by Dr. Amira Peter MD Transcribed by Mary Echols PA-C.          Medical Decision Making      Tiffany Martinez is a 68 y.o. female who presents to the emergency department for evaluation of left calf pain    Differential diagnosis includes DVT, sprain, strain, musculoskeletal pain among other etiologies.    Venous Doppler ultrasound left lower extremity ordered for further evaluation of the patient's presentation.    Chart review if available included outside testing, previous visits, prior labs, prior imaging, available notes from prior evaluations or visits with specialists, medication list, allergies, past medical history, past surgical history when applicable.    Patient was treated with N/A    ultrasound consistent with Baker's cyst.  We will have her follow-up with orthopedics.    Plan for disposition is discharged home.  Patient/family comfortable with and understanding of the plan.      Final diagnoses:   Baker's cyst, left          Khari Weston PA-C  10/13/23 1009

## 2023-10-25 ENCOUNTER — OFFICE VISIT (OUTPATIENT)
Dept: INTERNAL MEDICINE | Facility: CLINIC | Age: 68
End: 2023-10-25
Payer: MEDICARE

## 2023-10-25 VITALS
HEART RATE: 68 BPM | TEMPERATURE: 96.4 F | BODY MASS INDEX: 27.31 KG/M2 | OXYGEN SATURATION: 95 % | HEIGHT: 64 IN | DIASTOLIC BLOOD PRESSURE: 84 MMHG | WEIGHT: 160 LBS | SYSTOLIC BLOOD PRESSURE: 140 MMHG | RESPIRATION RATE: 16 BRPM

## 2023-10-25 DIAGNOSIS — K12.1 STOMATITIS: Primary | ICD-10-CM

## 2023-10-25 PROCEDURE — 3077F SYST BP >= 140 MM HG: CPT | Performed by: INTERNAL MEDICINE

## 2023-10-25 PROCEDURE — 3079F DIAST BP 80-89 MM HG: CPT | Performed by: INTERNAL MEDICINE

## 2023-10-25 PROCEDURE — 99214 OFFICE O/P EST MOD 30 MIN: CPT | Performed by: INTERNAL MEDICINE

## 2023-10-25 NOTE — PROGRESS NOTES
Subjective     Patient ID: Tiffany Martinez is a 68 y.o. female. Patient is here for management of multiple medical problems.     Chief Complaint   Patient presents with    Thrush     History of Present Illness   Thrush.   Meds helped. Still on abx for RMSF and thrush       Htn      The following portions of the patient's history were reviewed and updated as appropriate: allergies, current medications, past family history, past medical history, past social history, past surgical history and problem list.    Review of Systems    Current Outpatient Medications:     amLODIPine (NORVASC) 5 MG tablet, Take 1 tablet by mouth Daily., Disp: , Rfl:     Diclofenac Sodium (VOLTAREN) 1 % gel gel, APPLY 2 GRAMS TO AFFECTED AREA 4 TIMES A DAY AS NEEDED, Disp: , Rfl:     ENBREL SURECLICK 50 MG/ML solution auto-injector, Inject 50 mg under the skin every 7 days., Disp: , Rfl:     Etanercept (Enbrel SureClick) 50 MG/ML solution auto-injector, Inject 1 mL under the skin into the appropriate area as directed Every 7 (Seven) Days., Disp: , Rfl:     folic acid (FOLVITE) 1 MG tablet, Take 5 tablets by mouth., Disp: , Rfl:     Linzess 145 MCG capsule capsule, Take 1 capsule by mouth Daily., Disp: , Rfl:     meloxicam (MOBIC) 15 MG tablet, Take 1 tablet by mouth Daily., Disp: , Rfl:     methotrexate 2.5 MG tablet, Take 1 tablet by mouth 1 (One) Time As Needed. 8 tabs weekly, Disp: , Rfl:     metoprolol succinate XL (TOPROL-XL) 25 MG 24 hr tablet, Take 1 tablet by mouth Daily., Disp: , Rfl:     nystatin (MYCOSTATIN) 100,000 unit/mL suspension, Swish and swallow 5 mL 4 (Four) Times a Day., Disp: 120 mL, Rfl: 1    omeprazole (priLOSEC) 20 MG capsule, Take 1 capsule by mouth Daily., Disp: , Rfl:     pantoprazole (PROTONIX) 40 MG EC tablet, Take 1 tablet by mouth Daily., Disp: , Rfl:     pilocarpine (SALAGEN) 5 MG tablet, Take 1 tablet by mouth Daily., Disp: , Rfl:     rivaroxaban (XARELTO) 20 MG tablet, Take 1 tablet by mouth Daily., Disp: ,  "Rfl:     rosuvastatin (CRESTOR) 5 MG tablet, Take 1 tablet by mouth Daily., Disp: , Rfl:     venlafaxine XR (Effexor XR) 37.5 MG 24 hr capsule, Take 1 capsule by mouth Daily., Disp: 90 capsule, Rfl: 3    Objective      Blood pressure 140/84, pulse 68, temperature 96.4 °F (35.8 °C), resp. rate 16, height 162.6 cm (64\"), weight 72.6 kg (160 lb), SpO2 95%, not currently breastfeeding.    Physical Exam     General Appearance:    Alert, cooperative, no distress, appears stated age   Head:    Normocephalic, without obvious abnormality, atraumatic   Eyes:    PERRL, conjunctiva/corneas clear, EOM's intact   Ears:    Normal TM's and external ear canals, both ears   Nose:   Nares normal, septum midline, mucosa normal, no drainage   or sinus tenderness   Throat:   Lips, mucosa, and tongue normal; teeth and gums normal   Neck:   Supple, symmetrical, trachea midline, no adenopathy;        thyroid:  No enlargement/tenderness/nodules; no carotid    bruit or JVD   Back:     Symmetric, no curvature, ROM normal, no CVA tenderness   Lungs:     Clear to auscultation bilaterally, respirations unlabored   Chest wall:    No tenderness or deformity   Heart:    Regular rate and rhythm, S1 and S2 normal, no murmur,        rub or gallop   Abdomen:     Soft, non-tender, bowel sounds active all four quadrants,     no masses, no organomegaly   Extremities:   Extremities normal, atraumatic, no cyanosis or edema   Pulses:   2+ and symmetric all extremities   Skin:   Skin color, texture, turgor normal, no rashes or lesions   Lymph nodes:   Cervical, supraclavicular, and axillary nodes normal   Neurologic:   CNII-XII intact. Normal strength, sensation and reflexes       throughout      Results for orders placed or performed in visit on 07/21/23   Lyme Disease, PCR - , Arm, Right    Specimen: Arm, Right   Result Value Ref Range    Lyme Disease(B.burgdorferi)PCR Negative Negative   Lipid Panel    Specimen: Blood   Result Value Ref Range    Total " Cholesterol 170 100 - 199 mg/dL    Triglycerides 134 0 - 149 mg/dL    HDL Cholesterol 53 >39 mg/dL    VLDL Cholesterol Cesar 24 5 - 40 mg/dL    LDL Chol Calc (NIH) 93 0 - 99 mg/dL   Vitamin B12    Specimen: Blood   Result Value Ref Range    Vitamin B-12 513 232 - 1,245 pg/mL   Comprehensive Metabolic Panel    Specimen: Blood   Result Value Ref Range    Glucose 99 70 - 99 mg/dL    BUN 16 8 - 27 mg/dL    Creatinine 0.86 0.57 - 1.00 mg/dL    EGFR Result 74 >59 mL/min/1.73    BUN/Creatinine Ratio 19 12 - 28    Sodium 139 134 - 144 mmol/L    Potassium 4.1 3.5 - 5.2 mmol/L    Chloride 100 96 - 106 mmol/L    Total CO2 21 20 - 29 mmol/L    Calcium 9.0 8.7 - 10.3 mg/dL    Total Protein 7.4 6.0 - 8.5 g/dL    Albumin 4.4 3.9 - 4.9 g/dL    Globulin 3.0 1.5 - 4.5 g/dL    A/G Ratio 1.5 1.2 - 2.2    Total Bilirubin 0.3 0.0 - 1.2 mg/dL    Alkaline Phosphatase 90 44 - 121 IU/L    AST (SGOT) 27 0 - 40 IU/L    ALT (SGPT) 20 0 - 32 IU/L   TSH    Specimen: Blood   Result Value Ref Range    TSH 4.960 (H) 0.450 - 4.500 uIU/mL   T4, Free    Specimen: Blood   Result Value Ref Range    Free T4 1.16 0.82 - 1.77 ng/dL   Hemoglobin A1c    Specimen: Blood   Result Value Ref Range    Hemoglobin A1C 5.7 (H) 4.8 - 5.6 %   [place] Spotted Fever, IgM    Specimen: Blood   Result Value Ref Range    RMSF IgM 2.55 (H) 0.00 - 0.89 index   [facility] Spotted Fever, IgG    Specimen: Blood   Result Value Ref Range    RMSF IgG Negative Negative   Ehrlichia Antibody Panel    Specimen: Blood   Result Value Ref Range    E. chaffeensis (HME) IgG Titer Negative Neg:<1:64    E. chaffeensis (HME) IgM Titer Negative Neg:<1:20    HGE IgG Titer Negative Neg:<1:64    HGE IgM Titer Negative Neg:<1:20    RESULT COMMENT: Comment    Cyclic Citrul Peptide Antibody, IgG / IgA    Specimen: Blood   Result Value Ref Range    CCP Antibodies IgG/IgA >250 (H) 0 - 19 units   Rheumatoid Factor    Specimen: Blood   Result Value Ref Range    RA Latex Turbid 480.2 (H) <14.0 IU/mL    Sedimentation Rate    Specimen: Blood   Result Value Ref Range    Sed Rate 45 (H) 0 - 40 mm/hr   Uric Acid    Specimen: Blood   Result Value Ref Range    Uric Acid 4.6 3.0 - 7.2 mg/dL   C-reactive Protein    Specimen: Blood   Result Value Ref Range    C-Reactive Protein 6 0 - 10 mg/L   Antistreptolysin O Titer    Specimen: Blood   Result Value Ref Range    ASO 23.5 0.0 - 200.0 IU/mL   CK    Specimen: Blood   Result Value Ref Range    Creatine Kinase 58 32 - 182 U/L   Myoglobin, Serum    Specimen: Blood   Result Value Ref Range    Myoglobin 26 25 - 58 ng/mL   CBC & Differential    Specimen: Blood   Result Value Ref Range    WBC 5.4 3.4 - 10.8 x10E3/uL    RBC 4.46 3.77 - 5.28 x10E6/uL    Hemoglobin 13.8 11.1 - 15.9 g/dL    Hematocrit 41.5 34.0 - 46.6 %    MCV 93 79 - 97 fL    MCH 30.9 26.6 - 33.0 pg    MCHC 33.3 31.5 - 35.7 g/dL    RDW 13.2 11.7 - 15.4 %    Platelets 219 150 - 450 x10E3/uL    Neutrophil Rel % 66 Not Estab. %    Lymphocyte Rel % 23 Not Estab. %    Monocyte Rel % 6 Not Estab. %    Eosinophil Rel % 5 Not Estab. %    Basophil Rel % 0 Not Estab. %    Neutrophils Absolute 3.5 1.4 - 7.0 x10E3/uL    Lymphocytes Absolute 1.2 0.7 - 3.1 x10E3/uL    Monocytes Absolute 0.3 0.1 - 0.9 x10E3/uL    Eosinophils Absolute 0.3 0.0 - 0.4 x10E3/uL    Basophils Absolute 0.0 0.0 - 0.2 x10E3/uL    Immature Granulocyte Rel % 0 Not Estab. %    Immature Grans Absolute 0.0 0.0 - 0.1 x10E3/uL         Assessment & Plan   Stomatitis likely from combination of enbrel abx and mtx, steroid inj.   Will get fresh labs.     Bakers cyst. Has apt with Dr James gallegos.        Diagnoses and all orders for this visit:    1. Stomatitis (Primary)  -     CBC & Differential  -     Comprehensive Metabolic Panel  -     Vitamin B12  -     Vitamin B6    Other orders  -     nystatin (MYCOSTATIN) 100,000 unit/mL suspension; Swish and swallow 5 mL 4 (Four) Times a Day.  Dispense: 120 mL; Refill: 1      No follow-ups on file.          There are no Patient  Instructions on file for this visit.     Santo Pinon MD    Assessment & Plan       Answers submitted by the patient for this visit:  Other (Submitted on 10/18/2023)  Please describe your symptoms.: Annual  Have you had these symptoms before?: No  How long have you been having these symptoms?: Greater than 2 weeks  Primary Reason for Visit (Submitted on 10/18/2023)  What is the primary reason for your visit?: Other

## 2023-10-27 ENCOUNTER — OFFICE VISIT (OUTPATIENT)
Dept: UROLOGY | Facility: CLINIC | Age: 68
End: 2023-10-27
Payer: MEDICARE

## 2023-10-27 VITALS
HEART RATE: 60 BPM | TEMPERATURE: 98.1 F | DIASTOLIC BLOOD PRESSURE: 80 MMHG | OXYGEN SATURATION: 99 % | SYSTOLIC BLOOD PRESSURE: 142 MMHG | WEIGHT: 160 LBS | HEIGHT: 64 IN | BODY MASS INDEX: 27.31 KG/M2

## 2023-10-27 DIAGNOSIS — N39.41 URGE INCONTINENCE OF URINE: Primary | ICD-10-CM

## 2023-10-27 LAB
BILIRUB BLD-MCNC: NEGATIVE MG/DL
CLARITY, POC: CLEAR
COLOR UR: YELLOW
EXPIRATION DATE: ABNORMAL
GLUCOSE UR STRIP-MCNC: NEGATIVE MG/DL
KETONES UR QL: ABNORMAL
LEUKOCYTE EST, POC: ABNORMAL
Lab: ABNORMAL
NITRITE UR-MCNC: NEGATIVE MG/ML
PH UR: 6 [PH] (ref 5–8)
PROT UR STRIP-MCNC: ABNORMAL MG/DL
RBC # UR STRIP: NEGATIVE /UL
SP GR UR: 1.03 (ref 1–1.03)
UROBILINOGEN UR QL: NORMAL

## 2023-10-27 RX ORDER — FESOTERODINE FUMARATE 4 MG/1
4 TABLET, EXTENDED RELEASE ORAL
Qty: 90 EACH | Refills: 3 | Status: SHIPPED | OUTPATIENT
Start: 2023-10-27 | End: 2023-10-30

## 2023-10-27 NOTE — PROGRESS NOTES
Chief Complaint  Chief Complaint   Patient presents with    Follow-up    Urinary Incontinence          HPI  Ms. Martinez is a 68 y.o. female presents with complaint of urinary incontinence for worsening over the last few months    Has not previously seen urology.    Pt has primarily urge urinary incontinence.     Severity: Pt uses 1 pads a day and has tried limiting fluids and kegel exercises in the past  Associated Sx: Pt has h/o daytime frequency, urgency and nocturia     No h/o poor stream, straining, hesitancy or incomplete voiding     No h/o recurrent UTI, nephrolithiasis                H/o hematuria negative work up with Dr. Willis    No vaginal discharge or bleeding  No sensation of POP  No        Fecal incontinence  No Post-void dribbling  No Dyspareunia   No Constipation  0 Vaginal deliveries    Past Medical History  Past Medical History:   Diagnosis Date    Acid reflux     Arrhythmia     Colon polyp 2015    Diverticulosis 2015    Hemorrhoids, internal 2015    Hyperlipidemia     Hypertension     Rheumatoid arthritis     Shingles 2020    Left abd/flank/back       Past Surgical History  Past Surgical History:   Procedure Laterality Date     SECTION      times 2    COLONOSCOPY W/ POLYPECTOMY  2021    ENDOSCOPY  2021    [facility]     HYSTEROSCOPY ENDOMETRIAL ABLATION  2016    D&C polypectomy [facility]    TUBAL ABDOMINAL LIGATION Bilateral        Medications  Current Outpatient Medications   Medication Sig Dispense Refill    amLODIPine (NORVASC) 5 MG tablet Take 1 tablet by mouth Daily.      Diclofenac Sodium (VOLTAREN) 1 % gel gel APPLY 2 GRAMS TO AFFECTED AREA 4 TIMES A DAY AS NEEDED      ENBREL SURECLICK 50 MG/ML solution auto-injector Inject 50 mg under the skin every 7 days.      Etanercept (Enbrel SureClick) 50 MG/ML solution auto-injector Inject 1 mL under the skin into the appropriate area as directed Every 7 (Seven) Days.      folic acid (FOLVITE) 1 MG tablet Take 5  tablets by mouth.      Linzess 145 MCG capsule capsule Take 1 capsule by mouth Daily.      meloxicam (MOBIC) 15 MG tablet Take 1 tablet by mouth Daily.      methotrexate 2.5 MG tablet Take 1 tablet by mouth 1 (One) Time As Needed. 8 tabs weekly      metoprolol succinate XL (TOPROL-XL) 25 MG 24 hr tablet Take 1 tablet by mouth Daily.      nystatin (MYCOSTATIN) 100,000 unit/mL suspension Swish and swallow 5 mL 4 (Four) Times a Day. 120 mL 1    omeprazole (priLOSEC) 20 MG capsule Take 1 capsule by mouth Daily.      pantoprazole (PROTONIX) 40 MG EC tablet Take 1 tablet by mouth Daily.      pilocarpine (SALAGEN) 5 MG tablet Take 1 tablet by mouth Daily.      rosuvastatin (CRESTOR) 5 MG tablet Take 1 tablet by mouth Daily.      venlafaxine XR (Effexor XR) 37.5 MG 24 hr capsule Take 1 capsule by mouth Daily. 90 capsule 3    fesoterodine fumarate (Toviaz) 4 MG tablet sustained-release 24 hour tablet Take 1 tablet by mouth Daily. 90 each 3    rivaroxaban (XARELTO) 20 MG tablet Take 1 tablet by mouth Daily. (Patient not taking: Reported on 10/27/2023)       No current facility-administered medications for this visit.       Allergies  Allergies   Allergen Reactions    Codeine Nausea And Vomiting and Hives       Social History  Social History     Socioeconomic History    Marital status:    Tobacco Use    Smoking status: Former     Packs/day: 0.50     Years: 3.00     Additional pack years: 0.00     Total pack years: 1.50     Types: Cigarettes     Start date:      Quit date: 12/15/2022     Years since quittin.8     Passive exposure: Past    Smokeless tobacco: Never    Tobacco comments:     going to quit   Vaping Use    Vaping Use: Never used   Substance and Sexual Activity    Alcohol use: Not Currently    Drug use: No    Sexual activity: Yes     Partners: Male       Family History  Family History   Problem Relation Age of Onset    Heart disease Mother     Heart attack Father     Stroke Father 79        on Coumadin  "   No Known Problems Brother     Breast cancer Neg Hx     Ovarian cancer Neg Hx     Colon cancer Neg Hx          Physical Exam  Visit Vitals  /80   Pulse 60   Temp 98.1 °F (36.7 °C)   Ht 162.6 cm (64.02\")   Wt 72.6 kg (160 lb)   SpO2 99%   BMI 27.45 kg/m²     Physical exam was notable for non tender bladder.    Labs  Brief Urine Lab Results  (Last result in the past 365 days)        Color   Clarity   Blood   Leuk Est   Nitrite   Protein   CREAT   Urine HCG        05/22/23 1527 Dark Yellow   Cloudy   3+   Small (1+)   Negative   2+                   Lab Results   Component Value Date    GLUCOSE 99 07/21/2023    CALCIUM 9.0 07/21/2023     07/21/2023    K 4.1 07/21/2023    CO2 21 07/21/2023     07/21/2023    BUN 16 07/21/2023    CREATININE 0.86 07/21/2023    BCR 19 07/21/2023    ANIONGAP 12.9 10/08/2022       Lab Results   Component Value Date    WBC 5.4 07/21/2023    HGB 13.8 07/21/2023    HCT 41.5 07/21/2023    MCV 93 07/21/2023     07/21/2023       PVR  Post-void residual performed by staff - 0ml    I have personally reviewed her labs and post void residual imaging.     Assessment  Ms. Martinez is a 68 y.o. female with urge urinary incontinence.    We discussed the AUA guidelines for urge urinary incontinence.  We discussed lifestyle changes such as weight reduction and caffeine reduction, as well as medications such as anticholinergics and beta agonist.  We discussed third line therapies, such as Botox and InterStim. The patient has elected anticholinergic trail.  We discussed the risks, benefits, and alternatives to this approach.  She voiced her understanding and wished to proceed.    Plan  1. Toviaz 4mg daily      AMBIKA Diane, NP-C  [facility] [place]     "

## 2023-10-30 DIAGNOSIS — N39.41 URGE INCONTINENCE OF URINE: Primary | ICD-10-CM

## 2023-10-30 RX ORDER — OXYBUTYNIN CHLORIDE 10 MG/1
10 TABLET, EXTENDED RELEASE ORAL DAILY
Qty: 30 TABLET | Refills: 11 | Status: SHIPPED | OUTPATIENT
Start: 2023-10-30

## 2023-11-03 LAB
ALBUMIN SERPL-MCNC: 4.1 G/DL (ref 3.5–5.2)
ALBUMIN/GLOB SERPL: 1.6 G/DL
ALP SERPL-CCNC: 86 U/L (ref 39–117)
ALT SERPL-CCNC: 15 U/L (ref 1–33)
AST SERPL-CCNC: 17 U/L (ref 1–32)
BASOPHILS # BLD AUTO: 0.03 10*3/MM3 (ref 0–0.2)
BASOPHILS NFR BLD AUTO: 0.5 % (ref 0–1.5)
BILIRUB SERPL-MCNC: 0.2 MG/DL (ref 0–1.2)
BUN SERPL-MCNC: 14 MG/DL (ref 8–23)
BUN/CREAT SERPL: 18.2 (ref 7–25)
CALCIUM SERPL-MCNC: 9 MG/DL (ref 8.6–10.5)
CHLORIDE SERPL-SCNC: 102 MMOL/L (ref 98–107)
CO2 SERPL-SCNC: 24 MMOL/L (ref 22–29)
CREAT SERPL-MCNC: 0.77 MG/DL (ref 0.57–1)
EGFRCR SERPLBLD CKD-EPI 2021: 84.7 ML/MIN/1.73
EOSINOPHIL # BLD AUTO: 0.22 10*3/MM3 (ref 0–0.4)
EOSINOPHIL NFR BLD AUTO: 3.4 % (ref 0.3–6.2)
ERYTHROCYTE [DISTWIDTH] IN BLOOD BY AUTOMATED COUNT: 13.5 % (ref 12.3–15.4)
GLOBULIN SER CALC-MCNC: 2.6 GM/DL
GLUCOSE SERPL-MCNC: 106 MG/DL (ref 65–99)
HCT VFR BLD AUTO: 39.7 % (ref 34–46.6)
HGB BLD-MCNC: 13.6 G/DL (ref 12–15.9)
IMM GRANULOCYTES # BLD AUTO: 0.05 10*3/MM3 (ref 0–0.05)
IMM GRANULOCYTES NFR BLD AUTO: 0.8 % (ref 0–0.5)
LYMPHOCYTES # BLD AUTO: 1.42 10*3/MM3 (ref 0.7–3.1)
LYMPHOCYTES NFR BLD AUTO: 22.2 % (ref 19.6–45.3)
MCH RBC QN AUTO: 31.6 PG (ref 26.6–33)
MCHC RBC AUTO-ENTMCNC: 34.3 G/DL (ref 31.5–35.7)
MCV RBC AUTO: 92.3 FL (ref 79–97)
MONOCYTES # BLD AUTO: 0.51 10*3/MM3 (ref 0.1–0.9)
MONOCYTES NFR BLD AUTO: 8 % (ref 5–12)
NEUTROPHILS # BLD AUTO: 4.18 10*3/MM3 (ref 1.7–7)
NEUTROPHILS NFR BLD AUTO: 65.1 % (ref 42.7–76)
NRBC BLD AUTO-RTO: 0 /100 WBC (ref 0–0.2)
PLATELET # BLD AUTO: 259 10*3/MM3 (ref 140–450)
POTASSIUM SERPL-SCNC: 4.3 MMOL/L (ref 3.5–5.2)
PROT SERPL-MCNC: 6.7 G/DL (ref 6–8.5)
PYRIDOXAL PHOS SERPL-MCNC: 5.4 UG/L (ref 3.4–65.2)
RBC # BLD AUTO: 4.3 10*6/MM3 (ref 3.77–5.28)
SODIUM SERPL-SCNC: 137 MMOL/L (ref 136–145)
VIT B12 SERPL-MCNC: 395 PG/ML (ref 211–946)
WBC # BLD AUTO: 6.41 10*3/MM3 (ref 3.4–10.8)

## 2024-01-25 ENCOUNTER — OFFICE VISIT (OUTPATIENT)
Dept: INTERNAL MEDICINE | Facility: CLINIC | Age: 69
End: 2024-01-25
Payer: MEDICARE

## 2024-01-25 VITALS
WEIGHT: 157 LBS | RESPIRATION RATE: 16 BRPM | SYSTOLIC BLOOD PRESSURE: 144 MMHG | DIASTOLIC BLOOD PRESSURE: 88 MMHG | OXYGEN SATURATION: 96 % | TEMPERATURE: 97.4 F | HEART RATE: 70 BPM | HEIGHT: 64 IN | BODY MASS INDEX: 26.8 KG/M2

## 2024-01-25 DIAGNOSIS — R09.89 THROAT CLEARING: Primary | ICD-10-CM

## 2024-01-25 DIAGNOSIS — Z00.00 ROUTINE GENERAL MEDICAL EXAMINATION AT A HEALTH CARE FACILITY: ICD-10-CM

## 2024-01-25 DIAGNOSIS — L98.9 SKIN LESION: ICD-10-CM

## 2024-01-25 DIAGNOSIS — R68.2 DRY MOUTH: ICD-10-CM

## 2024-01-25 NOTE — PROGRESS NOTES
Subjective     Patient ID: Tiffany Martinez is a 68 y.o. female. Patient is here for management of multiple medical problems.     Chief Complaint   Patient presents with    Medic[employer] Wellness-subsequent   Swollen gland and dry mouth.    History of Present Illness   Swollen gland and dry mouth    Glob in back of through.     The following portions of the patient's history were reviewed and updated as appropriate: allergies, current medications, past family history, past medical history, past social history, past surgical history and problem list.    Review of Systems    Current Outpatient Medications:     amLODIPine (NORVASC) 5 MG tablet, Take 1 tablet by mouth Daily., Disp: , Rfl:     Diclofenac Sodium (VOLTAREN) 1 % gel gel, APPLY 2 GRAMS TO AFFECTED AREA 4 TIMES A DAY AS NEEDED, Disp: , Rfl:     ENBREL SURECLICK 50 MG/ML solution auto-injector, Inject 50 mg under the skin every 7 days., Disp: , Rfl:     folic acid (FOLVITE) 1 MG tablet, Take 5 tablets by mouth., Disp: , Rfl:     Linzess 145 MCG capsule capsule, Take 1 capsule by mouth Daily., Disp: , Rfl:     meloxicam (MOBIC) 15 MG tablet, Take 1 tablet by mouth Daily., Disp: , Rfl:     methotrexate 2.5 MG tablet, Take 1 tablet by mouth 1 (One) Time As Needed. 8 tabs weekly, Disp: , Rfl:     metoprolol succinate XL (TOPROL-XL) 25 MG 24 hr tablet, Take 1 tablet by mouth Daily., Disp: , Rfl:     nystatin (MYCOSTATIN) 100,000 unit/mL suspension, Swish and swallow 5 mL 4 (Four) Times a Day., Disp: 120 mL, Rfl: 1    omeprazole (priLOSEC) 20 MG capsule, Take 1 capsule by mouth Daily., Disp: , Rfl:     pantoprazole (PROTONIX) 40 MG EC tablet, Take 1 tablet by mouth Daily., Disp: , Rfl:     pilocarpine (SALAGEN) 5 MG tablet, Take 1 tablet by mouth Daily., Disp: , Rfl:     rosuvastatin (CRESTOR) 5 MG tablet, Take 1 tablet by mouth Daily., Disp: , Rfl:     venlafaxine XR (Effexor XR) 37.5 MG 24 hr capsule, Take 1 capsule by mouth Daily., Disp: 90 capsule, Rfl: 3    Objective  "     Blood pressure 144/88, pulse 70, temperature 97.4 °F (36.3 °C), resp. rate 16, height 162.6 cm (64.02\"), weight 71.2 kg (157 lb), SpO2 96%, not currently breastfeeding.            Physical Exam     General Appearance:    Alert, cooperative, no distress, appears stated age   Head:    Normocephalic, without obvious abnormality, atraumatic   Eyes:    PERRL, conjunctiva/corneas clear, EOM's intact   Ears:    Normal TM's and external ear canals, both ears   Nose:   Nares normal, septum midline, mucosa normal, no drainage   or sinus tenderness   Throat:   Lips, mucosa, and tongue normal; teeth and gums normal   Neck:   Supple, symmetrical, trachea midline, no adenopathy;        thyroid:  No enlargement/tenderness/nodules; no carotid    bruit or JVD   Back:     Symmetric, no curvature, ROM normal, no CVA tenderness   Lungs:     Clear to auscultation bilaterally, respirations unlabored   Chest wall:    No tenderness or deformity   Heart:    Regular rate and rhythm, S1 and S2 normal, no murmur,        rub or gallop   Abdomen:     Soft, non-tender, bowel sounds active all four quadrants,     no masses, no organomegaly   Extremities:   Extremities normal, atraumatic, no cyanosis or edema   Pulses:   2+ and symmetric all extremities   Skin:   Skin color, texture, turgor normal, no rashes or lesions   Lymph nodes:   Cervical, supraclavicular, and axillary nodes normal   Neurologic:   CNII-XII intact. Normal strength, sensation and reflexes       throughout      Results for orders placed or performed in visit on 10/27/23   POC Urinalysis Dipstick, Automated    Specimen: Urine   Result Value Ref Range    Color Yellow Yellow, Straw, Dark Yellow, Mounika    Clarity, UA Clear Clear    Specific Gravity  1.030 1.005 - 1.030    pH, Urine 6.0 5.0 - 8.0    Leukocytes Trace (A) Negative    Nitrite, UA Negative Negative    Protein, POC 1+ (A) Negative mg/dL    Glucose, UA Negative Negative mg/dL    Ketones, UA Trace (A) Negative    " Urobilinogen, UA Normal Normal, 0.2 E.U./dL    Bilirubin Negative Negative    Blood, UA Negative Negative    Lot Number [id number]     Expiration Date 1/14/2025          Assessment & Plan   Skin lesin on ear not getting better. Pt would like to try a new Dermotologist.      ENT to eval throat.  dry mouth issues addressed with Dr Wong.  On Pilocarpine.  Venlafaxine may be worseing the dry mouth. Will stop and see if better.  Takes for hot flashes.          Not working well.        Diagnoses and all orders for this visit:    1. Throat clearing (Primary)  -     Ambulatory Referral to ENT (Otolaryngology)    2. Dry mouth  -     Ambulatory Referral to ENT (Otolaryngology)    3. Skin lesion  -     Ambulatory Referral to Dermatology      Return in about 6 months (around 7/25/2024).          There are no Patient Instructions on file for this visit.     Santo Pinon MD    Assessment & Plan

## 2024-01-25 NOTE — PROGRESS NOTES
The ABCs of the Annual Wellness Visit  Subsequent Medicare Wellness Visit    Subjective      Tiffany Martinez is a 68 y.o. female who presents for a Subsequent Medicare Wellness Visit.    The following portions of the patient's history were reviewed and   updated as appropriate: allergies, current medications, past family history, past medical history, past social history, past surgical history, and problem list.    Compared to one year ago, the patient feels her physical   health is worse.    Compared to one year ago, the patient feels her mental   health is the same.    Recent Hospitalizations:  She was not admitted to the hospital during the last year.       Current Medical Providers:  Patient Care Team:  Santo Pinon MD as PCP - General (Internal Medicine)  Fer Cruz MD as Gynecologist (Gynecology)  Efrain Burt MD as Consulting Physician (Internal Medicine)    Outpatient Medications Prior to Visit   Medication Sig Dispense Refill    amLODIPine (NORVASC) 5 MG tablet Take 1 tablet by mouth Daily.      Diclofenac Sodium (VOLTAREN) 1 % gel gel APPLY 2 GRAMS TO AFFECTED AREA 4 TIMES A DAY AS NEEDED      ENBREL SURECLICK 50 MG/ML solution auto-injector Inject 50 mg under the skin every 7 days.      folic acid (FOLVITE) 1 MG tablet Take 5 tablets by mouth.      Linzess 145 MCG capsule capsule Take 1 capsule by mouth Daily.      meloxicam (MOBIC) 15 MG tablet Take 1 tablet by mouth Daily.      methotrexate 2.5 MG tablet Take 1 tablet by mouth 1 (One) Time As Needed. 8 tabs weekly      metoprolol succinate XL (TOPROL-XL) 25 MG 24 hr tablet Take 1 tablet by mouth Daily.      nystatin (MYCOSTATIN) 100,000 unit/mL suspension Swish and swallow 5 mL 4 (Four) Times a Day. 120 mL 1    omeprazole (priLOSEC) 20 MG capsule Take 1 capsule by mouth Daily.      pantoprazole (PROTONIX) 40 MG EC tablet Take 1 tablet by mouth Daily.      pilocarpine (SALAGEN) 5 MG tablet Take 1 tablet by mouth Daily.      rosuvastatin  (CRESTOR) 5 MG tablet Take 1 tablet by mouth Daily.      venlafaxine XR (Effexor XR) 37.5 MG 24 hr capsule Take 1 capsule by mouth Daily. 90 capsule 3    Etanercept (Enbrel SureClick) 50 MG/ML solution auto-injector Inject 1 mL under the skin into the appropriate area as directed Every 7 (Seven) Days.      oxybutynin XL (Ditropan XL) 10 MG 24 hr tablet Take 1 tablet by mouth Daily. 30 tablet 11    rivaroxaban (XARELTO) 20 MG tablet Take 1 tablet by mouth Daily. (Patient not taking: Reported on 10/27/2023)      RSVPreF3 Vac Recomb Adjuvanted (Arexvy) 120 MCG/0.5ML reconstituted suspension injection Inject into the appropriate muscle as directed per protocol 0.5 mL 0     No facility-administered medications prior to visit.       No opioid medication identified on active medication list. I have reviewed chart for other potential  high risk medication/s and harmful drug interactions in the elderly.        Aspirin is not on active medication list.  Aspirin use is not indicated based on review of current medical condition/s. Risk of harm outweighs potential benefits.  .    Patient Active Problem List   Diagnosis    EMILIA (stress urinary incontinence, female)    Depression    Rheumatoid arthritis    Colon polyp/diverticulosis 2015    Osteopenia left femoral neck/spine December 2019    Right ovarian cyst    Acid reflux    Hypertension    Hyperlipidemia    Hemorrhoids, internal    Diverticulosis    Shingles    Arrhythmia    Pain in left knee    Allergic rhinitis    Dysphagia    Gastro-esophageal reflux disease with esophagitis    Glossodynia    Hypothyroidism    Idiopathic osteoarthritis    Osteochondritis dissecans    Tear of medial meniscus of knee    Xerostomia    Hyperlipidemia    Essential hypertension    Rheumatoid arthritis    Sjogren's syndrome    Herpes zoster    Hot flashes    DVT femoral (deep venous thrombosis) with thrombophlebitis, left    Abnormal finding of blood chemistry, unspecified    BLAIR (obstructive sleep  "apnea)    Candidiasis    Disorder of upper respiratory system    Knee joint effusion    Allergic rhinitis    Dysphagia    Hypertensive disorder    Gastro-esophageal reflux disease with esophagitis    Glossodynia    Hyperlipidemia    Idiopathic osteoarthritis    Osteochondritis dissecans    Rheumatoid arthritis of knee    Sjogren's syndrome    Tear of medial meniscus of knee    Disturbance of salivary secretion    Xerostomia     Advance Care Planning   Advance Care Planning     Advance Directive is not on file.  ACP discussion was held with the patient during this visit. Patient does not have an advance directive, declines further assistance.     Objective    Vitals:    24 0944   BP: 144/88   Pulse: 70   Resp: 16   Temp: 97.4 °F (36.3 °C)   SpO2: 96%   Weight: 71.2 kg (157 lb)   Height: 162.6 cm (64.02\")   PainSc: 0-No pain     Estimated body mass index is 26.93 kg/m² as calculated from the following:    Height as of this encounter: 162.6 cm (64.02\").    Weight as of this encounter: 71.2 kg (157 lb).           Does the patient have evidence of cognitive impairment?   No            HEALTH RISK ASSESSMENT    Smoking Status:  Social History     Tobacco Use   Smoking Status Former    Packs/day: 0.50    Years: 3.00    Additional pack years: 0.00    Total pack years: 1.50    Types: Cigarettes    Start date:     Quit date: 12/15/2022    Years since quittin.1    Passive exposure: Past   Smokeless Tobacco Never   Tobacco Comments    going to quit     Alcohol Consumption:  Social History     Substance and Sexual Activity   Alcohol Use Not Currently     Fall Risk Screen:    JP Fall Risk Assessment was completed, and patient is at LOW risk for falls.Assessment completed on:2024    Depression Screenin/25/2024     9:49 AM   PHQ-2/PHQ-9 Depression Screening   Little Interest or Pleasure in Doing Things 0-->not at all   Feeling Down, Depressed or Hopeless 0-->not at all   PHQ-9: Brief Depression " Severity Measure Score 0       Health Habits and Functional and Cognitive Screenin/25/2024     9:49 AM   Functional & Cognitive Status   Do you have difficulty preparing food and eating? No   Do you have difficulty bathing yourself, getting dressed or grooming yourself? Yes   Do you have difficulty using the toilet? No   Do you have difficulty moving around from place to place? No   Do you have trouble with steps or getting out of a bed or a chair? Yes   Current Diet Well Balanced Diet   Dental Exam Up to date   Eye Exam Up to date   Exercise (times per week) 0 times per week   Current Exercises Include No Regular Exercise   Do you need help using the phone?  No   Are you deaf or do you have serious difficulty hearing?  Yes   Do you need help to go to places out of walking distance? No   Do you need help shopping? No   Do you need help preparing meals?  No   Do you need help with housework?  No   Do you need help with laundry? No   Do you need help taking your medications? No   Do you need help managing money? No   Do you ever drive or ride in a car without wearing a seat belt? No   Have you felt unusual stress, anger or loneliness in the last month? No   Who do you live with? Spouse   If you need help, do you have trouble finding someone available to you? No   Have you been bothered in the last four weeks by sexual problems? No   Do you have difficulty concentrating, remembering or making decisions? No       Age-appropriate Screening Schedule:  Refer to the list below for future screening recommendations based on patient's age, sex and/or medical conditions. Orders for these recommended tests are listed in the plan section. The patient has been provided with a written plan.    Health Maintenance   Topic Date Due    HEPATITIS C SCREENING  Never done    ANNUAL WELLNESS VISIT  Never done    DXA SCAN  2021    Pneumococcal Vaccine 65+ (3 of 3 - PPSV23 or PCV20) 2022    TDAP/TD VACCINES (2 - Td or  Tdap) 08/13/2022    PAP SMEAR  01/06/2023    MAMMOGRAM  06/15/2023    COVID-19 Vaccine (6 - 2023-24 season) 04/15/2024 (Originally 9/1/2023)    LIPID PANEL  07/21/2024    BMI FOLLOWUP  07/21/2024    COLORECTAL CANCER SCREENING  03/09/2025    INFLUENZA VACCINE  Completed    ZOSTER VACCINE  Completed                  CMS Preventative Services Quick Reference  Risk Factors Identified During Encounter:    Fall Risk-High or Moderate: Discussed Fall Prevention in the home and Information on Fall Prevention Shared in After Visit Summary    The above risks/problems have been discussed with the patient.  Pertinent information has been shared with the patient in the After Visit Summary.    Diagnoses and all orders for this visit:    1. Throat clearing (Primary)  -     Ambulatory Referral to ENT (Otolaryngology)    2. Dry mouth  -     Ambulatory Referral to ENT (Otolaryngology)    3. Skin lesion  -     Ambulatory Referral to Dermatology    4. Routine general medical examination at a health care facility        Follow Up:   Next Medicare Wellness visit to be scheduled in 1 year.      An After Visit Summary and PPPS were made available to the patient.

## 2024-05-02 ENCOUNTER — OFFICE VISIT (OUTPATIENT)
Dept: INTERNAL MEDICINE | Facility: CLINIC | Age: 69
End: 2024-05-02
Payer: MEDICARE

## 2024-05-02 VITALS
HEIGHT: 64 IN | SYSTOLIC BLOOD PRESSURE: 140 MMHG | TEMPERATURE: 97.7 F | WEIGHT: 156 LBS | BODY MASS INDEX: 26.63 KG/M2 | HEART RATE: 59 BPM | DIASTOLIC BLOOD PRESSURE: 78 MMHG | OXYGEN SATURATION: 98 %

## 2024-05-02 DIAGNOSIS — M79.671 RIGHT FOOT PAIN: Primary | ICD-10-CM

## 2024-05-02 DIAGNOSIS — E53.8 VITAMIN B12 DEFICIENCY: ICD-10-CM

## 2024-05-02 DIAGNOSIS — Z12.31 ENCOUNTER FOR SCREENING MAMMOGRAM FOR MALIGNANT NEOPLASM OF BREAST: ICD-10-CM

## 2024-05-02 DIAGNOSIS — E53.1 VITAMIN B6 DEFICIENCY: ICD-10-CM

## 2024-05-02 PROCEDURE — 3077F SYST BP >= 140 MM HG: CPT | Performed by: INTERNAL MEDICINE

## 2024-05-02 PROCEDURE — G2211 COMPLEX E/M VISIT ADD ON: HCPCS | Performed by: INTERNAL MEDICINE

## 2024-05-02 PROCEDURE — 99214 OFFICE O/P EST MOD 30 MIN: CPT | Performed by: INTERNAL MEDICINE

## 2024-05-02 PROCEDURE — 3078F DIAST BP <80 MM HG: CPT | Performed by: INTERNAL MEDICINE

## 2024-05-02 RX ORDER — VENLAFAXINE HYDROCHLORIDE 37.5 MG/1
37.5 CAPSULE, EXTENDED RELEASE ORAL DAILY
Qty: 90 CAPSULE | Refills: 3 | Status: SHIPPED | OUTPATIENT
Start: 2024-05-02

## 2024-05-02 NOTE — TELEPHONE ENCOUNTER
Rx Refill Note  Requested Prescriptions     Pending Prescriptions Disp Refills    venlafaxine XR (EFFEXOR-XR) 37.5 MG 24 hr capsule [Pharmacy Med Name: VENLAFAXINE ER 37.5MG CAPSULES] 90 capsule 3     Sig: Take 1 capsule by mouth Daily.      Last office visit with prescribing clinician: 1/25/2024   Last telemedicine visit with prescribing clinician: Visit date not found   Next office visit with prescribing clinician: 5/2/2024       Alicia Granda MA  05/02/24, 10:08 EDT

## 2024-05-02 NOTE — PROGRESS NOTES
Subjective     Patient ID: Tiffany Martinez is a 68 y.o. female. Patient is here for management of multiple medical problems.     Chief Complaint   Patient presents with    Foot Swelling     Rt foot      History of Present Illness   Right foot pain and swelling. Contious pain.   No trauma.       The following portions of the patient's history were reviewed and updated as appropriate: allergies, current medications, past family history, past medical history, past social history, past surgical history and problem list.    Review of Systems    Current Outpatient Medications:     amLODIPine (NORVASC) 5 MG tablet, Take 1 tablet by mouth Daily., Disp: , Rfl:     Diclofenac Sodium (VOLTAREN) 1 % gel gel, APPLY 2 GRAMS TO AFFECTED AREA 4 TIMES A DAY AS NEEDED, Disp: , Rfl:     ENBREL SURECLICK 50 MG/ML solution auto-injector, Inject 50 mg under the skin every 7 days., Disp: , Rfl:     folic acid (FOLVITE) 1 MG tablet, Take 5 tablets by mouth., Disp: , Rfl:     Linzess 145 MCG capsule capsule, Take 1 capsule by mouth Daily., Disp: , Rfl:     meloxicam (MOBIC) 15 MG tablet, Take 1 tablet by mouth Daily., Disp: , Rfl:     methotrexate 2.5 MG tablet, Take 1 tablet by mouth 1 (One) Time As Needed. 8 tabs weekly, Disp: , Rfl:     metoprolol succinate XL (TOPROL-XL) 25 MG 24 hr tablet, Take 1 tablet by mouth Daily., Disp: , Rfl:     nystatin (MYCOSTATIN) 100,000 unit/mL suspension, Swish and swallow 5 mL 4 (Four) Times a Day., Disp: 120 mL, Rfl: 1    omeprazole (priLOSEC) 20 MG capsule, Take 1 capsule by mouth Daily., Disp: , Rfl:     pantoprazole (PROTONIX) 40 MG EC tablet, Take 1 tablet by mouth Daily., Disp: , Rfl:     pilocarpine (SALAGEN) 5 MG tablet, Take 1 tablet by mouth Daily., Disp: , Rfl:     rosuvastatin (CRESTOR) 5 MG tablet, Take 1 tablet by mouth Daily., Disp: , Rfl:     venlafaxine XR (Effexor XR) 37.5 MG 24 hr capsule, Take 1 capsule by mouth Daily., Disp: 90 capsule, Rfl: 3    Objective      Blood pressure 140/78,  "pulse 59, temperature 97.7 °F (36.5 °C), temperature source Infrared, height 162.6 cm (64.02\"), weight 70.8 kg (156 lb), SpO2 98%, not currently breastfeeding.            Physical Exam     General Appearance:    Alert, cooperative, no distress, appears stated age   Head:    Normocephalic, without obvious abnormality, atraumatic   Eyes:    PERRL, conjunctiva/corneas clear, EOM's intact   Ears:    Normal TM's and external ear canals, both ears   Nose:   Nares normal, septum midline, mucosa normal, no drainage   or sinus tenderness   Throat:   Lips, mucosa, and tongue normal; teeth and gums normal   Neck:   Supple, symmetrical, trachea midline, no adenopathy;        thyroid:  No enlargement/tenderness/nodules; no carotid    bruit or JVD   Back:     Symmetric, no curvature, ROM normal, no CVA tenderness   Lungs:     Clear to auscultation bilaterally, respirations unlabored   Chest wall:    No tenderness or deformity   Heart:    Regular rate and rhythm, S1 and S2 normal, no murmur,        rub or gallop   Abdomen:     Soft, non-tender, bowel sounds active all four quadrants,     no masses, no organomegaly   Extremities:   Extremities normal, atraumatic, no cyanosis or edema   Pulses:   2+ and symmetric all extremities   Skin:   Skin color, texture, turgor normal, no rashes or lesions   Lymph nodes:   Cervical, supraclavicular, and axillary nodes normal   Neurologic:   CNII-XII intact. Normal strength, sensation and reflexes       throughout      Results for orders placed or performed in visit on 10/27/23   POC Urinalysis Dipstick, Automated    Specimen: Urine   Result Value Ref Range    Color Yellow Yellow, Straw, Dark Yellow, Mounika    Clarity, UA Clear Clear    Specific Gravity  1.030 1.005 - 1.030    pH, Urine 6.0 5.0 - 8.0    Leukocytes Trace (A) Negative    Nitrite, UA Negative Negative    Protein, POC 1+ (A) Negative mg/dL    Glucose, UA Negative Negative mg/dL    Ketones, UA Trace (A) Negative    Urobilinogen, UA " Normal Normal, 0.2 E.U./dL    Bilirubin Negative Negative    Blood, UA Negative Negative    Lot Number [id number]     Expiration Date 1/14/2025          Assessment & Plan     Pt in with rheumatology MANASA Chavez    Uric acid last year 4.1.     DEXa done Jan 2023 at Dr Hernandes office.   Start b complex    Diagnoses and all orders for this visit:    1. Right foot pain (Primary)  -     Ambulatory Referral to Podiatry  -     XR foot 3+ vw right; Future    2. Encounter for screening mammogram for malignant neoplasm of breast  -     Mammo Screening Digital Tomosynthesis Bilateral With CAD; Future    3. Vitamin B12 deficiency    4. Vitamin B6 deficiency      Return in about 8 months (around 1/2/2025) for Medicare Wellness, Annual.          There are no Patient Instructions on file for this visit.     Santo Pinon MD    Assessment & Plan

## 2024-05-09 PROBLEM — M05.79 RHEUMATOID ARTHRITIS WITH RHEUMATOID FACTOR OF MULTIPLE SITES WITHOUT ORGAN OR SYSTEMS INVOLVEMENT: Status: ACTIVE | Noted: 2024-05-09

## 2024-05-13 ENCOUNTER — OFFICE VISIT (OUTPATIENT)
Age: 69
End: 2024-05-13
Payer: MEDICARE

## 2024-05-13 ENCOUNTER — HOSPITAL ENCOUNTER (OUTPATIENT)
Dept: MAMMOGRAPHY | Facility: HOSPITAL | Age: 69
Discharge: HOME | End: 2024-05-13
Payer: MEDICARE

## 2024-05-13 ENCOUNTER — LAB (OUTPATIENT)
Facility: HOSPITAL | Age: 69
End: 2024-05-13
Payer: MEDICARE

## 2024-05-13 VITALS
WEIGHT: 159.5 LBS | TEMPERATURE: 97.1 F | BODY MASS INDEX: 27.23 KG/M2 | HEIGHT: 64 IN | SYSTOLIC BLOOD PRESSURE: 142 MMHG | HEART RATE: 73 BPM | DIASTOLIC BLOOD PRESSURE: 82 MMHG

## 2024-05-13 DIAGNOSIS — M35.00 SJOGREN'S SYNDROME, WITH UNSPECIFIED ORGAN INVOLVEMENT: ICD-10-CM

## 2024-05-13 DIAGNOSIS — D84.821 IMMUNOSUPPRESSION DUE TO DRUG THERAPY: ICD-10-CM

## 2024-05-13 DIAGNOSIS — M85.89 OSTEOPENIA OF MULTIPLE SITES: ICD-10-CM

## 2024-05-13 DIAGNOSIS — Z79.899 HIGH RISK MEDICATION USE: ICD-10-CM

## 2024-05-13 DIAGNOSIS — Z79.899 IMMUNOSUPPRESSION DUE TO DRUG THERAPY: ICD-10-CM

## 2024-05-13 DIAGNOSIS — M05.79 RHEUMATOID ARTHRITIS INVOLVING MULTIPLE SITES WITH POSITIVE RHEUMATOID FACTOR: ICD-10-CM

## 2024-05-13 DIAGNOSIS — M05.79 RHEUMATOID ARTHRITIS INVOLVING MULTIPLE SITES WITH POSITIVE RHEUMATOID FACTOR: Primary | ICD-10-CM

## 2024-05-13 DIAGNOSIS — Z12.31 ENCOUNTER FOR SCREENING MAMMOGRAM FOR MALIGNANT NEOPLASM OF BREAST: ICD-10-CM

## 2024-05-13 LAB
ALBUMIN SERPL-MCNC: 3.9 G/DL (ref 3.5–5.2)
ALP SERPL-CCNC: 87 U/L (ref 39–117)
ALT SERPL W P-5'-P-CCNC: 12 U/L (ref 1–33)
AST SERPL-CCNC: 14 U/L (ref 1–32)
BASOPHILS # BLD AUTO: 0.02 10*3/MM3 (ref 0–0.2)
BASOPHILS NFR BLD AUTO: 0.2 % (ref 0–1.5)
BILIRUB CONJ SERPL-MCNC: <0.2 MG/DL (ref 0–0.3)
BILIRUB INDIRECT SERPL-MCNC: NORMAL MG/DL
BILIRUB SERPL-MCNC: <0.2 MG/DL (ref 0–1.2)
BUN SERPL-MCNC: 19 MG/DL (ref 8–23)
CREAT SERPL-MCNC: 0.79 MG/DL (ref 0.57–1)
CRP SERPL-MCNC: 0.39 MG/DL (ref 0–0.5)
DEPRECATED RDW RBC AUTO: 43.2 FL (ref 37–54)
EGFRCR SERPLBLD CKD-EPI 2021: 82.1 ML/MIN/1.73
EOSINOPHIL # BLD AUTO: 0.05 10*3/MM3 (ref 0–0.4)
EOSINOPHIL NFR BLD AUTO: 0.5 % (ref 0.3–6.2)
ERYTHROCYTE [DISTWIDTH] IN BLOOD BY AUTOMATED COUNT: 13.2 % (ref 12.3–15.4)
ERYTHROCYTE [SEDIMENTATION RATE] IN BLOOD: 31 MM/HR (ref 0–30)
HCT VFR BLD AUTO: 38.3 % (ref 34–46.6)
HGB BLD-MCNC: 12.3 G/DL (ref 12–15.9)
IMM GRANULOCYTES # BLD AUTO: 0.08 10*3/MM3 (ref 0–0.05)
IMM GRANULOCYTES NFR BLD AUTO: 0.8 % (ref 0–0.5)
LYMPHOCYTES # BLD AUTO: 2.81 10*3/MM3 (ref 0.7–3.1)
LYMPHOCYTES NFR BLD AUTO: 28.3 % (ref 19.6–45.3)
MCH RBC QN AUTO: 29.3 PG (ref 26.6–33)
MCHC RBC AUTO-ENTMCNC: 32.1 G/DL (ref 31.5–35.7)
MCV RBC AUTO: 91.2 FL (ref 79–97)
MONOCYTES # BLD AUTO: 0.85 10*3/MM3 (ref 0.1–0.9)
MONOCYTES NFR BLD AUTO: 8.6 % (ref 5–12)
NEUTROPHILS NFR BLD AUTO: 6.13 10*3/MM3 (ref 1.7–7)
NEUTROPHILS NFR BLD AUTO: 61.6 % (ref 42.7–76)
NRBC BLD AUTO-RTO: 0 /100 WBC (ref 0–0.2)
PLATELET # BLD AUTO: 301 10*3/MM3 (ref 140–450)
PMV BLD AUTO: 10 FL (ref 6–12)
PROT SERPL-MCNC: 7.4 G/DL (ref 6–8.5)
RBC # BLD AUTO: 4.2 10*6/MM3 (ref 3.77–5.28)
WBC NRBC COR # BLD AUTO: 9.94 10*3/MM3 (ref 3.4–10.8)

## 2024-05-13 PROCEDURE — 3077F SYST BP >= 140 MM HG: CPT | Performed by: INTERNAL MEDICINE

## 2024-05-13 PROCEDURE — 82565 ASSAY OF CREATININE: CPT

## 2024-05-13 PROCEDURE — 77067 SCR MAMMO BI INCL CAD: CPT

## 2024-05-13 PROCEDURE — G2211 COMPLEX E/M VISIT ADD ON: HCPCS | Performed by: INTERNAL MEDICINE

## 2024-05-13 PROCEDURE — 85025 COMPLETE CBC W/AUTO DIFF WBC: CPT

## 2024-05-13 PROCEDURE — 86140 C-REACTIVE PROTEIN: CPT

## 2024-05-13 PROCEDURE — 3079F DIAST BP 80-89 MM HG: CPT | Performed by: INTERNAL MEDICINE

## 2024-05-13 PROCEDURE — 85652 RBC SED RATE AUTOMATED: CPT

## 2024-05-13 PROCEDURE — 99214 OFFICE O/P EST MOD 30 MIN: CPT | Performed by: INTERNAL MEDICINE

## 2024-05-13 PROCEDURE — 77063 BREAST TOMOSYNTHESIS BI: CPT

## 2024-05-13 PROCEDURE — 1159F MED LIST DOCD IN RCRD: CPT | Performed by: INTERNAL MEDICINE

## 2024-05-13 PROCEDURE — 80076 HEPATIC FUNCTION PANEL: CPT

## 2024-05-13 PROCEDURE — 84520 ASSAY OF UREA NITROGEN: CPT

## 2024-05-13 PROCEDURE — 36415 COLL VENOUS BLD VENIPUNCTURE: CPT

## 2024-05-13 PROCEDURE — 1160F RVW MEDS BY RX/DR IN RCRD: CPT | Performed by: INTERNAL MEDICINE

## 2024-05-13 RX ORDER — METHOTREXATE 2.5 MG/1
25 TABLET ORAL WEEKLY
Qty: 120 TABLET | Refills: 0 | Status: SHIPPED | OUTPATIENT
Start: 2024-05-13

## 2024-05-13 RX ORDER — METHOTREXATE 2.5 MG/1
TABLET ORAL
Qty: 128 TABLET | OUTPATIENT
Start: 2024-05-13

## 2024-05-13 RX ORDER — PREDNISONE 20 MG/1
TABLET ORAL
COMMUNITY
Start: 2024-05-09

## 2024-05-13 NOTE — ASSESSMENT & PLAN NOTE
based on positive minor salivary bx 7/5/16 with ENT    stable. Continue pilocarpine. Avoid diuretics.

## 2024-05-13 NOTE — PROGRESS NOTES
Office Follow Up      Date: 05/13/2024   Patient Name: Tiffany Martinez  MRN: [id number]  YOB: 1955    Referring Physician: Provider, No Known     Chief Complaint:   Chief Complaint   Patient presents with    Rheumatoid Arthritis       History of Present Illness: Tiffany Martinez is a 68 y.o. female who is here today for follow up on  rheumatoid arthritis.  Management with Enbrel, methotrexate, meloxicam.  No side effects of the medicine.  She continues to work at [employer].    Not doing as well.  She reports increased not doing as well.  RA flareups in her joints in the interim.  She has had to take multiple courses of prednisone.  Currently on prednisone 20 mg for torn tendons in the right foot.  Her PCP tapering every 7 days.        Subjective   Review of Systems: Review of Systems   Musculoskeletal:  Positive for arthralgias.        Muscle cramping        Medications:   Current Outpatient Medications:     amLODIPine (NORVASC) 5 MG tablet, Take 1 tablet by mouth Daily., Disp: , Rfl:     Diclofenac Sodium (VOLTAREN) 1 % gel gel, APPLY 2 GRAMS TO AFFECTED AREA 4 TIMES A DAY AS NEEDED, Disp: , Rfl:     ENBREL SURECLICK 50 MG/ML solution auto-injector, Inject 50 mg under the skin every 7 days., Disp: , Rfl:     folic acid (FOLVITE) 1 MG tablet, Take 5 tablets by mouth., Disp: , Rfl:     Linzess 145 MCG capsule capsule, Take 1 capsule by mouth Daily., Disp: , Rfl:     meloxicam (MOBIC) 15 MG tablet, Take 1 tablet by mouth Daily., Disp: , Rfl:     methotrexate 2.5 MG tablet, Take 1 tablet by mouth 1 (One) Time As Needed. 8 tabs weekly, Disp: , Rfl:     metoprolol succinate XL (TOPROL-XL) 25 MG 24 hr tablet, Take 1 tablet by mouth Daily., Disp: , Rfl:     nystatin (MYCOSTATIN) 100,000 unit/mL suspension, Swish and swallow 5 mL 4 (Four) Times a Day., Disp: 120 mL, Rfl: 1    omeprazole (priLOSEC) 20 MG capsule, Take 1 capsule by mouth Daily., Disp: , Rfl:     pantoprazole (PROTONIX) 40 MG  "EC tablet, Take 1 tablet by mouth Daily., Disp: , Rfl:     pilocarpine (SALAGEN) 5 MG tablet, Take 1 tablet by mouth Daily., Disp: , Rfl:     predniSONE (DELTASONE) 20 MG tablet, Taper as directed, Disp: , Rfl:     rosuvastatin (CRESTOR) 5 MG tablet, Take 1 tablet by mouth Daily., Disp: , Rfl:     venlafaxine XR (EFFEXOR-XR) 37.5 MG 24 hr capsule, TAKE 1 CAPSULE BY MOUTH DAILY, Disp: 90 capsule, Rfl: 3    Allergies:   Allergies   Allergen Reactions    Codeine Nausea And Vomiting and Hives       I have reviewed and updated the patient's chief complaint, history of present illness, review of systems, past medical history, surgical history, family history, social history, medications and allergy list as appropriate.     Objective    Vital Signs:   Vitals:    05/13/24 0859   BP: 142/82   BP Location: Right arm   Patient Position: Sitting   Cuff Size: Adult   Pulse: 73   Temp: 97.1 °F (36.2 °C)   Weight: 72.3 kg (159 lb 8 oz)   Height: 162.6 cm (64\")   PainSc:   7     Body mass index is 27.38 kg/m².         Physical Exam:  Physical Exam   MUSCULOSKELETAL:   No peripheral synovitis  Complete joint exam was performed including the MCPs, PIPs, DIPs of the hands, wrists, elbows, shoulders, hips, knees and ankles.  No soft tissue swelling or tenderness is present except as above.    General: The patient is well-developed and well nourished. Cooperative, alert and oriented. Affect is normal. Hydration appears normal.   HEENT: Normocephalic and atraumatic. No notable alopecia. Lids and conjunctiva are normal. Pupils are equal and sclera are clear. Oropharynx is clear   NECK neck is supple without adenopathy, masses or thyromegaly.   CARDIOVASCULAR: Regular rate and rhythm. No murmurs, rubs or gallops   LUNGS: Effort is normal. Lungs are clear bilateral   ABDOMEN: Not examined  EXTREMITIES: Peripheral pulses are intact. No clubbing.   SKIN: No rashes. No subcutaneous nodules. No digital ulcers. No sclerodactyly.   NEUROLOGIC: " "Gait is normal. Strength testing is normal.  No focal neurologic deficits        Metrics  BAH-28 (ESR): --  BAH-28 (CRP): --  Tender (BAH-28): 0 / 28   Swollen (BAH-28): 0 / 28     This Exam  Provider Global: --  Patient Global: --  ESR: --  CRP: --       Results Review:   Labs:   Lab Results   Component Value Date    GLUCOSE 106 (H) 10/31/2023    BUN 14 10/31/2023    CREATININE 0.77 10/31/2023    EGFRRESULT 84.7 10/31/2023    EGFR 67.9 10/08/2022    BCR 18.2 10/31/2023    K 4.3 10/31/2023    CO2 24.0 10/31/2023    CALCIUM 9.0 10/31/2023    PROTENTOTREF 6.7 10/31/2023    ALBUMIN 4.1 10/31/2023    BILITOT 0.2 10/31/2023    AST 17 10/31/2023    ALT 15 10/31/2023     Lab Results   Component Value Date    WBC 6.41 10/31/2023    HGB 13.6 10/31/2023    HCT 39.7 10/31/2023    MCV 92.3 10/31/2023     10/31/2023     Lab Results   Component Value Date    SEDRATE 45 (H) 07/21/2023     No results found for: \"CRP\"  No results found for: \"QUANTIFERO\", \"QUANTITB1\", \"QUANTITB2\", \"QUANTIFERN\", \"QUANTIFERM\", \"QUANTITBGLDP\"  No results found for: \"RF\"  No results found for: \"HEPBSAG\", \"HEPAIGM\", \"HEPBIGMCORE\", \"HEPCVIRUSABY\"      Assessment / Plan   Assessment & Plan  Rheumatoid arthritis involving multiple sites with positive rheumatoid factor  +CCP>250; +RF; dz start 11.2009;  works [employer] 3 days per week;   intolerant arava  *Current:  MTX 2/1/10; enbrel 8/10, meloxicam    Low to moderate disease activity.    Swollen joint count 0. Tender joint count 0.  Good prognosis    She reports more RA flareups in the interim.  Currently on tapering dose of prednisone 20mg which is helping her joints.  Recommend she increase methotrexate from 8 to 10 tablets once weekly and split dosing on the 1 day a week she takes it    Getting Enbrel through [facility] assistance.    Continue meloxicam, Mtx and Enbrel.  Well tolerated and effective.  Refilled  Discussed need for routine labs every 8-12 weeks.  Labs reviewed and are stable.    Plan will be " to continue current medication, frequent intensive lab monitoring every 8-12 weeks (CBC, CMP) for toxicity monitoring and follow up in 3 months.  Immunosuppression due to drug therapy  QuantiFERON and hepatitis panel were neg February 2024    We discussed biologic agents at length. Risks and alternative were discussed at length and the option of no treatment was also given. We discussed risks including but not limited to infections which can be unusual,  severe, and deadly. When possible, these agents should be stopped immediately if infections occur. Unusual infection such as TB and fungal infections can occur. There may be an increased risk of lymphoma with these agents. Other risks can include are multiple sclerosis like illness and worsening of the failure. Infusion or injection reactions whish can be delay have been reported.  Reactivation of daily brain virus hepatitis viruses have been reported.  Worsening of COPD has been seen with Orencia.  Elevated lipids, elevations in liver functions, and dangerous changes in blood counts have been seen with certain agents.  Regular monitoring will be required.    High risk medication use  Risks of MTX include but are not limited to severe liver damage so can be fatal, the possible need for liver biopsy, bone marrow suppression that can lead to dangerously low blood counts, GI side effects including mouth sores and diarrhea, fatigue, and the rare risk of severe pulmonary complications.  There should be no alcohol consumed with methotrexate.  Methotrexate can cause severe fetal abnormalities with his mother father is taking the medication and thus must be avoided if pregnancy is a possibility.  All medication is to be taken 1 day a week only.  The need for Q 8-12-week labs and the need for folic acid supplement were discussed.    Risks of NSAIDs discussed including GI upset, GI bleeding, renal and hepatic risks and the risks of cardiovascular disease and stroke. Warned  patient not to take with other NSAIDs including OTC NSAIDs    Sjogren's syndrome, with unspecified organ involvement  based on positive minor salivary bx 7/5/16 with ENT    stable. Continue pilocarpine. Avoid diuretics.    Osteopenia of multiple sites  Fosamax start December 2019  DEXA 12/19/2019 T-score left femoral neck -2, L-spine -1.2  DEXA 5/16/22  Left Fem neck -1.0 L spine -0.5 which has improved since 2019 scan.    DEXA 5/16/22 reviewed with patient.   Left Fem neck -1.0 L spine -0.5 which has improved since 2019 scan.  Continue Fosamax once weekly.   Osteoporosis handout given.   Continue Calcium + D OTC  Continue regular weightbearing exercise.   Consider stopping alendronate after 5 years (12/24)      Orders Placed This Encounter   Procedures    BUN+Creat ([facility])    CBC Auto Differential    C-reactive Protein    Hepatic Function Panel    Sedimentation Rate              Follow Up:   Return in about 3 months (around 8/13/2024).        Renan Wong MD  [facility] [place]

## 2024-05-13 NOTE — ASSESSMENT & PLAN NOTE
+CCP>250; +RF; dz start 11.2009;  works [employer] 3 days per week;   intolerant arava  *Current:  MTX 2/1/10; enbrel 8/10, meloxicam    Low to moderate disease activity.    Swollen joint count 0. Tender joint count 0.  Good prognosis    She reports more RA flareups in the interim.  Currently on tapering dose of prednisone 20mg which is helping her joints.  Recommend she increase methotrexate from 8 to 10 tablets once weekly and split dosing on the 1 day a week she takes it    Getting Enbrel through [employer] assistance.    Continue meloxicam, Mtx and Enbrel.  Well tolerated and effective.  Refilled  Discussed need for routine labs every 8-12 weeks.  Labs reviewed and are stable.    Plan will be to continue current medication, frequent intensive lab monitoring every 8-12 weeks (CBC, CMP) for toxicity monitoring and follow up in 3 months.

## 2024-05-16 PROCEDURE — 77067 SCR MAMMO BI INCL CAD: CPT | Performed by: RADIOLOGY

## 2024-05-16 PROCEDURE — 77063 BREAST TOMOSYNTHESIS BI: CPT | Performed by: RADIOLOGY

## 2024-07-02 RX ORDER — MELOXICAM 15 MG/1
TABLET ORAL
Qty: 90 TABLET | Refills: 1 | Status: SHIPPED | OUTPATIENT
Start: 2024-07-02

## 2024-08-12 ENCOUNTER — SPECIALTY PHARMACY (OUTPATIENT)
Age: 69
End: 2024-08-12
Payer: MEDICARE

## 2024-08-14 ENCOUNTER — LAB (OUTPATIENT)
Facility: HOSPITAL | Age: 69
End: 2024-08-14
Payer: MEDICARE

## 2024-08-14 ENCOUNTER — OFFICE VISIT (OUTPATIENT)
Age: 69
End: 2024-08-14
Payer: MEDICARE

## 2024-08-14 VITALS
SYSTOLIC BLOOD PRESSURE: 140 MMHG | WEIGHT: 159.6 LBS | TEMPERATURE: 97.2 F | HEIGHT: 64 IN | DIASTOLIC BLOOD PRESSURE: 80 MMHG | HEART RATE: 67 BPM | BODY MASS INDEX: 27.25 KG/M2

## 2024-08-14 DIAGNOSIS — Z79.899 HIGH RISK MEDICATION USE: ICD-10-CM

## 2024-08-14 DIAGNOSIS — D84.821 IMMUNOSUPPRESSION DUE TO DRUG THERAPY: ICD-10-CM

## 2024-08-14 DIAGNOSIS — Z79.899 IMMUNOSUPPRESSION DUE TO DRUG THERAPY: ICD-10-CM

## 2024-08-14 DIAGNOSIS — M05.79 RHEUMATOID ARTHRITIS INVOLVING MULTIPLE SITES WITH POSITIVE RHEUMATOID FACTOR: Primary | ICD-10-CM

## 2024-08-14 DIAGNOSIS — M05.79 RHEUMATOID ARTHRITIS INVOLVING MULTIPLE SITES WITH POSITIVE RHEUMATOID FACTOR: ICD-10-CM

## 2024-08-14 LAB
ALBUMIN SERPL-MCNC: 3.9 G/DL (ref 3.5–5.2)
ALBUMIN/GLOB SERPL: 1.3 G/DL
ALP SERPL-CCNC: 89 U/L (ref 39–117)
ALT SERPL W P-5'-P-CCNC: 12 U/L (ref 1–33)
ANION GAP SERPL CALCULATED.3IONS-SCNC: 10.2 MMOL/L (ref 5–15)
AST SERPL-CCNC: 20 U/L (ref 1–32)
BASOPHILS # BLD AUTO: 0.03 10*3/MM3 (ref 0–0.2)
BASOPHILS NFR BLD AUTO: 0.4 % (ref 0–1.5)
BILIRUB SERPL-MCNC: 0.2 MG/DL (ref 0–1.2)
BUN SERPL-MCNC: 16 MG/DL (ref 8–23)
BUN/CREAT SERPL: 18.8 (ref 7–25)
CALCIUM SPEC-SCNC: 9.7 MG/DL (ref 8.6–10.5)
CHLORIDE SERPL-SCNC: 105 MMOL/L (ref 98–107)
CO2 SERPL-SCNC: 24.8 MMOL/L (ref 22–29)
CREAT SERPL-MCNC: 0.85 MG/DL (ref 0.57–1)
CRP SERPL-MCNC: 0.91 MG/DL (ref 0–0.5)
DEPRECATED RDW RBC AUTO: 49.6 FL (ref 37–54)
EGFRCR SERPLBLD CKD-EPI 2021: 74.7 ML/MIN/1.73
EOSINOPHIL # BLD AUTO: 0.28 10*3/MM3 (ref 0–0.4)
EOSINOPHIL NFR BLD AUTO: 4.1 % (ref 0.3–6.2)
ERYTHROCYTE [DISTWIDTH] IN BLOOD BY AUTOMATED COUNT: 14.4 % (ref 12.3–15.4)
ERYTHROCYTE [SEDIMENTATION RATE] IN BLOOD: 27 MM/HR (ref 0–30)
GLOBULIN UR ELPH-MCNC: 3.1 GM/DL
GLUCOSE SERPL-MCNC: 93 MG/DL (ref 65–99)
HCT VFR BLD AUTO: 37.7 % (ref 34–46.6)
HGB BLD-MCNC: 12.2 G/DL (ref 12–15.9)
IMM GRANULOCYTES # BLD AUTO: 0.02 10*3/MM3 (ref 0–0.05)
IMM GRANULOCYTES NFR BLD AUTO: 0.3 % (ref 0–0.5)
LYMPHOCYTES # BLD AUTO: 1.13 10*3/MM3 (ref 0.7–3.1)
LYMPHOCYTES NFR BLD AUTO: 16.7 % (ref 19.6–45.3)
MCH RBC QN AUTO: 30.5 PG (ref 26.6–33)
MCHC RBC AUTO-ENTMCNC: 32.4 G/DL (ref 31.5–35.7)
MCV RBC AUTO: 94.3 FL (ref 79–97)
MONOCYTES # BLD AUTO: 0.38 10*3/MM3 (ref 0.1–0.9)
MONOCYTES NFR BLD AUTO: 5.6 % (ref 5–12)
NEUTROPHILS NFR BLD AUTO: 4.94 10*3/MM3 (ref 1.7–7)
NEUTROPHILS NFR BLD AUTO: 72.9 % (ref 42.7–76)
NRBC BLD AUTO-RTO: 0 /100 WBC (ref 0–0.2)
PLATELET # BLD AUTO: 242 10*3/MM3 (ref 140–450)
PMV BLD AUTO: 10.5 FL (ref 6–12)
POTASSIUM SERPL-SCNC: 4.2 MMOL/L (ref 3.5–5.2)
PROT SERPL-MCNC: 7 G/DL (ref 6–8.5)
RBC # BLD AUTO: 4 10*6/MM3 (ref 3.77–5.28)
SODIUM SERPL-SCNC: 140 MMOL/L (ref 136–145)
WBC NRBC COR # BLD AUTO: 6.78 10*3/MM3 (ref 3.4–10.8)

## 2024-08-14 PROCEDURE — 1160F RVW MEDS BY RX/DR IN RCRD: CPT | Performed by: INTERNAL MEDICINE

## 2024-08-14 PROCEDURE — 36415 COLL VENOUS BLD VENIPUNCTURE: CPT

## 2024-08-14 PROCEDURE — 85025 COMPLETE CBC W/AUTO DIFF WBC: CPT

## 2024-08-14 PROCEDURE — 85652 RBC SED RATE AUTOMATED: CPT

## 2024-08-14 PROCEDURE — 99214 OFFICE O/P EST MOD 30 MIN: CPT | Performed by: INTERNAL MEDICINE

## 2024-08-14 PROCEDURE — 86140 C-REACTIVE PROTEIN: CPT

## 2024-08-14 PROCEDURE — 3077F SYST BP >= 140 MM HG: CPT | Performed by: INTERNAL MEDICINE

## 2024-08-14 PROCEDURE — G2211 COMPLEX E/M VISIT ADD ON: HCPCS | Performed by: INTERNAL MEDICINE

## 2024-08-14 PROCEDURE — 3079F DIAST BP 80-89 MM HG: CPT | Performed by: INTERNAL MEDICINE

## 2024-08-14 PROCEDURE — 80053 COMPREHEN METABOLIC PANEL: CPT

## 2024-08-14 PROCEDURE — 1159F MED LIST DOCD IN RCRD: CPT | Performed by: INTERNAL MEDICINE

## 2024-08-14 RX ORDER — FOLIC ACID 1 MG/1
5000 TABLET ORAL DAILY
Qty: 450 TABLET | Refills: 3 | Status: SHIPPED | OUTPATIENT
Start: 2024-08-14

## 2024-08-14 RX ORDER — CICLOPIROX 80 MG/ML
SOLUTION TOPICAL
COMMUNITY
Start: 2024-07-23

## 2024-08-14 RX ORDER — MELOXICAM 15 MG/1
15 TABLET ORAL DAILY PRN
Qty: 90 TABLET | Refills: 1 | Status: SHIPPED | OUTPATIENT
Start: 2024-08-14

## 2024-08-14 RX ORDER — SODIUM FLUORIDE 5 MG/G
CREAM DENTAL
COMMUNITY
Start: 2024-07-03

## 2024-08-14 NOTE — PROGRESS NOTES
Office Follow Up      Date: 08/14/2024   Patient Name: Tiffany Martinez  MRN: [id number]  YOB: 1955    Referring Physician: Provider, No Known     Chief Complaint:   Chief Complaint   Patient presents with    Rheumatoid Arthritis       History of Present Illness: Tiffany Martinez is a 69 y.o. female who is here today for follow up on  rheumatoid arthritis.      Management with Enbrel, methotrexate, meloxicam.  No side effects of the medicine.  She continues to work at [employer].    Doing better recently.  Mild flareup second third MCP joint hand is getting better.  No severe flareups of arthritis.          Subjective   Review of Systems: Review of Systems   Constitutional:  Negative for chills, fatigue, fever and unexpected weight loss.   HENT:  Negative for mouth sores, sinus pressure and sore throat.    Eyes:  Negative for pain and redness.   Respiratory:  Negative for cough and shortness of breath.    Cardiovascular:  Negative for chest pain.   Gastrointestinal:  Negative for abdominal pain, blood in stool, diarrhea, nausea, vomiting and GERD.   Endocrine: Negative for polydipsia and polyuria.   Genitourinary:  Negative for dysuria, genital sores and hematuria.   Musculoskeletal:  Positive for arthralgias. Negative for back pain, joint swelling, myalgias, neck pain and neck stiffness.        Muscle cramping   Skin:  Negative for rash and bruise.   Allergic/Immunologic: Negative for immunocompromised state.   Neurological:  Negative for seizures, weakness, numbness and memory problem.   Hematological:  Negative for adenopathy. Does not bruise/bleed easily.   Psychiatric/Behavioral:  Negative for depressed mood. The patient is not nervous/anxious.         Medications:   Current Outpatient Medications:     amLODIPine (NORVASC) 5 MG tablet, Take 1 tablet by mouth Daily., Disp: , Rfl:     ciclopirox (PENLAC) 8 % solution, APPLY ONCE EVERY DAY TO TOENAILS, Disp: , Rfl:     Diclofenac  "Sodium (VOLTAREN) 1 % gel gel, APPLY 2 GRAMS TO AFFECTED AREA 4 TIMES A DAY AS NEEDED, Disp: , Rfl:     ENBREL SURECLICK 50 MG/ML solution auto-injector, Inject 50 mg under the skin every 7 days., Disp: , Rfl:     folic acid (FOLVITE) 1 MG tablet, Take 5 tablets by mouth Daily., Disp: 450 tablet, Rfl: 3    Linzess 145 MCG capsule capsule, Take 1 capsule by mouth Daily., Disp: , Rfl:     meloxicam (MOBIC) 15 MG tablet, Take 1 tablet by mouth Daily As Needed for Mild Pain., Disp: 90 tablet, Rfl: 1    methotrexate 2.5 MG tablet, Take 10 tablets by mouth 1 (One) Time Per Week., Disp: 120 tablet, Rfl: 0    metoprolol succinate XL (TOPROL-XL) 25 MG 24 hr tablet, Take 1 tablet by mouth Daily., Disp: , Rfl:     omeprazole (priLOSEC) 20 MG capsule, Take 1 capsule by mouth Daily., Disp: , Rfl:     pantoprazole (PROTONIX) 40 MG EC tablet, Take 1 tablet by mouth Daily., Disp: , Rfl:     pilocarpine (SALAGEN) 5 MG tablet, Take 1 tablet by mouth Daily., Disp: , Rfl:     rosuvastatin (CRESTOR) 5 MG tablet, Take 1 tablet by mouth Daily., Disp: , Rfl:     Sodium Fluoride 5000 Plus 1.1 % cream, USE TO BRUSH TEETH EVERY DAY, Disp: , Rfl:     Allergies:   Allergies   Allergen Reactions    Codeine Nausea And Vomiting and Hives       I have reviewed and updated the patient's chief complaint, history of present illness, review of systems, past medical history, surgical history, family history, social history, medications and allergy list as appropriate.     Objective    Vital Signs:   Vitals:    08/14/24 0833   BP: 140/80   BP Location: Right arm   Patient Position: Sitting   Cuff Size: Adult   Pulse: 67   Temp: 97.2 °F (36.2 °C)   Weight: 72.4 kg (159 lb 9.6 oz)   Height: 162.6 cm (64\")   PainSc:   2   PainLoc: Generalized       Body mass index is 27.4 kg/m².         Physical Exam:  Physical Exam   MUSCULOSKELETAL:   No peripheral synovitis  Slight tenderness second third MCP joints right hand    Complete joint exam was performed " "including the MCPs, PIPs, DIPs of the hands, wrists, elbows, shoulders, hips, knees and ankles.  No soft tissue swelling or second third MCP joints is present except as above.    General: The patient is well-developed and well nourished. Cooperative, alert and oriented. Affect is normal. Hydration appears normal.   HEENT: Normocephalic and atraumatic. No notable alopecia. Lids and conjunctiva are normal. Pupils are equal and sclera are clear. Oropharynx is clear   NECK neck is supple without adenopathy, masses or thyromegaly.   CARDIOVASCULAR: Regular rate and rhythm. No murmurs, rubs or gallops   LUNGS: Effort is normal. Lungs are clear bilateral   ABDOMEN: Not examined  EXTREMITIES: Peripheral pulses are intact. No clubbing.   SKIN: No rashes. No subcutaneous nodules. No digital ulcers. No sclerodactyly.   NEUROLOGIC: Gait is normal. Strength testing is normal.  No focal neurologic deficits       Results Review:   Labs:   Lab Results   Component Value Date    GLUCOSE 106 (H) 10/31/2023    BUN 19 05/13/2024    CREATININE 0.79 05/13/2024    EGFRRESULT 84.7 10/31/2023    EGFR 82.1 05/13/2024    BCR 18.2 10/31/2023    K 4.3 10/31/2023    CO2 24.0 10/31/2023    CALCIUM 9.0 10/31/2023    PROTENTOTREF 6.7 10/31/2023    ALBUMIN 3.9 05/13/2024    BILITOT <0.2 05/13/2024    AST 14 05/13/2024    ALT 12 05/13/2024     Lab Results   Component Value Date    WBC 9.94 05/13/2024    HGB 12.3 05/13/2024    HCT 38.3 05/13/2024    MCV 91.2 05/13/2024     05/13/2024     Lab Results   Component Value Date    SEDRATE 31 (H) 05/13/2024     Lab Results   Component Value Date    CRP 0.39 05/13/2024     No results found for: \"QUANTIFERO\", \"QUANTITB1\", \"QUANTITB2\", \"QUANTIFERN\", \"QUANTIFERM\", \"QUANTITBGLDP\"  No results found for: \"RF\"  No results found for: \"HEPBSAG\", \"HEPAIGM\", \"HEPBIGMCORE\", \"HEPCVIRUSABY\"          Assessment and plan    Rheumatoid arthritis involving multiple sites with positive rheumatoid factor  +CCP>250; +RF; dz " start 11.2009;  works [employer] 3 days per week;   intolerant arava  **Current:  MTX 2/10 enbrel 8/10([employer] assistance), meloxicam     Low to moderate disease activity.    Swollen joint count 0. Tender joint count 2.  Good prognosis     Continue Enbrel through [employer] assistance.    Continue meloxicam, Mtx 10 tablets once weekly.  Well tolerated and effective.    Discussed need for routine labs every 12 weeks.  Labs reviewed and are stable.    Plan will be to continue current medication, frequent intensive lab monitoring every 12 weeks (CBC, CMP) for toxicity monitoring and follow up in 3 months.    Immunosuppression due to drug therapy  QuantiFERON and hepatitis panel were neg February 2024     We discussed biologic agents at length. Risks and alternative were discussed at length and the option of no treatment was also given. We discussed risks including but not limited to infections which can be unusual,  severe, and deadly. When possible, these agents should be stopped immediately if infections occur. Unusual infection such as TB and fungal infections can occur. There may be an increased risk of lymphoma with these agents. Other risks can include are multiple sclerosis like illness and worsening of the failure. Infusion or injection reactions whish can be delay have been reported.  Reactivation of daily brain virus hepatitis viruses have been reported.  Worsening of COPD has been seen with Orencia.  Elevated lipids, elevations in liver functions, and dangerous changes in blood counts have been seen with certain agents.  Regular monitoring will be required.     High risk medication use  Risks of MTX include but are not limited to severe liver damage so can be fatal, the possible need for liver biopsy, bone marrow suppression that can lead to dangerously low blood counts, GI side effects including mouth sores and diarrhea, fatigue, and the rare risk of severe pulmonary complications.  There should be no alcohol consumed  with methotrexate.  Methotrexate can cause severe fetal abnormalities with his mother father is taking the medication and thus must be avoided if pregnancy is a possibility.  All medication is to be taken 1 day a week only.  The need for Q 8-12-week labs and the need for folic acid supplement were discussed.     Risks of NSAIDs discussed including GI upset, GI bleeding, renal and hepatic risks and the risks of cardiovascular disease and stroke. Warned patient not to take with other NSAIDs including OTC NSAIDs     Sjogren's syndrome, with unspecified organ involvement  based on positive minor salivary bx 7/5/16 with ENT     stable. Continue pilocarpine. Avoid diuretics.     Osteopenia of multiple sites  Fosamax start December 2019  DEXA 12/19/2019 T-score left femoral neck -2, L-spine -1.2  DEXA 5/16/22  Left Fem neck -1.0 L spine -0.5 which has improved since 2019 scan.     DEXA 5/16/22 reviewed with patient.   Left Fem neck -1.0 L spine -0.5 which has improved since 2019 scan.  Continue Fosamax once weekly.   Osteoporosis handout given.   Continue Calcium + D OTC  Continue regular weightbearing exercise.   Consider stopping alendronate after 5 years (12/24)      Assessment / Plan   Assessment & Plan  Rheumatoid arthritis involving multiple sites with positive rheumatoid factor    Immunosuppression due to drug therapy    High risk medication use      Orders Placed This Encounter   Procedures    CBC Auto Differential    Comprehensive Metabolic Panel    C-reactive Protein    Sedimentation Rate       New Medications Ordered This Visit   Medications    folic acid (FOLVITE) 1 MG tablet     Sig: Take 5 tablets by mouth Daily.     Dispense:  450 tablet     Refill:  3    meloxicam (MOBIC) 15 MG tablet     Sig: Take 1 tablet by mouth Daily As Needed for Mild Pain.     Dispense:  90 tablet     Refill:  1           Follow Up:   Return in about 3 months (around 11/14/2024).        Renan Wong MD  [facility]   Procedures

## 2024-08-30 ENCOUNTER — OFFICE VISIT (OUTPATIENT)
Dept: INTERNAL MEDICINE | Facility: CLINIC | Age: 69
End: 2024-08-30
Payer: MEDICARE

## 2024-08-30 ENCOUNTER — HOSPITAL ENCOUNTER (OUTPATIENT)
Dept: ULTRASOUND IMAGING | Facility: HOSPITAL | Age: 69
Discharge: HOME | End: 2024-08-30
Payer: MEDICARE

## 2024-08-30 VITALS
BODY MASS INDEX: 26.63 KG/M2 | WEIGHT: 156 LBS | HEART RATE: 78 BPM | DIASTOLIC BLOOD PRESSURE: 80 MMHG | TEMPERATURE: 96.2 F | OXYGEN SATURATION: 97 % | RESPIRATION RATE: 16 BRPM | SYSTOLIC BLOOD PRESSURE: 148 MMHG | HEIGHT: 64 IN

## 2024-08-30 DIAGNOSIS — I10 PRIMARY HYPERTENSION: ICD-10-CM

## 2024-08-30 DIAGNOSIS — R60.0 LEG EDEMA, LEFT: Primary | ICD-10-CM

## 2024-08-30 PROCEDURE — G2211 COMPLEX E/M VISIT ADD ON: HCPCS | Performed by: INTERNAL MEDICINE

## 2024-08-30 PROCEDURE — 1125F AMNT PAIN NOTED PAIN PRSNT: CPT | Performed by: INTERNAL MEDICINE

## 2024-08-30 PROCEDURE — 99214 OFFICE O/P EST MOD 30 MIN: CPT | Performed by: INTERNAL MEDICINE

## 2024-08-30 PROCEDURE — 93971 EXTREMITY STUDY: CPT

## 2024-08-30 PROCEDURE — 3079F DIAST BP 80-89 MM HG: CPT | Performed by: INTERNAL MEDICINE

## 2024-08-30 PROCEDURE — 3077F SYST BP >= 140 MM HG: CPT | Performed by: INTERNAL MEDICINE

## 2024-08-30 RX ORDER — ROSUVASTATIN CALCIUM 5 MG/1
5 TABLET, COATED ORAL DAILY
Qty: 90 TABLET | Refills: 1 | Status: SHIPPED | OUTPATIENT
Start: 2024-08-30

## 2024-08-30 RX ORDER — METOPROLOL SUCCINATE 50 MG/1
50 TABLET, EXTENDED RELEASE ORAL DAILY
Qty: 30 TABLET | Refills: 9 | Status: SHIPPED | OUTPATIENT
Start: 2024-08-30

## 2024-08-30 NOTE — PROGRESS NOTES
Subjective     Patient ID: Tiffany Martinez is a 69 y.o. female. Patient is here for management of multiple medical problems.     Chief Complaint   Patient presents with    Foot Swelling     Left    Foot Pain     Left       History of Present Illness   Left foot pain and swelling.   Forfoot. 10am and gets worse thru the day.  Started 3-4 days ago.       MTX weekly and mobic daily    Dvt left leg last year may.             The following portions of the patient's history were reviewed and updated as appropriate: allergies, current medications, past family history, past medical history, past social history, past surgical history and problem list.    Review of Systems    Current Outpatient Medications:     amLODIPine (NORVASC) 5 MG tablet, Take 1 tablet by mouth Daily., Disp: , Rfl:     ciclopirox (PENLAC) 8 % solution, APPLY ONCE EVERY DAY TO TOENAILS, Disp: , Rfl:     Diclofenac Sodium (VOLTAREN) 1 % gel gel, APPLY 2 GRAMS TO AFFECTED AREA 4 TIMES A DAY AS NEEDED, Disp: , Rfl:     ENBREL SURECLICK 50 MG/ML solution auto-injector, Inject 50 mg under the skin every 7 days., Disp: , Rfl:     folic acid (FOLVITE) 1 MG tablet, Take 5 tablets by mouth Daily., Disp: 450 tablet, Rfl: 3    Linzess 145 MCG capsule capsule, Take 1 capsule by mouth Daily., Disp: , Rfl:     meloxicam (MOBIC) 15 MG tablet, Take 1 tablet by mouth Daily As Needed for Mild Pain., Disp: 90 tablet, Rfl: 1    methotrexate 2.5 MG tablet, Take 10 tablets by mouth 1 (One) Time Per Week., Disp: 120 tablet, Rfl: 0    metoprolol succinate XL (TOPROL-XL) 50 MG 24 hr tablet, Take 1 tablet by mouth Daily., Disp: 30 tablet, Rfl: 9    omeprazole (priLOSEC) 20 MG capsule, Take 1 capsule by mouth Daily., Disp: , Rfl:     pantoprazole (PROTONIX) 40 MG EC tablet, Take 1 tablet by mouth Daily., Disp: , Rfl:     pilocarpine (SALAGEN) 5 MG tablet, Take 1 tablet by mouth Daily., Disp: , Rfl:     rosuvastatin (CRESTOR) 5 MG tablet, Take 1 tablet by mouth Daily., Disp: 90  "tablet, Rfl: 1    Sodium Fluoride 5000 Plus 1.1 % cream, USE TO BRUSH TEETH EVERY DAY, Disp: , Rfl:     Objective      Blood pressure 148/80, pulse 78, temperature 96.2 °F (35.7 °C), resp. rate 16, height 162.6 cm (64\"), weight 70.8 kg (156 lb), SpO2 97%, not currently breastfeeding.            Physical Exam     General Appearance:    Alert, cooperative, no distress, appears stated age   Head:    Normocephalic, without obvious abnormality, atraumatic   Eyes:    PERRL, conjunctiva/corneas clear, EOM's intact   Ears:    Normal TM's and external ear canals, both ears   Nose:   Nares normal, septum midline, mucosa normal, no drainage   or sinus tenderness   Throat:   Lips, mucosa, and tongue normal; teeth and gums normal   Neck:   Supple, symmetrical, trachea midline, no adenopathy;        thyroid:  No enlargement/tenderness/nodules; no carotid    bruit or JVD   Back:     Symmetric, no curvature, ROM normal, no CVA tenderness   Lungs:     Clear to auscultation bilaterally, respirations unlabored   Chest wall:    No tenderness or deformity   Heart:    Regular rate and rhythm, S1 and S2 normal, no murmur,        rub or gallop   Abdomen:     Soft, non-tender, bowel sounds active all four quadrants,     no masses, no organomegaly   Extremities:   Extremities normal, atraumatic, no cyanosis or edema   Pulses:   2+ and symmetric all extremities   Skin:   Skin color, texture, turgor normal, no rashes or lesions   Lymph nodes:   Cervical, supraclavicular, and axillary nodes normal   Neurologic:   CNII-XII intact. Normal strength, sensation and reflexes       throughout      Results for orders placed or performed in visit on 08/14/24   CBC Auto Differential    Specimen: Blood   Result Value Ref Range    WBC 6.78 3.40 - 10.80 10*3/mm3    RBC 4.00 3.77 - 5.28 10*6/mm3    Hemoglobin 12.2 12.0 - 15.9 g/dL    Hematocrit 37.7 34.0 - 46.6 %    MCV 94.3 79.0 - 97.0 fL    MCH 30.5 26.6 - 33.0 pg    MCHC 32.4 31.5 - 35.7 g/dL    RDW 14.4 " 12.3 - 15.4 %    RDW-SD 49.6 37.0 - 54.0 fl    MPV 10.5 6.0 - 12.0 fL    Platelets 242 140 - 450 10*3/mm3    Neutrophil % 72.9 42.7 - 76.0 %    Lymphocyte % 16.7 (L) 19.6 - 45.3 %    Monocyte % 5.6 5.0 - 12.0 %    Eosinophil % 4.1 0.3 - 6.2 %    Basophil % 0.4 0.0 - 1.5 %    Immature Grans % 0.3 0.0 - 0.5 %    Neutrophils, Absolute 4.94 1.70 - 7.00 10*3/mm3    Lymphocytes, Absolute 1.13 0.70 - 3.10 10*3/mm3    Monocytes, Absolute 0.38 0.10 - 0.90 10*3/mm3    Eosinophils, Absolute 0.28 0.00 - 0.40 10*3/mm3    Basophils, Absolute 0.03 0.00 - 0.20 10*3/mm3    Immature Grans, Absolute 0.02 0.00 - 0.05 10*3/mm3    nRBC 0.0 0.0 - 0.2 /100 WBC   Comprehensive Metabolic Panel    Specimen: Blood   Result Value Ref Range    Glucose 93 65 - 99 mg/dL    BUN 16 8 - 23 mg/dL    Creatinine 0.85 0.57 - 1.00 mg/dL    Sodium 140 136 - 145 mmol/L    Potassium 4.2 3.5 - 5.2 mmol/L    Chloride 105 98 - 107 mmol/L    CO2 24.8 22.0 - 29.0 mmol/L    Calcium 9.7 8.6 - 10.5 mg/dL    Total Protein 7.0 6.0 - 8.5 g/dL    Albumin 3.9 3.5 - 5.2 g/dL    ALT (SGPT) 12 1 - 33 U/L    AST (SGOT) 20 1 - 32 U/L    Alkaline Phosphatase 89 39 - 117 U/L    Total Bilirubin 0.2 0.0 - 1.2 mg/dL    Globulin 3.1 gm/dL    A/G Ratio 1.3 g/dL    BUN/Creatinine Ratio 18.8 7.0 - 25.0    Anion Gap 10.2 5.0 - 15.0 mmol/L    eGFR 74.7 >60.0 mL/min/1.73   C-reactive Protein    Specimen: Blood   Result Value Ref Range    C-Reactive Protein 0.91 (H) 0.00 - 0.50 mg/dL   Sedimentation Rate    Specimen: Blood   Result Value Ref Range    Sed Rate 27 0 - 30 mm/hr         Assessment & Plan       Diagnoses and all orders for this visit:    1. Leg edema, left (Primary)  -     Duplex Venous Lower Extremity - Left CAR  -     US Venous Doppler Lower Extremity Left (duplex)    2. Primary hypertension  -     metoprolol succinate XL (TOPROL-XL) 50 MG 24 hr tablet; Take 1 tablet by mouth Daily.  Dispense: 30 tablet; Refill: 9    Other orders  -     rosuvastatin (CRESTOR) 5 MG tablet;  Take 1 tablet by mouth Daily.  Dispense: 90 tablet; Refill: 1      Return in about 4 weeks (around 9/27/2024).          There are no Patient Instructions on file for this visit.     Santo Pinon MD    Assessment & Plan       Answers submitted by the patient for this visit:  Other (Submitted on 8/29/2024)  Please describe your symptoms.: Left foot swelling  Have you had these symptoms before?: Yes  How long have you been having these symptoms?: 5-7 days  Primary Reason for Visit (Submitted on 8/29/2024)  What is the primary reason for your visit?: Other

## 2024-09-11 ENCOUNTER — OFFICE VISIT (OUTPATIENT)
Dept: INTERNAL MEDICINE | Facility: CLINIC | Age: 69
End: 2024-09-11
Payer: MEDICARE

## 2024-09-11 VITALS
HEART RATE: 81 BPM | OXYGEN SATURATION: 98 % | TEMPERATURE: 96.5 F | HEIGHT: 64 IN | RESPIRATION RATE: 16 BRPM | WEIGHT: 157 LBS | SYSTOLIC BLOOD PRESSURE: 130 MMHG | DIASTOLIC BLOOD PRESSURE: 80 MMHG | BODY MASS INDEX: 26.8 KG/M2

## 2024-09-11 DIAGNOSIS — R22.42 KNEE MASS, LEFT: Primary | ICD-10-CM

## 2024-09-11 DIAGNOSIS — R60.0 LOCALIZED EDEMA: ICD-10-CM

## 2024-09-11 PROCEDURE — G2211 COMPLEX E/M VISIT ADD ON: HCPCS | Performed by: INTERNAL MEDICINE

## 2024-09-11 PROCEDURE — 1125F AMNT PAIN NOTED PAIN PRSNT: CPT | Performed by: INTERNAL MEDICINE

## 2024-09-11 PROCEDURE — 99213 OFFICE O/P EST LOW 20 MIN: CPT | Performed by: INTERNAL MEDICINE

## 2024-09-11 PROCEDURE — 3075F SYST BP GE 130 - 139MM HG: CPT | Performed by: INTERNAL MEDICINE

## 2024-09-11 PROCEDURE — 3079F DIAST BP 80-89 MM HG: CPT | Performed by: INTERNAL MEDICINE

## 2024-09-11 NOTE — PROGRESS NOTES
Subjective     Patient ID: Tiffany Martinez is a 69 y.o. female. Patient is here for management of multiple medical problems.     Chief Complaint   Patient presents with    Leg Swelling     Left leg    Foot Swelling     Left foot     History of Present Illness   Left foot swelling.   Abnormal finding on U/S        The following portions of the patient's history were reviewed and updated as appropriate: allergies, current medications, past family history, past medical history, past social history, past surgical history and problem list.    Review of Systems    Current Outpatient Medications:     amLODIPine (NORVASC) 5 MG tablet, Take 1 tablet by mouth Daily., Disp: , Rfl:     ciclopirox (PENLAC) 8 % solution, APPLY ONCE EVERY DAY TO TOENAILS, Disp: , Rfl:     Diclofenac Sodium (VOLTAREN) 1 % gel gel, APPLY 2 GRAMS TO AFFECTED AREA 4 TIMES A DAY AS NEEDED, Disp: , Rfl:     ENBREL SURECLICK 50 MG/ML solution auto-injector, Inject 50 mg under the skin every 7 days., Disp: , Rfl:     folic acid (FOLVITE) 1 MG tablet, Take 5 tablets by mouth Daily., Disp: 450 tablet, Rfl: 3    Linzess 145 MCG capsule capsule, Take 1 capsule by mouth Daily., Disp: , Rfl:     meloxicam (MOBIC) 15 MG tablet, Take 1 tablet by mouth Daily As Needed for Mild Pain., Disp: 90 tablet, Rfl: 1    methotrexate 2.5 MG tablet, Take 10 tablets by mouth 1 (One) Time Per Week., Disp: 120 tablet, Rfl: 0    metoprolol succinate XL (TOPROL-XL) 50 MG 24 hr tablet, Take 1 tablet by mouth Daily., Disp: 30 tablet, Rfl: 9    omeprazole (priLOSEC) 20 MG capsule, Take 1 capsule by mouth Daily., Disp: , Rfl:     pantoprazole (PROTONIX) 40 MG EC tablet, Take 1 tablet by mouth Daily., Disp: , Rfl:     pilocarpine (SALAGEN) 5 MG tablet, Take 1 tablet by mouth Daily., Disp: , Rfl:     rosuvastatin (CRESTOR) 5 MG tablet, Take 1 tablet by mouth Daily., Disp: 90 tablet, Rfl: 1    Sodium Fluoride 5000 Plus 1.1 % cream, USE TO BRUSH TEETH EVERY DAY, Disp: , Rfl:     Objective  "     Blood pressure 130/80, pulse 81, temperature 96.5 °F (35.8 °C), resp. rate 16, height 162.6 cm (64\"), weight 71.2 kg (157 lb), SpO2 98%, not currently breastfeeding.            Physical Exam     General Appearance:    Alert, cooperative, no distress, appears stated age   Head:    Normocephalic, without obvious abnormality, atraumatic   Eyes:    PERRL, conjunctiva/corneas clear, EOM's intact   Ears:    Normal TM's and external ear canals, both ears   Nose:   Nares normal, septum midline, mucosa normal, no drainage   or sinus tenderness   Throat:   Lips, mucosa, and tongue normal; teeth and gums normal   Neck:   Supple, symmetrical, trachea midline, no adenopathy;        thyroid:  No enlargement/tenderness/nodules; no carotid    bruit or JVD   Back:     Symmetric, no curvature, ROM normal, no CVA tenderness   Lungs:     Clear to auscultation bilaterally, respirations unlabored   Chest wall:    No tenderness or deformity   Heart:    Regular rate and rhythm, S1 and S2 normal, no murmur,        rub or gallop   Abdomen:     Soft, non-tender, bowel sounds active all four quadrants,     no masses, no organomegaly   Extremities:   Extremities normal, atraumatic, no cyanosis or edema   Pulses:   2+ and symmetric all extremities   Skin:   Skin color, texture, turgor normal, no rashes or lesions   Lymph nodes:   Cervical, supraclavicular, and axillary nodes normal   Neurologic:   CNII-XII intact. Normal strength, sensation and reflexes       throughout      Results for orders placed or performed in visit on 08/14/24   CBC Auto Differential    Specimen: Blood   Result Value Ref Range    WBC 6.78 3.40 - 10.80 10*3/mm3    RBC 4.00 3.77 - 5.28 10*6/mm3    Hemoglobin 12.2 12.0 - 15.9 g/dL    Hematocrit 37.7 34.0 - 46.6 %    MCV 94.3 79.0 - 97.0 fL    MCH 30.5 26.6 - 33.0 pg    MCHC 32.4 31.5 - 35.7 g/dL    RDW 14.4 12.3 - 15.4 %    RDW-SD 49.6 37.0 - 54.0 fl    MPV 10.5 6.0 - 12.0 fL    Platelets 242 140 - 450 10*3/mm3    " Neutrophil % 72.9 42.7 - 76.0 %    Lymphocyte % 16.7 (L) 19.6 - 45.3 %    Monocyte % 5.6 5.0 - 12.0 %    Eosinophil % 4.1 0.3 - 6.2 %    Basophil % 0.4 0.0 - 1.5 %    Immature Grans % 0.3 0.0 - 0.5 %    Neutrophils, Absolute 4.94 1.70 - 7.00 10*3/mm3    Lymphocytes, Absolute 1.13 0.70 - 3.10 10*3/mm3    Monocytes, Absolute 0.38 0.10 - 0.90 10*3/mm3    Eosinophils, Absolute 0.28 0.00 - 0.40 10*3/mm3    Basophils, Absolute 0.03 0.00 - 0.20 10*3/mm3    Immature Grans, Absolute 0.02 0.00 - 0.05 10*3/mm3    nRBC 0.0 0.0 - 0.2 /100 WBC   Comprehensive Metabolic Panel    Specimen: Blood   Result Value Ref Range    Glucose 93 65 - 99 mg/dL    BUN 16 8 - 23 mg/dL    Creatinine 0.85 0.57 - 1.00 mg/dL    Sodium 140 136 - 145 mmol/L    Potassium 4.2 3.5 - 5.2 mmol/L    Chloride 105 98 - 107 mmol/L    CO2 24.8 22.0 - 29.0 mmol/L    Calcium 9.7 8.6 - 10.5 mg/dL    Total Protein 7.0 6.0 - 8.5 g/dL    Albumin 3.9 3.5 - 5.2 g/dL    ALT (SGPT) 12 1 - 33 U/L    AST (SGOT) 20 1 - 32 U/L    Alkaline Phosphatase 89 39 - 117 U/L    Total Bilirubin 0.2 0.0 - 1.2 mg/dL    Globulin 3.1 gm/dL    A/G Ratio 1.3 g/dL    BUN/Creatinine Ratio 18.8 7.0 - 25.0    Anion Gap 10.2 5.0 - 15.0 mmol/L    eGFR 74.7 >60.0 mL/min/1.73   C-reactive Protein    Specimen: Blood   Result Value Ref Range    C-Reactive Protein 0.91 (H) 0.00 - 0.50 mg/dL   Sedimentation Rate    Specimen: Blood   Result Value Ref Range    Sed Rate 27 0 - 30 mm/hr         Assessment & Plan       null     FINDINGS:  Venous duplex ultrasound left lower extremity     Comparison: None     Findings:     The visualized deep veins are fully compressible with normal Doppler color flow and spectral tracings.     1.2 cm oval-shaped lesion with internal calcification adjacent to the popliteal blood vessels.     IMPRESSION:  IMPRESSION:     Negative for left lower extremity deep vein thrombosis.     There is an oval-shaped lesion with internal calcification adjacent to the popliteal blood  vessels. Further evaluation by CT or MRI as clinically indicated.     Authenticated and Electronically Signed by Lacy Ojeda on  08/30/2024 07:13:17 PM      Ttp in politeal area. + 2 edema in left leg only.  Eval mass needed soon.      Diagnoses and all orders for this visit:    1. Knee mass, left (Primary)  -     MRI knee left w wo contrast    2. Localized edema  -     MRI knee left w wo contrast      No follow-ups on file.          There are no Patient Instructions on file for this visit.     Santo Pinon MD    Assessment & Plan       Answers submitted by the patient for this visit:  Other (Submitted on 9/6/2024)  Please describe your symptoms.: Left foot swelling  Have you had these symptoms before?: Yes  How long have you been having these symptoms?: 5-7 days  Primary Reason for Visit (Submitted on 9/6/2024)  What is the primary reason for your visit?: Other

## 2024-09-13 ENCOUNTER — HOSPITAL ENCOUNTER (OUTPATIENT)
Dept: MRI IMAGING | Facility: HOSPITAL | Age: 69
Discharge: HOME | End: 2024-09-13
Payer: MEDICARE

## 2024-09-13 PROCEDURE — 0 GADOBENATE DIMEGLUMINE 529 MG/ML SOLUTION: Performed by: INTERNAL MEDICINE

## 2024-09-13 PROCEDURE — 73723 MRI JOINT LWR EXTR W/O&W/DYE: CPT

## 2024-09-13 PROCEDURE — A9577 INJ MULTIHANCE: HCPCS | Performed by: INTERNAL MEDICINE

## 2024-09-13 RX ADMIN — GADOBENATE DIMEGLUMINE 15 ML: 529 INJECTION, SOLUTION INTRAVENOUS at 17:40

## 2024-09-16 NOTE — PROGRESS NOTES
IMPRESSION    In the popliteal fossa there is a 1.3 x 1.1 x 0.8 cm mass that  demonstrates significant low signal on fat saturation images. There is  significant peripheral enhancement postcontrast, however there does  appear be some mild enhancement internally. On the T1 images this is  hypointense to bone. This is adjacent to the neurovascular bundle in the  popliteal fossa. Differential considerations include synovial  osteochondromatosis, loose body, neoplasm not excluded. Correlation with  x-ray or CT recommended for additional characterization.    Extensive tear of the medial meniscus. Significant chondromalacia of the  medial compartment.    Tear of the anterior horn of the lateral meniscus. Mild chondromalacia  the lateral compartment.    Probable intraosseous ganglion cyst in the proximal tibia.    Small joint effusion and small Baker's cyst.      Does she have a favorite orthopedic surgeon?  I will set her up. Or she can wait to discuss at her next apt.

## 2024-11-01 ENCOUNTER — SPECIALTY PHARMACY (OUTPATIENT)
Age: 69
End: 2024-11-01
Payer: MEDICARE

## 2024-11-18 PROBLEM — Z79.899 HIGH RISK MEDICATION USE: Status: ACTIVE | Noted: 2024-11-18

## 2024-11-18 PROBLEM — D84.821 IMMUNODEFICIENCY DUE TO DRUG THERAPY: Status: ACTIVE | Noted: 2024-11-18

## 2024-11-18 PROBLEM — Z79.899 IMMUNODEFICIENCY DUE TO DRUG THERAPY: Status: ACTIVE | Noted: 2024-11-18

## 2024-11-18 PROBLEM — M85.89 OSTEOPENIA OF MULTIPLE SITES: Status: ACTIVE | Noted: 2024-11-18

## 2024-11-18 PROBLEM — M19.049 CMC ARTHRITIS: Status: ACTIVE | Noted: 2024-11-18

## 2024-11-18 NOTE — ASSESSMENT & PLAN NOTE
FIRST CMC JOINTS  Trigger of left thumb improved    Continues on p.r.n. Meloxicam   Risks of NSAIDs discussed including GI upset, GI bleeding, renal and hepatic risks and the risks of cardiovascular disease and stroke. Warned patient not to take with other NSAIDs including OTC NSAIDs.  
+CCP>250; +RF; dz start 11.2009;  works [employer] 3 days per week;   intolerant arava  *Current:  MTX 2/1/10; enbrel 8/10, meloxicam    Low to moderate disease activity.    Swollen joint count 0. Tender joint count 0.  Good prognosis    Still having really bad days at times despite increasing methotrexate to 25mg weekly in August 2024.    She has tapered off prednisone.    She is having right shoulder pain from subacromial bursa and down deltoid.  ? Bursitis.  Will give handout on bursitis treatment.  If no improvement we can try a steroid injection.    Getting Enbrel through [employer] assistance.    Continue meloxicam, Mtx and Enbrel.  Well tolerated and effective.  Refilled  Discussed need for routine labs every 8-12 weeks.  Labs reviewed and are stable.    Plan will be to continue current medication, frequent intensive lab monitoring every 8-12 weeks (CBC, CMP) for toxicity monitoring and follow up in 3 months.  
Enbrel Methotrexate  QTB and hepatitis panel negative 2/7/2024     We discussed biologic agents at length. Risks and alternatives were discussed at length and the option of no treatment was also given. We discussed risks including but not limited to infections which can be unusual, severe, and deadly. When possible, these agents should be stopped immediately if infections occur. Unusual infection such as TB and fungal infections can occur. There may be an increased risk of lymphoma with these agents. Other risks can include a multiple sclerosis-like illness and worsening of heart failure. Infusion or injection reactions which can be deadly have been reported. Studies on pregnancy have not been done so pregnancy should be avoided while on these agents. Reactivation of a deadly brain virus and hepatitis viruses have been reported. Worsening of COPD has been seen with orencia. Elevated lipids, elevation in liver functions, and dangerous changes in blood counts have been seen with certain agents. Regular monitoring will be required.  
Enbrel methotrexate    Meds well tolerated.  No sign of toxicity  Labs reviewed and are stable.  Continue labs every 8 weeks for monitoring  Risks of methotrexate include but are not limited to severe liver damage that can be fatal, the possible need for liver biopsy, bone marrow suppression that can lead to dangerously low blood counts, GI side effects including mouth sores and diarrhea, fatigue, and rare risk of severe pulmonary complications. There should be no alcohol consumed with MTX. MTX can cause severe fetal abnormalities whether the mother or father is taking the medication and thus must be avoided if pregnancy is a possibility.  All medication is to be taken one day a week only. The need for q 8-12 week labs and the need for folic acid supplementation were discussed.  
Following with [facility] Dr. Arceo     
Fosamax start December 2019  DEXA 12/19/2019 T-score left femoral neck -2, L-spine -1.2  DEXA 5/16/22  Left Fem neck -1.0 L spine -0.5 which has improved since 2019 scan    DEXA 5/16/22 reviewed with patient.   Left Fem neck -1.0 L spine -0.5 which has improved since 2019 scan.  Dexa ordered.   Continue Calcium + D OTC  Continue regular weightbearing exercise.   She is on a drug holiday.  Started this in 2022.  
based on positive minor salivary bx 7/5/16 with ENT    stable. Continue pilocarpine. Avoid diuretics.    
Yes

## 2024-11-20 ENCOUNTER — LAB (OUTPATIENT)
Facility: HOSPITAL | Age: 69
End: 2024-11-20
Payer: MEDICARE

## 2024-11-20 ENCOUNTER — OFFICE VISIT (OUTPATIENT)
Age: 69
End: 2024-11-20
Payer: MEDICARE

## 2024-11-20 VITALS
WEIGHT: 156.3 LBS | SYSTOLIC BLOOD PRESSURE: 122 MMHG | DIASTOLIC BLOOD PRESSURE: 78 MMHG | TEMPERATURE: 97.7 F | HEART RATE: 74 BPM | BODY MASS INDEX: 26.69 KG/M2 | HEIGHT: 64 IN

## 2024-11-20 DIAGNOSIS — Z79.899 IMMUNODEFICIENCY DUE TO DRUG THERAPY: ICD-10-CM

## 2024-11-20 DIAGNOSIS — M35.00 SJOGREN'S SYNDROME, WITH UNSPECIFIED ORGAN INVOLVEMENT: ICD-10-CM

## 2024-11-20 DIAGNOSIS — D84.821 IMMUNODEFICIENCY DUE TO DRUG THERAPY: ICD-10-CM

## 2024-11-20 DIAGNOSIS — Z79.899 IMMUNOSUPPRESSION DUE TO DRUG THERAPY: ICD-10-CM

## 2024-11-20 DIAGNOSIS — M05.79 RHEUMATOID ARTHRITIS INVOLVING MULTIPLE SITES WITH POSITIVE RHEUMATOID FACTOR: ICD-10-CM

## 2024-11-20 DIAGNOSIS — Z79.899 HIGH RISK MEDICATION USE: ICD-10-CM

## 2024-11-20 DIAGNOSIS — D84.821 IMMUNOSUPPRESSION DUE TO DRUG THERAPY: ICD-10-CM

## 2024-11-20 DIAGNOSIS — M85.89 OSTEOPENIA OF MULTIPLE SITES: ICD-10-CM

## 2024-11-20 DIAGNOSIS — K21.00 GASTROESOPHAGEAL REFLUX DISEASE WITH ESOPHAGITIS, UNSPECIFIED WHETHER HEMORRHAGE: ICD-10-CM

## 2024-11-20 DIAGNOSIS — M06.9 RHEUMATOID ARTHRITIS, INVOLVING UNSPECIFIED SITE, UNSPECIFIED WHETHER RHEUMATOID FACTOR PRESENT: ICD-10-CM

## 2024-11-20 DIAGNOSIS — M19.049 CMC ARTHRITIS: ICD-10-CM

## 2024-11-20 LAB
ALBUMIN SERPL-MCNC: 3.5 G/DL (ref 3.5–5.2)
ALP SERPL-CCNC: 101 U/L (ref 39–117)
ALT SERPL W P-5'-P-CCNC: 7 U/L (ref 1–33)
AST SERPL-CCNC: 15 U/L (ref 1–32)
BASOPHILS # BLD AUTO: 0.04 10*3/MM3 (ref 0–0.2)
BASOPHILS NFR BLD AUTO: 0.6 % (ref 0–1.5)
BILIRUB CONJ SERPL-MCNC: <0.2 MG/DL (ref 0–0.3)
BILIRUB INDIRECT SERPL-MCNC: NORMAL MG/DL
BILIRUB SERPL-MCNC: <0.2 MG/DL (ref 0–1.2)
CRP SERPL-MCNC: 2.03 MG/DL (ref 0–0.5)
DEPRECATED RDW RBC AUTO: 41.8 FL (ref 37–54)
EOSINOPHIL # BLD AUTO: 0.25 10*3/MM3 (ref 0–0.4)
EOSINOPHIL NFR BLD AUTO: 3.7 % (ref 0.3–6.2)
ERYTHROCYTE [DISTWIDTH] IN BLOOD BY AUTOMATED COUNT: 13.1 % (ref 12.3–15.4)
ERYTHROCYTE [SEDIMENTATION RATE] IN BLOOD: 56 MM/HR (ref 0–30)
HCT VFR BLD AUTO: 37.4 % (ref 34–46.6)
HGB BLD-MCNC: 12.3 G/DL (ref 12–15.9)
IMM GRANULOCYTES # BLD AUTO: 0.03 10*3/MM3 (ref 0–0.05)
IMM GRANULOCYTES NFR BLD AUTO: 0.4 % (ref 0–0.5)
LYMPHOCYTES # BLD AUTO: 1.29 10*3/MM3 (ref 0.7–3.1)
LYMPHOCYTES NFR BLD AUTO: 19.2 % (ref 19.6–45.3)
MCH RBC QN AUTO: 29.2 PG (ref 26.6–33)
MCHC RBC AUTO-ENTMCNC: 32.9 G/DL (ref 31.5–35.7)
MCV RBC AUTO: 88.8 FL (ref 79–97)
MONOCYTES # BLD AUTO: 0.61 10*3/MM3 (ref 0.1–0.9)
MONOCYTES NFR BLD AUTO: 9.1 % (ref 5–12)
NEUTROPHILS NFR BLD AUTO: 4.5 10*3/MM3 (ref 1.7–7)
NEUTROPHILS NFR BLD AUTO: 67 % (ref 42.7–76)
NRBC BLD AUTO-RTO: 0 /100 WBC (ref 0–0.2)
PLATELET # BLD AUTO: 260 10*3/MM3 (ref 140–450)
PMV BLD AUTO: 10.3 FL (ref 6–12)
PROT SERPL-MCNC: 7.4 G/DL (ref 6–8.5)
RBC # BLD AUTO: 4.21 10*6/MM3 (ref 3.77–5.28)
WBC NRBC COR # BLD AUTO: 6.72 10*3/MM3 (ref 3.4–10.8)

## 2024-11-20 PROCEDURE — 86140 C-REACTIVE PROTEIN: CPT

## 2024-11-20 PROCEDURE — 36415 COLL VENOUS BLD VENIPUNCTURE: CPT

## 2024-11-20 PROCEDURE — 85025 COMPLETE CBC W/AUTO DIFF WBC: CPT

## 2024-11-20 PROCEDURE — 80076 HEPATIC FUNCTION PANEL: CPT

## 2024-11-20 PROCEDURE — 84520 ASSAY OF UREA NITROGEN: CPT

## 2024-11-20 PROCEDURE — 82565 ASSAY OF CREATININE: CPT

## 2024-11-20 PROCEDURE — 85652 RBC SED RATE AUTOMATED: CPT

## 2024-11-20 RX ORDER — MELOXICAM 15 MG/1
15 TABLET ORAL DAILY PRN
Qty: 90 TABLET | Refills: 1 | Status: SHIPPED | OUTPATIENT
Start: 2024-11-20

## 2024-11-20 RX ORDER — VENLAFAXINE HYDROCHLORIDE 37.5 MG/1
CAPSULE, EXTENDED RELEASE ORAL
COMMUNITY
Start: 2024-10-23

## 2024-11-20 RX ORDER — FOLIC ACID 1 MG/1
5000 TABLET ORAL DAILY
Qty: 450 TABLET | Refills: 3 | Status: SHIPPED | OUTPATIENT
Start: 2024-11-20

## 2024-11-20 RX ORDER — METHOTREXATE 2.5 MG/1
25 TABLET ORAL WEEKLY
Qty: 120 TABLET | Refills: 0 | Status: SHIPPED | OUTPATIENT
Start: 2024-11-20

## 2024-11-20 NOTE — PROGRESS NOTES
Office Visit       Date: 2024   Patient Name: Tiffany Martinez  MRN: [id number]  YOB: 1955    Referring Physician: Santo Pinon MD     Chief Complaint:   Chief Complaint   Patient presents with    Rheumatoid Arthritis       History of Present Illness: Tiffany Martinez is a 69 y.o. female who is here today for follow-up of rheumatoid arthritis with Sjogren's syndrome overlap.  Today she reports feeling the same.  She rates her pain 8 out of 10 and has 15 minutes of morning stiffness.  We prescribed her diclofenac gel 4 times a day as needed, Enbrel weekly, folic acid 5 tablets daily, meloxicam 15 mg daily as needed, methotrexate 10 tablets weekly and pilocarpine 5 mg 3 times daily as needed for dryness.      Subjective   Review of Systems:  Positive for back pain and joint swelling.  All other review of symptoms are negative.    Past Medical History:   Past Medical History:   Diagnosis Date    Acid reflux     Arrhythmia     Colon polyp 2015    Diverticulosis 2015    Hemorrhoids, internal 2015    High risk medication use     History of torn meniscus of left knee     Hyperlipidemia     Hypertension     Immunosuppression     Osteoarthritis     Rheumatoid arthritis     Rheumatoid arthritis with rheumatoid factor of multiple sites without organ or systems involvement     Shingles 2020    Left abd/flank/back    Sjogrens syndrome     Thrush, oral        Past Surgical History:   Past Surgical History:   Procedure Laterality Date     SECTION      times 2    COLONOSCOPY W/ POLYPECTOMY  2021    COSMETIC SURGERY      ENDOSCOPY  2021    [facility]     HYSTEROSCOPY ENDOMETRIAL ABLATION  2016    D&C polypectomy [facility]    TUBAL ABDOMINAL LIGATION Bilateral        Family History:   Family History   Problem Relation Age of Onset    Heart disease Mother     Hypertension Mother     Heart attack Father     Stroke Father 79         from stroke    Heart disease  Father     Hypertension Father     Hyperlipidemia Father     No Known Problems Brother     Breast cancer Neg Hx     Ovarian cancer Neg Hx     Colon cancer Neg Hx        Social History:   Social History     Socioeconomic History    Marital status:    Tobacco Use    Smoking status: Former     Current packs/day: 0.00     Average packs/day: 0.6 packs/day for 7.0 years (4.5 ttl pk-yrs)     Types: Cigarettes     Start date: 2017     Quit date: 12/15/2022     Years since quittin.9     Passive exposure: Past    Smokeless tobacco: Never    Tobacco comments:     going to quit   Vaping Use    Vaping status: Never Used   Substance and Sexual Activity    Alcohol use: Not Currently    Drug use: No    Sexual activity: Yes     Partners: Male     Birth control/protection: Tubal ligation       Medications:   Current Outpatient Medications:     amLODIPine (NORVASC) 5 MG tablet, Take 1 tablet by mouth Daily., Disp: , Rfl:     ciclopirox (PENLAC) 8 % solution, APPLY ONCE EVERY DAY TO TOENAILS, Disp: , Rfl:     Diclofenac Sodium (VOLTAREN) 1 % gel gel, APPLY 2 GRAMS TO AFFECTED AREA 4 TIMES A DAY AS NEEDED, Disp: , Rfl:     ENBREL SURECLICK 50 MG/ML solution auto-injector, Inject 50 mg under the skin every 7 days., Disp: , Rfl:     folic acid (FOLVITE) 1 MG tablet, Take 5 tablets by mouth Daily., Disp: 450 tablet, Rfl: 3    Linzess 145 MCG capsule capsule, Take 1 capsule by mouth Daily., Disp: , Rfl:     meloxicam (MOBIC) 15 MG tablet, Take 1 tablet by mouth Daily As Needed for Mild Pain., Disp: 90 tablet, Rfl: 1    methotrexate 2.5 MG tablet, Take 10 tablets by mouth 1 (One) Time Per Week., Disp: 120 tablet, Rfl: 0    metoprolol succinate XL (TOPROL-XL) 50 MG 24 hr tablet, Take 1 tablet by mouth Daily., Disp: 30 tablet, Rfl: 9    pantoprazole (PROTONIX) 40 MG EC tablet, Take 1 tablet by mouth Daily., Disp: , Rfl:     pilocarpine (SALAGEN) 5 MG tablet, Take 1 tablet by mouth Daily., Disp: , Rfl:     rosuvastatin (CRESTOR)  "5 MG tablet, Take 1 tablet by mouth Daily., Disp: 90 tablet, Rfl: 1    Sodium Fluoride 5000 Plus 1.1 % cream, USE TO BRUSH TEETH EVERY DAY, Disp: , Rfl:     tiZANidine (Zanaflex) 4 MG tablet, Take 1 tablet by mouth At Night As Needed for Muscle Spasms., Disp: 20 tablet, Rfl: 0    venlafaxine XR (EFFEXOR-XR) 37.5 MG 24 hr capsule, take 1 capsule by mouth daily, Disp: , Rfl:     Allergies:   Allergies   Allergen Reactions    Codeine Nausea And Vomiting and Hives       I have reviewed and updated the patient's chief complaint, history of present illness, review of systems, past medical history, surgical history, family history, social history, medications and allergy list as appropriate.     Objective    Vital Signs:   Vitals:    11/20/24 0857   BP: 122/78   BP Location: Left arm   Patient Position: Sitting   Cuff Size: Adult   Pulse: 74   Temp: 97.7 °F (36.5 °C)   Weight: 70.9 kg (156 lb 4.8 oz)   Height: 162.6 cm (64.02\")   PainSc:   8     Body mass index is 26.82 kg/m².           Physical Exam:  Physical Exam  Vitals reviewed.   Constitutional:       Appearance: Normal appearance.   HENT:      Head: Normocephalic and atraumatic.      Mouth/Throat:      Mouth: Mucous membranes are moist.   Eyes:      Conjunctiva/sclera: Conjunctivae normal.   Cardiovascular:      Rate and Rhythm: Normal rate and regular rhythm.      Pulses: Normal pulses.      Heart sounds: Normal heart sounds.   Pulmonary:      Effort: Pulmonary effort is normal.      Breath sounds: Normal breath sounds.   Musculoskeletal:         General: Normal range of motion.      Cervical back: Normal range of motion and neck supple.      Comments: Mild synovitis of the left wrist.  Heberden's bilaterally  Tender right subacromial bursa.   Skin:     General: Skin is warm and dry.   Neurological:      General: No focal deficit present.      Mental Status: She is alert and oriented to person, place, and time. Mental status is at baseline.   Psychiatric:         " Mood and Affect: Mood normal.         Behavior: Behavior normal.         Thought Content: Thought content normal.         Judgment: Judgment normal.          Results Review:   Imaging Results (Last 24 Hours)       ** No results found for the last 24 hours. **            Procedures    Assessment / Plan    Assessment/Plan:   Diagnoses and all orders for this visit:    1. Rheumatoid arthritis, involving unspecified site, unspecified whether rheumatoid factor present  Assessment & Plan:  +CCP>250; +RF; dz start 11.2009;  works [employer] 3 days per week;   intolerant arava  *Current:  MTX 2/1/10; enbrel 8/10, meloxicam    Low to moderate disease activity.    Swollen joint count 0. Tender joint count 0.  Good prognosis    Still having really bad days at times despite increasing methotrexate to 25mg weekly in August 2024.    She has tapered off prednisone.    She is having right shoulder pain from subacromial bursa and down deltoid.  ? Bursitis.  Will give handout on bursitis treatment.  If no improvement we can try a steroid injection.    Getting Enbrel through [employer] assistance.    Continue meloxicam, Mtx and Enbrel.  Well tolerated and effective.  Refilled  Discussed need for routine labs every 8-12 weeks.  Labs reviewed and are stable.    Plan will be to continue current medication, frequent intensive lab monitoring every 8-12 weeks (CBC, CMP) for toxicity monitoring and follow up in 3 months.    Orders:  -     C-reactive Protein; Standing  -     Sedimentation Rate; Standing  -     Comprehensive Metabolic Panel; Standing  -     CBC With Manual Differential; Standing    2. Sjogren's syndrome, with unspecified organ involvement  Assessment & Plan:  based on positive minor salivary bx 7/5/16 with ENT    stable. Continue pilocarpine. Avoid diuretics.      Orders:  -     C-reactive Protein; Standing  -     Sedimentation Rate; Standing  -     Comprehensive Metabolic Panel; Standing  -     CBC With Manual Differential;  Standing    3. High risk medication use  Assessment & Plan:  Enbrel methotrexate    Meds well tolerated.  No sign of toxicity  Labs reviewed and are stable.  Continue labs every 8 weeks for monitoring  Risks of methotrexate include but are not limited to severe liver damage that can be fatal, the possible need for liver biopsy, bone marrow suppression that can lead to dangerously low blood counts, GI side effects including mouth sores and diarrhea, fatigue, and rare risk of severe pulmonary complications. There should be no alcohol consumed with MTX. MTX can cause severe fetal abnormalities whether the mother or father is taking the medication and thus must be avoided if pregnancy is a possibility.  All medication is to be taken one day a week only. The need for q 8-12 week labs and the need for folic acid supplementation were discussed.    Orders:  -     C-reactive Protein; Standing  -     Sedimentation Rate; Standing  -     Comprehensive Metabolic Panel; Standing  -     CBC With Manual Differential; Standing  -     methotrexate 2.5 MG tablet; Take 10 tablets by mouth 1 (One) Time Per Week.  Dispense: 120 tablet; Refill: 0    4. Immunodeficiency due to drug therapy  Assessment & Plan:  Enbrel Methotrexate  QTB and hepatitis panel negative 2/7/2024     We discussed biologic agents at length. Risks and alternatives were discussed at length and the option of no treatment was also given. We discussed risks including but not limited to infections which can be unusual, severe, and deadly. When possible, these agents should be stopped immediately if infections occur. Unusual infection such as TB and fungal infections can occur. There may be an increased risk of lymphoma with these agents. Other risks can include a multiple sclerosis-like illness and worsening of heart failure. Infusion or injection reactions which can be deadly have been reported. Studies on pregnancy have not been done so pregnancy should be avoided  while on these agents. Reactivation of a deadly brain virus and hepatitis viruses have been reported. Worsening of COPD has been seen with orencia. Elevated lipids, elevation in liver functions, and dangerous changes in blood counts have been seen with certain agents. Regular monitoring will be required.    Orders:  -     C-reactive Protein; Standing  -     Sedimentation Rate; Standing  -     Comprehensive Metabolic Panel; Standing  -     CBC With Manual Differential; Standing    5. CMC arthritis  Assessment & Plan:   FIRST CMC JOINTS  Trigger of left thumb improved    Continues on p.r.n. Meloxicam   Risks of NSAIDs discussed including GI upset, GI bleeding, renal and hepatic risks and the risks of cardiovascular disease and stroke. Warned patient not to take with other NSAIDs including OTC NSAIDs.      6. Osteopenia of multiple sites  Assessment & Plan:  Fosamax start December 2019  DEXA 12/19/2019 T-score left femoral neck -2, L-spine -1.2  DEXA 5/16/22  Left Fem neck -1.0 L spine -0.5 which has improved since 2019 scan    DEXA 5/16/22 reviewed with patient.   Left Fem neck -1.0 L spine -0.5 which has improved since 2019 scan.  Dexa ordered.   Continue Calcium + D OTC  Continue regular weightbearing exercise.   She is on a drug holiday.  Started this in 2022.    Orders:  -     DEXA Bone Density Axial; Future    7. Gastroesophageal reflux disease with esophagitis, unspecified whether hemorrhage  Assessment & Plan:  Following with [facility] Dr. Arceo         8. Rheumatoid arthritis involving multiple sites with positive rheumatoid factor  Assessment & Plan:  +CCP>250; +RF; dz start 11.2009;  works [employer] 3 days per week;   intolerant arava  *Current:  MTX 2/1/10; enbrel 8/10, meloxicam    Low to moderate disease activity.    Swollen joint count 0. Tender joint count 0.  Good prognosis    Still having really bad days at times despite increasing methotrexate to 25mg weekly in August 2024.    She has tapered  off prednisone.    She is having right shoulder pain from subacromial bursa and down deltoid.  ? Bursitis.  Will give handout on bursitis treatment.  If no improvement we can try a steroid injection.    Getting Enbrel through [employer] assistance.    Continue meloxicam, Mtx and Enbrel.  Well tolerated and effective.  Refilled  Discussed need for routine labs every 8-12 weeks.  Labs reviewed and are stable.    Plan will be to continue current medication, frequent intensive lab monitoring every 8-12 weeks (CBC, CMP) for toxicity monitoring and follow up in 3 months.    Orders:  -     methotrexate 2.5 MG tablet; Take 10 tablets by mouth 1 (One) Time Per Week.  Dispense: 120 tablet; Refill: 0    9. Immunosuppression due to drug therapy  -     C-reactive Protein; Standing  -     Sedimentation Rate; Standing  -     Comprehensive Metabolic Panel; Standing  -     CBC With Manual Differential; Standing  -     methotrexate 2.5 MG tablet; Take 10 tablets by mouth 1 (One) Time Per Week.  Dispense: 120 tablet; Refill: 0    Other orders  -     meloxicam (MOBIC) 15 MG tablet; Take 1 tablet by mouth Daily As Needed for Mild Pain.  Dispense: 90 tablet; Refill: 1  -     folic acid (FOLVITE) 1 MG tablet; Take 5 tablets by mouth Daily.  Dispense: 450 tablet; Refill: 3        Follow Up:   Return in about 3 months (around 2/20/2025) for Dr. Wong, AMBIKA Rodriguez.        AMBIKA Mast  [facility]

## 2024-11-21 LAB
BUN SERPL-MCNC: 15 MG/DL (ref 8–27)
BUN/CREAT SERPL: 20 (ref 12–28)
CREAT SERPL-MCNC: 0.76 MG/DL (ref 0.57–1)
EGFRCR SERPLBLD CKD-EPI 2021: 85 ML/MIN/1.73

## 2024-11-26 ENCOUNTER — SPECIALTY PHARMACY (OUTPATIENT)
Age: 69
End: 2024-11-26
Payer: MEDICARE

## 2024-12-20 ENCOUNTER — TELEPHONE (OUTPATIENT)
Age: 69
End: 2024-12-20
Payer: MEDICARE

## 2024-12-20 ENCOUNTER — SPECIALTY PHARMACY (OUTPATIENT)
Age: 69
End: 2024-12-20
Payer: MEDICARE

## 2024-12-20 NOTE — TELEPHONE ENCOUNTER
PA request has been approved and pharmacy notified (Filling pharmacy will be notified by phone, fax, or submitted prescription)    Authorized Medication: Enbrel SureClick 50mg/ml  Name of Insurance Approving PA: [employer]  Pharmacy PA Number: [id number]  PA Effective Dates: 12/20/24-12/31/25  Dispensing Pharmacy: [facility]

## 2024-12-20 NOTE — TELEPHONE ENCOUNTER
Prior authorization initiated by [facility].  Update will be provided when a determination has been received.     Medication: Enbrel SureClick 50mg/ml  PA Submission Method: CMM  Case Number/CMM Key: [id number]

## 2025-01-29 ENCOUNTER — APPOINTMENT (OUTPATIENT)
Dept: BONE DENSITY | Facility: HOSPITAL | Age: 70
End: 2025-01-29
Payer: MEDICARE

## 2025-01-29 ENCOUNTER — TELEPHONE (OUTPATIENT)
Age: 70
End: 2025-01-29
Payer: MEDICARE

## 2025-01-29 DIAGNOSIS — M85.89 OSTEOPENIA OF MULTIPLE SITES: ICD-10-CM

## 2025-01-29 PROCEDURE — 77080 DXA BONE DENSITY AXIAL: CPT

## 2025-01-29 NOTE — TELEPHONE ENCOUNTER
Notified Pt of DEXA results and recommendations per AMBIKA Rodriguez. Pt wants to receive a handout for Prolia and will discuss changing meds at her next apt.

## 2025-01-30 ENCOUNTER — OFFICE VISIT (OUTPATIENT)
Dept: INTERNAL MEDICINE | Facility: CLINIC | Age: 70
End: 2025-01-30
Payer: MEDICARE

## 2025-01-30 VITALS
BODY MASS INDEX: 26.8 KG/M2 | SYSTOLIC BLOOD PRESSURE: 130 MMHG | HEART RATE: 61 BPM | WEIGHT: 157 LBS | OXYGEN SATURATION: 96 % | DIASTOLIC BLOOD PRESSURE: 70 MMHG | RESPIRATION RATE: 16 BRPM | TEMPERATURE: 97.8 F | HEIGHT: 64 IN

## 2025-01-30 DIAGNOSIS — G57.01 PIRIFORMIS SYNDROME OF RIGHT SIDE: ICD-10-CM

## 2025-01-30 DIAGNOSIS — M81.0 AGE-RELATED OSTEOPOROSIS WITHOUT CURRENT PATHOLOGICAL FRACTURE: Primary | ICD-10-CM

## 2025-01-30 DIAGNOSIS — Z00.00 ROUTINE GENERAL MEDICAL EXAMINATION AT A HEALTH CARE FACILITY: ICD-10-CM

## 2025-01-30 PROCEDURE — 3078F DIAST BP <80 MM HG: CPT | Performed by: INTERNAL MEDICINE

## 2025-01-30 PROCEDURE — 1170F FXNL STATUS ASSESSED: CPT | Performed by: INTERNAL MEDICINE

## 2025-01-30 PROCEDURE — 99213 OFFICE O/P EST LOW 20 MIN: CPT | Performed by: INTERNAL MEDICINE

## 2025-01-30 PROCEDURE — G2211 COMPLEX E/M VISIT ADD ON: HCPCS | Performed by: INTERNAL MEDICINE

## 2025-01-30 PROCEDURE — 3075F SYST BP GE 130 - 139MM HG: CPT | Performed by: INTERNAL MEDICINE

## 2025-01-30 PROCEDURE — G0439 PPPS, SUBSEQ VISIT: HCPCS | Performed by: INTERNAL MEDICINE

## 2025-01-30 PROCEDURE — 1126F AMNT PAIN NOTED NONE PRSNT: CPT | Performed by: INTERNAL MEDICINE

## 2025-01-30 RX ORDER — OMEPRAZOLE 40 MG/1
40 CAPSULE, DELAYED RELEASE ORAL DAILY
COMMUNITY
Start: 2025-01-01

## 2025-01-30 RX ORDER — ERYTHROMYCIN 5 MG/G
OINTMENT OPHTHALMIC ONCE
COMMUNITY
Start: 2025-01-09

## 2025-01-30 NOTE — PROGRESS NOTES
Subjective   The ABCs of the Annual Wellness Visit  Medicare Wellness Visit      Tiffany Martinez is a 69 y.o. patient who presents for a Medicare Wellness Visit.    The following portions of the patient's history were reviewed and   updated as appropriate: allergies, current medications, past family history, past medical history, past social history, past surgical history, and problem list.    Compared to one year ago, the patient's physical   health is the same.  Compared to one year ago, the patient's mental   health is the same.    Recent Hospitalizations:  She was not admitted to the hospital during the last year.     Current Medical Providers:  Patient Care Team:  Santo Pinon MD as PCP - General (Internal Medicine)  Renan Wong MD as Consulting Physician (Rheumatology)  Fer Cruz MD as Gynecologist (Gynecology)  Osbaldo Coronel APRN as Nurse Practitioner (Rheumatology)  Efrain Burt MD as Consulting Physician (Internal Medicine)  Asha Arthur APRN as Nurse Practitioner (Nurse Practitioner)    Outpatient Medications Prior to Visit   Medication Sig Dispense Refill    amLODIPine (NORVASC) 5 MG tablet Take 1 tablet by mouth Daily.      ciclopirox (PENLAC) 8 % solution APPLY ONCE EVERY DAY TO TOENAILS      Diclofenac Sodium (VOLTAREN) 1 % gel gel APPLY 2 GRAMS TO AFFECTED AREA 4 TIMES A DAY AS NEEDED      ENBREL SURECLICK 50 MG/ML solution auto-injector Inject 50 mg under the skin every 7 days.      erythromycin (ROMYCIN) 5 MG/GM ophthalmic ointment Administer  to both eyes 1 (One) Time.      folic acid (FOLVITE) 1 MG tablet Take 5 tablets by mouth Daily. 450 tablet 3    Linzess 145 MCG capsule capsule Take 1 capsule by mouth Daily.      meloxicam (MOBIC) 15 MG tablet Take 1 tablet by mouth Daily As Needed for Mild Pain. 90 tablet 1    methotrexate 2.5 MG tablet Take 10 tablets by mouth 1 (One) Time Per Week. 120 tablet 0    metoprolol succinate XL (TOPROL-XL) 50 MG 24 hr  tablet Take 1 tablet by mouth Daily. 30 tablet 9    omeprazole (priLOSEC) 40 MG capsule Take 1 capsule by mouth Daily.      pantoprazole (PROTONIX) 40 MG EC tablet Take 1 tablet by mouth Daily.      pilocarpine (SALAGEN) 5 MG tablet Take 1 tablet by mouth Daily.      rosuvastatin (CRESTOR) 5 MG tablet Take 1 tablet by mouth Daily. 90 tablet 1    Sodium Fluoride 5000 Plus 1.1 % cream USE TO BRUSH TEETH EVERY DAY      tiZANidine (Zanaflex) 4 MG tablet Take 1 tablet by mouth At Night As Needed for Muscle Spasms. 20 tablet 0    venlafaxine XR (EFFEXOR-XR) 37.5 MG 24 hr capsule take 1 capsule by mouth daily       No facility-administered medications prior to visit.     No opioid medication identified on active medication list. I have reviewed chart for other potential  high risk medication/s and harmful drug interactions in the elderly.      Aspirin is not on active medication list.  Aspirin use is not indicated based on review of current medical condition/s. Risk of harm outweighs potential benefits.  .    Patient Active Problem List   Diagnosis    EMILIA (stress urinary incontinence, female)    Depression    Rheumatoid arthritis    Colon polyp/diverticulosis 2015    Osteopenia left femoral neck/spine December 2019    Right ovarian cyst    Acid reflux    Hypertension    Hyperlipidemia    Hemorrhoids, internal    Diverticulosis    Shingles    Arrhythmia    Pain in left knee    Allergic rhinitis    Dysphagia    Gastro-esophageal reflux disease with esophagitis    Glossodynia    Hypothyroidism    Idiopathic osteoarthritis    Osteochondritis dissecans    Tear of medial meniscus of knee    Xerostomia    Hyperlipidemia    Essential hypertension    Rheumatoid arthritis    Sjogren's syndrome    Herpes zoster    Hot flashes    DVT femoral (deep venous thrombosis) with thrombophlebitis, left    Abnormal finding of blood chemistry, unspecified    BLAIR (obstructive sleep apnea)    Candidiasis    Disorder of upper respiratory system     "Knee joint effusion    Allergic rhinitis    Dysphagia    Hypertensive disorder    Gastro-esophageal reflux disease with esophagitis    Glossodynia    Hyperlipidemia    Idiopathic osteoarthritis    Osteochondritis dissecans    Rheumatoid arthritis of knee    Sjogren's syndrome    Tear of medial meniscus of knee    Disturbance of salivary secretion    Xerostomia    Rheumatoid arthritis with rheumatoid factor of multiple sites without organ or systems involvement    High risk medication use    Immunodeficiency due to drug therapy    CMC arthritis    Osteopenia of multiple sites     Advance Care Planning Advance Directive is not on file.  ACP discussion was held with the patient during this visit. Patient does not have an advance directive, declines further assistance.            Objective   Vitals:    25 0805   BP: 130/70   Pulse: 61   Resp: 16   Temp: 97.8 °F (36.6 °C)   SpO2: 96%   Weight: 71.2 kg (157 lb)   Height: 162.6 cm (64.02\")   PainSc: 0-No pain       Estimated body mass index is 26.93 kg/m² as calculated from the following:    Height as of this encounter: 162.6 cm (64.02\").    Weight as of this encounter: 71.2 kg (157 lb).       Gait is good.        Does the patient have evidence of cognitive impairment? Yes                                                                                                Health  Risk Assessment    Smoking Status:  Social History     Tobacco Use   Smoking Status Former    Current packs/day: 0.00    Average packs/day: 0.6 packs/day for 7.0 years (4.5 ttl pk-yrs)    Types: Cigarettes    Start date: 2017    Quit date: 12/15/2022    Years since quittin.1    Passive exposure: Past   Smokeless Tobacco Never   Tobacco Comments    going to quit     Alcohol Consumption:  Social History     Substance and Sexual Activity   Alcohol Use Not Currently       Fall Risk Screen  STEADI Fall Risk Assessment was completed, and patient is at MODERATE risk for falls. Assessment " completed on:2025    Depression Screening   Little interest or pleasure in doing things? Not at all   Feeling down, depressed, or hopeless? Not at all   PHQ-2 Total Score 0      Health Habits and Functional and Cognitive Screenin/30/2025     8:07 AM   Functional & Cognitive Status   Do you have difficulty preparing food and eating? No   Do you have difficulty bathing yourself, getting dressed or grooming yourself? Yes   Do you have difficulty using the toilet? No   Do you have difficulty moving around from place to place? No   Do you have trouble with steps or getting out of a bed or a chair? Yes   Current Diet Well Balanced Diet   Dental Exam Up to date   Eye Exam Up to date   Exercise (times per week) 0 times per week   Current Exercises Include No Regular Exercise   Do you need help using the phone?  No   Are you deaf or do you have serious difficulty hearing?  No   Do you need help to go to places out of walking distance? No   Do you need help shopping? No   Do you need help preparing meals?  No   Do you need help with housework?  No   Do you need help with laundry? No   Do you need help taking your medications? No   Do you need help managing money? No   Do you ever drive or ride in a car without wearing a seat belt? No   Have you felt unusual stress, anger or loneliness in the last month? No   Who do you live with? Spouse   If you need help, do you have trouble finding someone available to you? No   Have you been bothered in the last four weeks by sexual problems? No   Do you have difficulty concentrating, remembering or making decisions? No           Age-appropriate Screening Schedule:  Refer to the list below for future screening recommendations based on patient's age, sex and/or medical conditions. Orders for these recommended tests are listed in the plan section. The patient has been provided with a written plan.    Health Maintenance List  Health Maintenance   Topic Date Due    HEPATITIS C  "SCREENING  Never done    Pneumococcal Vaccine 65+ (3 of 3 - PPSV23 or PCV20) 01/12/2022    LIPID PANEL  07/21/2024    COLORECTAL CANCER SCREENING  03/09/2025    TDAP/TD VACCINES (2 - Td or Tdap) 01/30/2025 (Originally 8/13/2022)    COVID-19 Vaccine (6 - 2024-25 season) 02/01/2025 (Originally 9/1/2024)    INFLUENZA VACCINE  03/31/2025 (Originally 7/1/2024)    BMI FOLLOWUP  08/30/2025    ANNUAL WELLNESS VISIT  01/30/2026    MAMMOGRAM  05/13/2026    DXA SCAN  01/29/2027    ZOSTER VACCINE  Completed    PAP SMEAR  Discontinued                                                                                                                                                CMS Preventative Services Quick Reference  Risk Factors Identified During Encounter  Fall Risk-High or Moderate: Discussed Fall Prevention in the home    The above risks/problems have been discussed with the patient.  Pertinent information has been shared with the patient in the After Visit Summary.  An After Visit Summary and PPPS were made available to the patient.    Follow Up:   Next Medicare Wellness visit to be scheduled in 1 year.         Additional E&M Note during same encounter follows:  Patient has additional, significant, and separately identifiable condition(s)/problem(s) that require work above and beyond the Medicare Wellness Visit     Chief Complaint  Medicare Wellness-subsequent    Subjective   HPI  Tiffany is also being seen today for an annual adult preventative physical exam.                 Objective   Vital Signs:  /70   Pulse 61   Temp 97.8 °F (36.6 °C)   Resp 16   Ht 162.6 cm (64.02\")   Wt 71.2 kg (157 lb)   SpO2 96%   BMI 26.93 kg/m²   Physical Exam    The following data was reviewed by: Santo Pinon MD on 01/30/2025:  Data reviewed : cmp            Assessment and Plan Additional age appropriate preventative wellness advice topics were discussed during today's preventative wellness exam(some topics already addressed " during AWV portion of the note above):   Nutrition: Discussed nutrition plan with patient. Information shared in after visit summary. Goal is for a well balanced diet to enhance overall health.           Age-related osteoporosis without current pathological fracture         Routine general medical examination at a health care facility         Piriformis syndrome of right side               I spent 12   minutes caring for Tiffany on this date of service. This time includes time spent by me in the following activities:preparing for the visit, reviewing tests, and obtaining and/or reviewing a separately obtained history  Follow Up   Return in about 6 months (around 7/30/2025).  Patient was given instructions and counseling regarding her condition or for health maintenance advice. Please see specific information pulled into the AVS if appropriate.

## 2025-01-30 NOTE — PROGRESS NOTES
Subjective     Patient ID: Tiffany Martinez is a 69 y.o. female. Patient is here for management of multiple medical problems.     Chief Complaint   Patient presents with    Medicare Wellness-subsequent     History of Present Illness   M/c      The following portions of the patient's history were reviewed and updated as appropriate: allergies, current medications, past family history, past medical history, past social history, past surgical history and problem list.    Review of Systems    Current Outpatient Medications:     amLODIPine (NORVASC) 5 MG tablet, Take 1 tablet by mouth Daily., Disp: , Rfl:     ciclopirox (PENLAC) 8 % solution, APPLY ONCE EVERY DAY TO TOENAILS, Disp: , Rfl:     Diclofenac Sodium (VOLTAREN) 1 % gel gel, APPLY 2 GRAMS TO AFFECTED AREA 4 TIMES A DAY AS NEEDED, Disp: , Rfl:     ENBREL SURECLICK 50 MG/ML solution auto-injector, Inject 50 mg under the skin every 7 days., Disp: , Rfl:     erythromycin (ROMYCIN) 5 MG/GM ophthalmic ointment, Administer  to both eyes 1 (One) Time., Disp: , Rfl:     folic acid (FOLVITE) 1 MG tablet, Take 5 tablets by mouth Daily., Disp: 450 tablet, Rfl: 3    Linzess 145 MCG capsule capsule, Take 1 capsule by mouth Daily., Disp: , Rfl:     meloxicam (MOBIC) 15 MG tablet, Take 1 tablet by mouth Daily As Needed for Mild Pain., Disp: 90 tablet, Rfl: 1    methotrexate 2.5 MG tablet, Take 10 tablets by mouth 1 (One) Time Per Week., Disp: 120 tablet, Rfl: 0    metoprolol succinate XL (TOPROL-XL) 50 MG 24 hr tablet, Take 1 tablet by mouth Daily., Disp: 30 tablet, Rfl: 9    omeprazole (priLOSEC) 40 MG capsule, Take 1 capsule by mouth Daily., Disp: , Rfl:     pantoprazole (PROTONIX) 40 MG EC tablet, Take 1 tablet by mouth Daily., Disp: , Rfl:     pilocarpine (SALAGEN) 5 MG tablet, Take 1 tablet by mouth Daily., Disp: , Rfl:     rosuvastatin (CRESTOR) 5 MG tablet, Take 1 tablet by mouth Daily., Disp: 90 tablet, Rfl: 1    Sodium Fluoride 5000 Plus 1.1 % cream, USE TO BRUSH TEETH  "EVERY DAY, Disp: , Rfl:     tiZANidine (Zanaflex) 4 MG tablet, Take 1 tablet by mouth At Night As Needed for Muscle Spasms., Disp: 20 tablet, Rfl: 0    venlafaxine XR (EFFEXOR-XR) 37.5 MG 24 hr capsule, take 1 capsule by mouth daily, Disp: , Rfl:     Objective      Blood pressure 130/70, pulse 61, temperature 97.8 °F (36.6 °C), resp. rate 16, height 162.6 cm (64.02\"), weight 71.2 kg (157 lb), SpO2 96%, not currently breastfeeding.            Physical Exam     General Appearance:    Alert, cooperative, no distress, appears stated age   Head:    Normocephalic, without obvious abnormality, atraumatic   Eyes:    PERRL, conjunctiva/corneas clear, EOM's intact   Ears:    Normal TM's and external ear canals, both ears   Nose:   Nares normal, septum midline, mucosa normal, no drainage   or sinus tenderness   Throat:   Lips, mucosa, and tongue normal; teeth and gums normal   Neck:   Supple, symmetrical, trachea midline, no adenopathy;        thyroid:  No enlargement/tenderness/nodules; no carotid    bruit or JVD   Back:     Symmetric, no curvature, ROM normal, no CVA tenderness   Lungs:     Clear to auscultation bilaterally, respirations unlabored   Chest wall:    No tenderness or deformity   Heart:    Regular rate and rhythm, S1 and S2 normal, no murmur,        rub or gallop   Abdomen:     Soft, non-tender, bowel sounds active all four quadrants,     no masses, no organomegaly   Extremities:   Extremities normal, atraumatic, no cyanosis or edema   Pulses:   2+ and symmetric all extremities   Skin:   Skin color, texture, turgor normal, no rashes or lesions   Lymph nodes:   Cervical, supraclavicular, and axillary nodes normal   Neurologic:   CNII-XII intact. Normal strength, sensation and reflexes       throughout      Results for orders placed or performed in visit on 11/20/24   BUN+Creat ([facility])    Collection Time: 11/20/24  9:23 AM    Specimen: Blood   Result Value Ref Range    BUN 15 8 - 27 mg/dL    Creatinine 0.76 0.57 " - 1.00 mg/dL    EGFR Result 85 >59 mL/min/1.73    BUN/Creatinine Ratio 20 12 - 28   CBC Auto Differential    Collection Time: 11/20/24  9:23 AM    Specimen: Blood   Result Value Ref Range    WBC 6.72 3.40 - 10.80 10*3/mm3    RBC 4.21 3.77 - 5.28 10*6/mm3    Hemoglobin 12.3 12.0 - 15.9 g/dL    Hematocrit 37.4 34.0 - 46.6 %    MCV 88.8 79.0 - 97.0 fL    MCH 29.2 26.6 - 33.0 pg    MCHC 32.9 31.5 - 35.7 g/dL    RDW 13.1 12.3 - 15.4 %    RDW-SD 41.8 37.0 - 54.0 fl    MPV 10.3 6.0 - 12.0 fL    Platelets 260 140 - 450 10*3/mm3    Neutrophil % 67.0 42.7 - 76.0 %    Lymphocyte % 19.2 (L) 19.6 - 45.3 %    Monocyte % 9.1 5.0 - 12.0 %    Eosinophil % 3.7 0.3 - 6.2 %    Basophil % 0.6 0.0 - 1.5 %    Immature Grans % 0.4 0.0 - 0.5 %    Neutrophils, Absolute 4.50 1.70 - 7.00 10*3/mm3    Lymphocytes, Absolute 1.29 0.70 - 3.10 10*3/mm3    Monocytes, Absolute 0.61 0.10 - 0.90 10*3/mm3    Eosinophils, Absolute 0.25 0.00 - 0.40 10*3/mm3    Basophils, Absolute 0.04 0.00 - 0.20 10*3/mm3    Immature Grans, Absolute 0.03 0.00 - 0.05 10*3/mm3    nRBC 0.0 0.0 - 0.2 /100 WBC   C-reactive Protein    Collection Time: 11/20/24  9:23 AM    Specimen: Blood   Result Value Ref Range    C-Reactive Protein 2.03 (H) 0.00 - 0.50 mg/dL   Hepatic Function Panel    Collection Time: 11/20/24  9:23 AM    Specimen: Blood   Result Value Ref Range    Total Protein 7.4 6.0 - 8.5 g/dL    Albumin 3.5 3.5 - 5.2 g/dL    ALT (SGPT) 7 1 - 33 U/L    AST (SGOT) 15 1 - 32 U/L    Alkaline Phosphatase 101 39 - 117 U/L    Total Bilirubin <0.2 0.0 - 1.2 mg/dL    Bilirubin, Direct <0.2 0.0 - 0.3 mg/dL    Bilirubin, Indirect     Sedimentation Rate    Collection Time: 11/20/24  9:23 AM    Specimen: Blood   Result Value Ref Range    Sed Rate 56 (H) 0 - 30 mm/hr         Assessment & Plan       Osteoporosis. Dx. Will start jump squats (minimal jump).  Squats 25-50 a day for strength and stability.      Exercise discussed in details.     Followed by Dr Urrutia  Marvin.      Stretching exercises demonstrated and taught.       Diagnoses and all orders for this visit:    1. Age-related osteoporosis without current pathological fracture (Primary)    2. Routine general medical examination at a health care facility    3. Piriformis syndrome of right side      Return in about 6 months (around 7/30/2025).          There are no Patient Instructions on file for this visit.     Santo Pinon MD    Assessment & Plan

## 2025-02-18 RX ORDER — PILOCARPINE HYDROCHLORIDE 5 MG/1
TABLET, FILM COATED ORAL
Qty: 270 TABLET | Refills: 0 | Status: SHIPPED | OUTPATIENT
Start: 2025-02-18

## 2025-03-25 ENCOUNTER — LAB (OUTPATIENT)
Facility: HOSPITAL | Age: 70
End: 2025-03-25
Payer: MEDICARE

## 2025-03-25 ENCOUNTER — OFFICE VISIT (OUTPATIENT)
Age: 70
End: 2025-03-25
Payer: MEDICARE

## 2025-03-25 ENCOUNTER — TELEPHONE (OUTPATIENT)
Age: 70
End: 2025-03-25

## 2025-03-25 VITALS
SYSTOLIC BLOOD PRESSURE: 126 MMHG | TEMPERATURE: 97 F | HEART RATE: 57 BPM | WEIGHT: 159.7 LBS | HEIGHT: 64 IN | BODY MASS INDEX: 27.26 KG/M2 | DIASTOLIC BLOOD PRESSURE: 78 MMHG

## 2025-03-25 DIAGNOSIS — D64.9 ANEMIA, UNSPECIFIED TYPE: ICD-10-CM

## 2025-03-25 DIAGNOSIS — M35.00 SJOGREN'S SYNDROME, WITH UNSPECIFIED ORGAN INVOLVEMENT: ICD-10-CM

## 2025-03-25 DIAGNOSIS — M06.9 RHEUMATOID ARTHRITIS, INVOLVING UNSPECIFIED SITE, UNSPECIFIED WHETHER RHEUMATOID FACTOR PRESENT: ICD-10-CM

## 2025-03-25 DIAGNOSIS — D84.821 IMMUNODEFICIENCY DUE TO DRUG THERAPY: ICD-10-CM

## 2025-03-25 DIAGNOSIS — Z79.899 HIGH RISK MEDICATION USE: ICD-10-CM

## 2025-03-25 DIAGNOSIS — Z79.899 IMMUNODEFICIENCY DUE TO DRUG THERAPY: ICD-10-CM

## 2025-03-25 DIAGNOSIS — R20.2 NUMBNESS AND TINGLING IN LEFT HAND: ICD-10-CM

## 2025-03-25 DIAGNOSIS — M81.0 OSTEOPOROSIS, UNSPECIFIED OSTEOPOROSIS TYPE, UNSPECIFIED PATHOLOGICAL FRACTURE PRESENCE: Primary | ICD-10-CM

## 2025-03-25 DIAGNOSIS — R20.0 NUMBNESS AND TINGLING IN LEFT HAND: ICD-10-CM

## 2025-03-25 DIAGNOSIS — E55.9 VITAMIN D DEFICIENCY: ICD-10-CM

## 2025-03-25 DIAGNOSIS — M06.9 RHEUMATOID ARTHRITIS, INVOLVING UNSPECIFIED SITE, UNSPECIFIED WHETHER RHEUMATOID FACTOR PRESENT: Primary | ICD-10-CM

## 2025-03-25 LAB
25(OH)D3 SERPL-MCNC: 26.3 NG/ML (ref 30–100)
ALBUMIN SERPL-MCNC: 3.8 G/DL (ref 3.5–5.2)
ALBUMIN/GLOB SERPL: 1 G/DL
ALP SERPL-CCNC: 102 U/L (ref 39–117)
ALT SERPL W P-5'-P-CCNC: 11 U/L (ref 1–33)
ANION GAP SERPL CALCULATED.3IONS-SCNC: 12 MMOL/L (ref 5–15)
AST SERPL-CCNC: 22 U/L (ref 1–32)
BILIRUB SERPL-MCNC: 0.2 MG/DL (ref 0–1.2)
BUN SERPL-MCNC: 15 MG/DL (ref 8–23)
BUN/CREAT SERPL: 20.8 (ref 7–25)
CALCIUM SPEC-SCNC: 9.4 MG/DL (ref 8.6–10.5)
CHLORIDE SERPL-SCNC: 103 MMOL/L (ref 98–107)
CO2 SERPL-SCNC: 25 MMOL/L (ref 22–29)
CREAT SERPL-MCNC: 0.72 MG/DL (ref 0.57–1)
CRP SERPL-MCNC: 3.98 MG/DL (ref 0–0.5)
EGFRCR SERPLBLD CKD-EPI 2021: 91.2 ML/MIN/1.73
GLOBULIN UR ELPH-MCNC: 3.8 GM/DL
GLUCOSE SERPL-MCNC: 84 MG/DL (ref 65–99)
IRON 24H UR-MRATE: 66 MCG/DL (ref 37–145)
IRON SATN MFR SERPL: 18 % (ref 20–50)
POTASSIUM SERPL-SCNC: 4.2 MMOL/L (ref 3.5–5.2)
PROT SERPL-MCNC: 7.6 G/DL (ref 6–8.5)
SODIUM SERPL-SCNC: 140 MMOL/L (ref 136–145)
TIBC SERPL-MCNC: 370 MCG/DL (ref 298–536)
TRANSFERRIN SERPL-MCNC: 248 MG/DL (ref 200–360)
TSH SERPL DL<=0.05 MIU/L-ACNC: 2.77 UIU/ML (ref 0.27–4.2)
VIT B12 BLD-MCNC: 616 PG/ML (ref 211–946)

## 2025-03-25 PROCEDURE — 82728 ASSAY OF FERRITIN: CPT

## 2025-03-25 PROCEDURE — 82306 VITAMIN D 25 HYDROXY: CPT

## 2025-03-25 PROCEDURE — 1160F RVW MEDS BY RX/DR IN RCRD: CPT | Performed by: INTERNAL MEDICINE

## 2025-03-25 PROCEDURE — 85652 RBC SED RATE AUTOMATED: CPT

## 2025-03-25 PROCEDURE — 85007 BL SMEAR W/DIFF WBC COUNT: CPT

## 2025-03-25 PROCEDURE — 82607 VITAMIN B-12: CPT

## 2025-03-25 PROCEDURE — 36415 COLL VENOUS BLD VENIPUNCTURE: CPT

## 2025-03-25 PROCEDURE — 83540 ASSAY OF IRON: CPT

## 2025-03-25 PROCEDURE — 84466 ASSAY OF TRANSFERRIN: CPT

## 2025-03-25 PROCEDURE — 3074F SYST BP LT 130 MM HG: CPT | Performed by: INTERNAL MEDICINE

## 2025-03-25 PROCEDURE — G2211 COMPLEX E/M VISIT ADD ON: HCPCS | Performed by: INTERNAL MEDICINE

## 2025-03-25 PROCEDURE — 86140 C-REACTIVE PROTEIN: CPT

## 2025-03-25 PROCEDURE — 3078F DIAST BP <80 MM HG: CPT | Performed by: INTERNAL MEDICINE

## 2025-03-25 PROCEDURE — 85025 COMPLETE CBC W/AUTO DIFF WBC: CPT

## 2025-03-25 PROCEDURE — 84443 ASSAY THYROID STIM HORMONE: CPT

## 2025-03-25 PROCEDURE — 1159F MED LIST DOCD IN RCRD: CPT | Performed by: INTERNAL MEDICINE

## 2025-03-25 PROCEDURE — 80053 COMPREHEN METABOLIC PANEL: CPT

## 2025-03-25 PROCEDURE — 99214 OFFICE O/P EST MOD 30 MIN: CPT | Performed by: INTERNAL MEDICINE

## 2025-03-25 RX ORDER — CHLORHEXIDINE GLUCONATE ORAL RINSE 1.2 MG/ML
SOLUTION DENTAL
COMMUNITY
Start: 2025-03-12

## 2025-03-25 RX ORDER — HYDROXYCHLOROQUINE SULFATE 200 MG/1
200 TABLET, FILM COATED ORAL 2 TIMES DAILY
Qty: 60 TABLET | Refills: 5 | Status: SHIPPED | OUTPATIENT
Start: 2025-03-25

## 2025-03-25 NOTE — PROGRESS NOTES
Office Follow Up      Date: 03/25/2025   Patient Name: Tiffany Martinez  MRN: [id number]  YOB: 1955    Referring Physician: Santo Pinon MD     Chief Complaint:   Chief Complaint   Patient presents with    Rheumatoid Arthritis       History of Present Illness: Tiffany Martinez is a 69 y.o. female who is here today for follow up on rheumatoid arthritis with Sjogren's syndrome overlap, osteoporosis.      Today she reports feeling worse with frequent numbness and tingling and pain left arm.  She reports frequent flareups in her joints.  She does not feel the rheumatoid is controlled.    She had tooth extraction a few weeks ago  She wants to consider starting Prolia for her osteoporosis.  .  We prescribed her diclofenac gel 4 times a day as needed, Enbrel weekly, folic acid, meloxicam 15 mg daily as needed, methotrexate 25 mg once weekly and pilocarpine  as needed for dryness.    History of Present Illness        Subjective       Review of Systems: Review of Systems   Constitutional:  Negative for chills, fatigue, fever and unexpected weight loss.   HENT:  Negative for mouth sores, sinus pressure and sore throat.    Eyes:  Negative for pain and redness.   Respiratory:  Negative for cough and shortness of breath.    Cardiovascular:  Negative for chest pain.   Gastrointestinal:  Negative for abdominal pain, blood in stool, diarrhea, nausea, vomiting and GERD.   Endocrine: Negative for polydipsia and polyuria.   Genitourinary:  Negative for dysuria, genital sores and hematuria.   Musculoskeletal:  Positive for arthralgias, back pain, joint swelling, myalgias, neck pain and neck stiffness.   Skin:  Negative for rash and bruise.   Neurological:  Positive for numbness. Negative for seizures, weakness and memory problem.   Hematological:  Negative for adenopathy. Does not bruise/bleed easily.   Psychiatric/Behavioral:  Negative for depressed mood. The patient is not nervous/anxious.          Past Medical History:   Past Medical History:   Diagnosis Date    Acid reflux     Arrhythmia     Colon polyp 2015    Diverticulosis 2015    Hemorrhoids, internal 2015    High risk medication use     History of torn meniscus of left knee     Hyperlipidemia     Hypertension     Immunosuppression     Osteoarthritis     Rheumatoid arthritis     Rheumatoid arthritis with rheumatoid factor of multiple sites without organ or systems involvement     Shingles 2020    Left abd/flank/back    Sjogrens syndrome     Thrush, oral        Past Surgical History:   Past Surgical History:   Procedure Laterality Date     SECTION      times 2    COLONOSCOPY W/ POLYPECTOMY  2021    COSMETIC SURGERY      ENDOSCOPY  2021    [facility]     HYSTEROSCOPY ENDOMETRIAL ABLATION  2016    D&C polypectomy [facility]    TUBAL ABDOMINAL LIGATION Bilateral        Family History:   Family History   Problem Relation Age of Onset    Heart disease Mother     Hypertension Mother     Heart attack Father     Stroke Father 79         from stroke    Heart disease Father     Hypertension Father     Hyperlipidemia Father     No Known Problems Brother     Breast cancer Neg Hx     Ovarian cancer Neg Hx     Colon cancer Neg Hx        Social History:   Social History     Socioeconomic History    Marital status:    Tobacco Use    Smoking status: Former     Current packs/day: 0.00     Average packs/day: 0.6 packs/day for 7.0 years (4.5 ttl pk-yrs)     Types: Cigarettes     Start date: 2017     Quit date: 12/15/2022     Years since quittin.2     Passive exposure: Past    Smokeless tobacco: Never    Tobacco comments:     going to quit   Vaping Use    Vaping status: Never Used   Substance and Sexual Activity    Alcohol use: Not Currently    Drug use: No    Sexual activity: Yes     Partners: Male     Birth control/protection: Tubal ligation       Medications:   Current Outpatient Medications:     amLODIPine  (NORVASC) 5 MG tablet, Take 1 tablet by mouth Daily., Disp: , Rfl:     chlorhexidine (PERIDEX) 0.12 % solution, , Disp: , Rfl:     ciclopirox (PENLAC) 8 % solution, APPLY ONCE EVERY DAY TO TOENAILS, Disp: , Rfl:     Diclofenac Sodium (VOLTAREN) 1 % gel gel, Apply  topically to the appropriate area as directed 4 (Four) Times a Day As Needed (joint pain)., Disp: 100 g, Rfl: 5    ENBREL SURECLICK 50 MG/ML solution auto-injector, Inject 50 mg under the skin every 7 days., Disp: , Rfl:     folic acid (FOLVITE) 1 MG tablet, Take 5 tablets by mouth Daily., Disp: 450 tablet, Rfl: 3    Linzess 145 MCG capsule capsule, Take 1 capsule by mouth Daily., Disp: , Rfl:     meloxicam (MOBIC) 15 MG tablet, Take 1 tablet by mouth Daily As Needed for Mild Pain., Disp: 90 tablet, Rfl: 1    methotrexate 2.5 MG tablet, Take 10 tablets by mouth 1 (One) Time Per Week., Disp: 120 tablet, Rfl: 0    metoprolol succinate XL (TOPROL-XL) 50 MG 24 hr tablet, Take 1 tablet by mouth Daily., Disp: 30 tablet, Rfl: 9    omeprazole (priLOSEC) 40 MG capsule, Take 1 capsule by mouth Daily., Disp: , Rfl:     pantoprazole (PROTONIX) 40 MG EC tablet, Take 1 tablet by mouth Daily., Disp: , Rfl:     pilocarpine (SALAGEN) 5 MG tablet, TAKE 1 TABLET BY MOUTH THREE TIMES DAILY AS NEEDED FOR DRYNESS, Disp: 270 tablet, Rfl: 0    rosuvastatin (CRESTOR) 5 MG tablet, Take 1 tablet by mouth Daily., Disp: 90 tablet, Rfl: 1    Sodium Fluoride 5000 Plus 1.1 % cream, USE TO BRUSH TEETH EVERY DAY, Disp: , Rfl:     venlafaxine XR (EFFEXOR-XR) 37.5 MG 24 hr capsule, take 1 capsule by mouth daily, Disp: , Rfl:     hydroxychloroquine (PLAQUENIL) 200 MG tablet, Take 1 tablet by mouth 2 (Two) Times a Day., Disp: 60 tablet, Rfl: 5    Allergies:   Allergies   Allergen Reactions    Codeine Nausea And Vomiting and Hives           Objective        Vital Signs:   Vitals:    03/25/25 1026   BP: 126/78   BP Location: Left arm   Patient Position: Sitting   Cuff Size: Adult   Pulse: 57  "  Temp: 97 °F (36.1 °C)   Weight: 72.4 kg (159 lb 11.2 oz)   Height: 162.6 cm (64.02\")   PainSc: 10-Worst pain ever     Body mass index is 27.4 kg/m².      Physical Exam:  Physical Exam   MUSCULOSKELETAL:   No peripheral synovitis.  No dactylitis.  No pitting of the nails.    Complete joint exam was performed including the MCPs, PIPs, DIPs of the hands, wrists, elbows, shoulders, hips, knees and ankles.  No soft tissue swelling or tenderness is present except as above.    General: The patient is well-developed and well nourished. Cooperative, alert and oriented. Affect is normal. Hydration appears normal.   HEENT: Normocephalic and atraumatic. Lids and conjunctiva are normal. Pupils are equal and sclera are clear. Oropharynx is clear   NECK neck is supple without adenopathy, masses or thyromegaly.   CARDIOVASCULAR: Regular rate and rhythm. No murmurs, rubs or gallops   LUNGS: Effort is normal. Lungs are clear bilateral   ABDOMEN: Not examined  EXTREMITIES: Peripheral pulses are intact. No clubbing.   SKIN: No rashes. No subcutaneous nodules. No digital ulcers. No sclerodactyly.   NEUROLOGIC: Gait is normal. Strength testing is normal.  No focal neurologic deficits    Results Review:   Labs:   Lab Results   Component Value Date    GLUCOSE 93 08/14/2024    BUN 15 11/20/2024    CREATININE 0.76 11/20/2024    EGFR 85 11/20/2024    BCR 20 11/20/2024    K 4.2 08/14/2024    CO2 24.8 08/14/2024    CALCIUM 9.7 08/14/2024    ALBUMIN 3.5 11/20/2024    BILITOT <0.2 11/20/2024    AST 15 11/20/2024    ALT 7 11/20/2024     Lab Results   Component Value Date    WBC 6.72 11/20/2024    HGB 12.3 11/20/2024    HCT 37.4 11/20/2024    MCV 88.8 11/20/2024     11/20/2024     Lab Results   Component Value Date    SEDRATE 56 (H) 11/20/2024     Lab Results   Component Value Date    CRP 2.03 (H) 11/20/2024     No results found for: \"QUANTIFERO\", \"QUANTITB1\", \"QUANTITB2\", \"QUANTIFERN\", \"QUANTIFERM\", \"QUANTITBGLDP\"  No results found for: " "\"RF\"  No results found for: \"HEPBSAG\", \"HEPAIGM\", \"HEPBIGMCORE\", \"HEPCVIRUSABY\"      Procedures    Assessment / Plan      -Rheumatoid arthritis of multiple sites, seropositive  Assessment & Plan:  +CCP>250; +RF; dz start 11.2009;  works [employer] 3 days per week;   intolerant arava  *Current Rx:  plaquenil 3/25, MTX 2/10; enbrel 8/10([employer] assistance), meloxicam     Low to moderate disease activity.    Swollen joint count 0. Tender joint count 0.  Good prognosis     No swollen joints today, but she reports she is still having intermittent flareups despite Enbrel and methotrexate  -add Plaquenil to 200 mg twice daily to her methotrexate and Enbrel  Getting Enbrel through [employer] assistance.    Continue meloxicam, Mtx and Enbrel.  Well tolerated and effective.  Refilled  Discussed need for routine labs every 12 weeks.  Labs reviewed and are stable.    Plan will be to continue current medication, frequent intensive lab monitoring every 12 weeks (CBC, CMP) for toxicity monitoring and follow up in 3 months.  Update QTB at return    I am also concerned she may have neuropathy such as carpal tunnel involving her left hand.  She reports frequent numbness and tingling in the left hand, particularly at night.  Consult orthopedics Dr Aguilar to consider nerve conduction studies left arm and evaluation for neuropathy       - Sjogren's disease  Assessment & Plan:  based on positive minor salivary bx 7/5/16 with ENT     stable. Continue pilocarpine. Avoid diuretics.  Recommended conservative measures such as Biotene oral rinses, alcohol free mouthwashes, lozenges, sugar free gum, etc.    - High risk medication use  - Immunodeficiency due to drug therapy  Assessment & Plan:  Enbrel methotrexate, Plaquenil  QTB and hepatitis panel negative 2/7/2024     Meds well tolerated.  No sign of toxicity  Labs reviewed and are stable.  Continue labs every 12 weeks for monitoring    We discussed possible side effects of hydroxychloroquine " including headache, nausea, diarrhea, rare retinal pigmentation, rare neuromuscular toxicity.  Recommend eye exams every 6 to 12 months to monitor for retinal toxicity.    Risks of methotrexate include but are not limited to severe liver damage that can be fatal, the possible need for liver biopsy, bone marrow suppression that can lead to dangerously low blood counts, GI side effects including mouth sores and diarrhea, fatigue, and rare risk of severe pulmonary complications. There should be no alcohol consumed with MTX. MTX can cause severe fetal abnormalities whether the mother or father is taking the medication and thus must be avoided if pregnancy is a possibility.  All medication is to be taken one day a week only. The need for q 8-12 week labs and the need for folic acid supplementation were discussed.      We discussed biologic agents at length. Risks and alternatives were discussed at length and the option of no treatment was also given. We discussed risks including but not limited to infections which can be unusual, severe, and deadly. When possible, these agents should be stopped immediately if infections occur. Unusual infection such as TB and fungal infections can occur. There may be an increased risk of lymphoma with these agents. Other risks can include a multiple sclerosis-like illness and worsening of heart failure. Infusion or injection reactions which can be deadly have been reported. Studies on pregnancy have not been done so pregnancy should be avoided while on these agents. Reactivation of a deadly brain virus and hepatitis viruses have been reported. Worsening of COPD has been seen with orencia. Elevated lipids, elevation in liver functions, and dangerous changes in blood counts have been seen with certain agents. Regular monitoring will be required.        - [facility] osteoarthritis  Assessment & Plan:    Trigger of left thumb improved     Continues on p.r.n. Meloxicam   Risks of NSAIDs discussed  including GI upset, GI bleeding, renal and hepatic risks and the risks of cardiovascular disease and stroke. Warned patient not to take with other NSAIDs including OTC NSAIDs.        -Osteoporosis  Assessment & Plan:  Fosamax start December 2019  DEXA 12/19/2019 T-score left femoral neck -2, L-spine -1.2  DEXA 5/16/22  Left Fem neck -1.0 L spine -0.5 which has improved since 2019 scan  DXA 1/29/2025: T-score -2.7 left femoral neck    -Worsening osteoporosis on latest bone density scan 1/29/2025 with T-score -2.7 left femoral neck  Continue Calcium + D OTC  Continue regular weightbearing exercise.   -Add Prolia 3 months after tooth extraction which was just done a few weeks ago.  Plan to start Prolia in June 2025  Prolia handout provided  Risk benefits of prolia discussed in detail including but not limited to osteonecrosis jaw, atypical femoral fracture, bone death, kidney failure, hypocalcemia. Patient has no reported jaw or tooth pain.       -Gastroesophageal reflux disease  Assessment & Plan:  Following with [facility] GI Dr. Arceo          1. Rheumatoid arthritis, involving unspecified site, unspecified whether rheumatoid factor present    2. Anemia, unspecified type    3. Sjogren's syndrome, with unspecified organ involvement    4. High risk medication use    5. Immunodeficiency due to drug therapy    6. Vitamin D deficiency    7. Numbness and tingling in left hand        Assessment & Plan        Orders Placed This Encounter   Procedures    CBC Auto Differential    Comprehensive Metabolic Panel    C-reactive Protein    Sedimentation Rate    Vitamin B12    TSH    Iron Profile    Ferritin    Vitamin D,25-Hydroxy    Ambulatory Referral to Orthopedic Surgery     New Medications Ordered This Visit   Medications    Diclofenac Sodium (VOLTAREN) 1 % gel gel     Sig: Apply  topically to the appropriate area as directed 4 (Four) Times a Day As Needed (joint pain).     Dispense:  100 g     Refill:  5     hydroxychloroquine (PLAQUENIL) 200 MG tablet     Sig: Take 1 tablet by mouth 2 (Two) Times a Day.     Dispense:  60 tablet     Refill:  5       Follow Up:   Return in about 3 months (around 6/25/2025).      Discussed plan of care in detail with the patient today.  Patient verbalized understanding and agrees.    I confirm accuracy of unchanged data/findings which have been carried forward from previous visit.  I have updated appropriately those that have changed.        Renan Wong MD  [facility]

## 2025-03-25 NOTE — TELEPHONE ENCOUNTER
Therapy plan in to be reviewed and signed. Start date 6/11/25. Nursing order added to wait 3 months after 3/11/25 extraction. Order for [facility], they schedule their own patients after order in and PA approved.

## 2025-03-25 NOTE — TELEPHONE ENCOUNTER
Prior auth prolia for osteoporosis with plan to start in early June 2025 (she had tooth extracted 3/11/25, so wait 3 months after to start Prolia)

## 2025-03-26 LAB
ANISOCYTOSIS BLD QL: ABNORMAL
BASOPHILS # BLD MANUAL: 0.1 10*3/MM3 (ref 0–0.2)
BASOPHILS NFR BLD MANUAL: 2 % (ref 0–1.5)
DEPRECATED RDW RBC AUTO: 47.4 FL (ref 37–54)
EOSINOPHIL # BLD MANUAL: 0.2 10*3/MM3 (ref 0–0.4)
EOSINOPHIL NFR BLD MANUAL: 4 % (ref 0.3–6.2)
ERYTHROCYTE [DISTWIDTH] IN BLOOD BY AUTOMATED COUNT: 14.3 % (ref 12.3–15.4)
ERYTHROCYTE [SEDIMENTATION RATE] IN BLOOD: 57 MM/HR (ref 0–30)
FERRITIN SERPL-MCNC: 123 NG/ML (ref 13–150)
HCT VFR BLD AUTO: 39.5 % (ref 34–46.6)
HGB BLD-MCNC: 13 G/DL (ref 12–15.9)
LYMPHOCYTES # BLD MANUAL: 1.12 10*3/MM3 (ref 0.7–3.1)
LYMPHOCYTES NFR BLD MANUAL: 5 % (ref 5–12)
MCH RBC QN AUTO: 30.3 PG (ref 26.6–33)
MCHC RBC AUTO-ENTMCNC: 32.9 G/DL (ref 31.5–35.7)
MCV RBC AUTO: 92.1 FL (ref 79–97)
MONOCYTES # BLD: 0.25 10*3/MM3 (ref 0.1–0.9)
NEUTROPHILS # BLD AUTO: 3.4 10*3/MM3 (ref 1.7–7)
NEUTROPHILS NFR BLD MANUAL: 67 % (ref 42.7–76)
PLAT MORPH BLD: NORMAL
PLATELET # BLD AUTO: 279 10*3/MM3 (ref 140–450)
PMV BLD AUTO: 10 FL (ref 6–12)
POIKILOCYTOSIS BLD QL SMEAR: ABNORMAL
RBC # BLD AUTO: 4.29 10*6/MM3 (ref 3.77–5.28)
VARIANT LYMPHS NFR BLD MANUAL: 1 % (ref 0–5)
VARIANT LYMPHS NFR BLD MANUAL: 21 % (ref 19.6–45.3)
WBC MORPH BLD: NORMAL
WBC NRBC COR # BLD AUTO: 5.07 10*3/MM3 (ref 3.4–10.8)

## 2025-04-07 RX ORDER — ROSUVASTATIN CALCIUM 5 MG/1
5 TABLET, COATED ORAL DAILY
Qty: 90 TABLET | Refills: 1 | Status: SHIPPED | OUTPATIENT
Start: 2025-04-07

## 2025-05-05 RX ORDER — VENLAFAXINE HYDROCHLORIDE 37.5 MG/1
37.5 CAPSULE, EXTENDED RELEASE ORAL DAILY
Qty: 90 CAPSULE | Refills: 0 | Status: SHIPPED | OUTPATIENT
Start: 2025-05-05

## 2025-05-15 RX ORDER — VENLAFAXINE HYDROCHLORIDE 37.5 MG/1
37.5 CAPSULE, EXTENDED RELEASE ORAL DAILY
Qty: 90 CAPSULE | Refills: 0 | Status: SHIPPED | OUTPATIENT
Start: 2025-05-15

## 2025-05-23 ENCOUNTER — HOSPITAL ENCOUNTER (OUTPATIENT)
Dept: GENERAL RADIOLOGY | Facility: HOSPITAL | Age: 70
Discharge: HOME | End: 2025-05-23
Payer: MEDICARE

## 2025-05-23 ENCOUNTER — OFFICE VISIT (OUTPATIENT)
Dept: INTERNAL MEDICINE | Facility: CLINIC | Age: 70
End: 2025-05-23
Payer: MEDICARE

## 2025-05-23 VITALS
TEMPERATURE: 97.3 F | WEIGHT: 161 LBS | BODY MASS INDEX: 27.49 KG/M2 | HEART RATE: 71 BPM | SYSTOLIC BLOOD PRESSURE: 150 MMHG | HEIGHT: 64 IN | RESPIRATION RATE: 16 BRPM | DIASTOLIC BLOOD PRESSURE: 86 MMHG | OXYGEN SATURATION: 97 %
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
## 2025-05-23 DIAGNOSIS — M25.551 BILATERAL HIP PAIN: ICD-10-CM

## 2025-05-23 DIAGNOSIS — M25.552 BILATERAL HIP PAIN: ICD-10-CM

## 2025-05-23 DIAGNOSIS — M25.571 ACUTE RIGHT ANKLE PAIN: ICD-10-CM
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
## 2025-05-23 PROCEDURE — 73521 X-RAY EXAM HIPS BI 2 VIEWS: CPT

## 2025-05-23 PROCEDURE — 73610 X-RAY EXAM OF ANKLE: CPT

## 2025-05-23 PROCEDURE — 73590 X-RAY EXAM OF LOWER LEG: CPT

## 2025-05-23 NOTE — PROGRESS NOTES
Subjective     Patient ID: Tiffany Martinez is a 69 y.o. female. Patient is here for management of multiple medical problems.     Chief Complaint   Patient presents with    Foot Injury     Right foot injury, stepped in a hole in the yard while walking on Wednesday, 05/14/25 still having swelling, bruising, and pain.     History of Present Illness   Foot injury. Stepped in hole that dog dug.        The following portions of the patient's history were reviewed and updated as appropriate: allergies, current medications, past family history, past medical history, past social history, past surgical history and problem list.    Review of Systems    Current Outpatient Medications:     amLODIPine (NORVASC) 5 MG tablet, Take 1 tablet by mouth Daily., Disp: , Rfl:     chlorhexidine (PERIDEX) 0.12 % solution, , Disp: , Rfl:     ciclopirox (PENLAC) 8 % solution, APPLY ONCE EVERY DAY TO TOENAILS, Disp: , Rfl:     Diclofenac Sodium (VOLTAREN) 1 % gel gel, Apply  topically to the appropriate area as directed 4 (Four) Times a Day As Needed (joint pain)., Disp: 100 g, Rfl: 5    ENBREL SURECLICK 50 MG/ML solution auto-injector, Inject 50 mg under the skin every 7 days., Disp: , Rfl:     folic acid (FOLVITE) 1 MG tablet, Take 5 tablets by mouth Daily., Disp: 450 tablet, Rfl: 3    hydroxychloroquine (PLAQUENIL) 200 MG tablet, Take 1 tablet by mouth 2 (Two) Times a Day., Disp: 60 tablet, Rfl: 5    Linzess 145 MCG capsule capsule, Take 1 capsule by mouth Daily., Disp: , Rfl:     meloxicam (MOBIC) 15 MG tablet, Take 1 tablet by mouth Daily As Needed for Mild Pain., Disp: 90 tablet, Rfl: 1    methotrexate 2.5 MG tablet, Take 10 tablets by mouth 1 (One) Time Per Week., Disp: 120 tablet, Rfl: 0    metoprolol succinate XL (TOPROL-XL) 50 MG 24 hr tablet, Take 1 tablet by mouth Daily., Disp: 30 tablet, Rfl: 9    omeprazole (priLOSEC) 40 MG capsule, Take 1 capsule by mouth Daily., Disp: , Rfl:     pantoprazole (PROTONIX) 40 MG EC tablet, Take 1  "tablet by mouth Daily., Disp: , Rfl:     pilocarpine (SALAGEN) 5 MG tablet, TAKE 1 TABLET BY MOUTH THREE TIMES DAILY AS NEEDED FOR DRYNESS, Disp: 270 tablet, Rfl: 0    rosuvastatin (CRESTOR) 5 MG tablet, TAKE 1 TABLET BY MOUTH DAILY, Disp: 90 tablet, Rfl: 1    Sodium Fluoride 5000 Plus 1.1 % cream, USE TO BRUSH TEETH EVERY DAY, Disp: , Rfl:     venlafaxine XR (EFFEXOR-XR) 37.5 MG 24 hr capsule, TAKE 1 CAPSULE BY MOUTH DAILY, Disp: 90 capsule, Rfl: 0    Objective      Blood pressure 150/86, pulse 71, temperature 97.3 °F (36.3 °C), resp. rate 16, height 162.6 cm (64.02\"), weight 73 kg (161 lb), SpO2 97%, not currently breastfeeding.            Physical Exam     General Appearance:    Alert, cooperative, no distress, appears stated age   Head:    Normocephalic, without obvious abnormality, atraumatic   Eyes:    PERRL, conjunctiva/corneas clear, EOM's intact   Ears:    Normal TM's and external ear canals, both ears   Nose:   Nares normal, septum midline, mucosa normal, no drainage   or sinus tenderness   Throat:   Lips, mucosa, and tongue normal; teeth and gums normal   Neck:   Supple, symmetrical, trachea midline, no adenopathy;        thyroid:  No enlargement/tenderness/nodules; no carotid    bruit or JVD   Back:     Symmetric, no curvature, ROM normal, no CVA tenderness   Lungs:     Clear to auscultation bilaterally, respirations unlabored   Chest wall:    No tenderness or deformity   Heart:    Regular rate and rhythm, S1 and S2 normal, no murmur,        rub or gallop   Abdomen:     Soft, non-tender, bowel sounds active all four quadrants,     no masses, no organomegaly   Extremities:   Extremities normal, atraumatic, no cyanosis or edema   Pulses:   2+ and symmetric all extremities   Skin:   Skin color, texture, turgor normal, no rashes or lesions   Lymph nodes:   Cervical, supraclavicular, and axillary nodes normal   Neurologic:   CNII-XII intact. Normal strength, sensation and reflexes       throughout "      Results for orders placed or performed in visit on 03/25/25   CBC Auto Differential    Collection Time: 03/25/25 11:18 AM    Specimen: Blood   Result Value Ref Range    WBC 5.07 3.40 - 10.80 10*3/mm3    RBC 4.29 3.77 - 5.28 10*6/mm3    Hemoglobin 13.0 12.0 - 15.9 g/dL    Hematocrit 39.5 34.0 - 46.6 %    MCV 92.1 79.0 - 97.0 fL    MCH 30.3 26.6 - 33.0 pg    MCHC 32.9 31.5 - 35.7 g/dL    RDW 14.3 12.3 - 15.4 %    RDW-SD 47.4 37.0 - 54.0 fl    MPV 10.0 6.0 - 12.0 fL    Platelets 279 140 - 450 10*3/mm3   Comprehensive Metabolic Panel    Collection Time: 03/25/25 11:18 AM    Specimen: Blood   Result Value Ref Range    Glucose 84 65 - 99 mg/dL    BUN 15 8 - 23 mg/dL    Creatinine 0.72 0.57 - 1.00 mg/dL    Sodium 140 136 - 145 mmol/L    Potassium 4.2 3.5 - 5.2 mmol/L    Chloride 103 98 - 107 mmol/L    CO2 25.0 22.0 - 29.0 mmol/L    Calcium 9.4 8.6 - 10.5 mg/dL    Total Protein 7.6 6.0 - 8.5 g/dL    Albumin 3.8 3.5 - 5.2 g/dL    ALT (SGPT) 11 1 - 33 U/L    AST (SGOT) 22 1 - 32 U/L    Alkaline Phosphatase 102 39 - 117 U/L    Total Bilirubin 0.2 0.0 - 1.2 mg/dL    Globulin 3.8 gm/dL    A/G Ratio 1.0 g/dL    BUN/Creatinine Ratio 20.8 7.0 - 25.0    Anion Gap 12.0 5.0 - 15.0 mmol/L    eGFR 91.2 >60.0 mL/min/1.73   C-reactive Protein    Collection Time: 03/25/25 11:18 AM    Specimen: Blood   Result Value Ref Range    C-Reactive Protein 3.98 (H) 0.00 - 0.50 mg/dL   Sedimentation Rate    Collection Time: 03/25/25 11:18 AM    Specimen: Blood   Result Value Ref Range    Sed Rate 57 (H) 0 - 30 mm/hr   Vitamin B12    Collection Time: 03/25/25 11:18 AM    Specimen: Blood   Result Value Ref Range    Vitamin B-12 616 211 - 946 pg/mL   TSH    Collection Time: 03/25/25 11:18 AM    Specimen: Blood   Result Value Ref Range    TSH 2.770 0.270 - 4.200 uIU/mL   Iron Profile    Collection Time: 03/25/25 11:18 AM    Specimen: Blood   Result Value Ref Range    Iron 66 37 - 145 mcg/dL    Iron Saturation (TSAT) 18 (L) 20 - 50 %    Transferrin  248 200 - 360 mg/dL    TIBC 370 298 - 536 mcg/dL   Ferritin    Collection Time: 03/25/25 11:18 AM    Specimen: Blood   Result Value Ref Range    Ferritin 123.00 13.00 - 150.00 ng/mL   Vitamin D,25-Hydroxy    Collection Time: 03/25/25 11:18 AM    Specimen: Blood   Result Value Ref Range    25 Hydroxy, Vitamin D 26.3 (L) 30.0 - 100.0 ng/ml   Manual Differential    Collection Time: 03/25/25 11:18 AM    Specimen: Blood   Result Value Ref Range    Neutrophil % 67.0 42.7 - 76.0 %    Lymphocyte % 21.0 19.6 - 45.3 %    Monocyte % 5.0 5.0 - 12.0 %    Eosinophil % 4.0 0.3 - 6.2 %    Basophil % 2.0 (H) 0.0 - 1.5 %    Atypical Lymphocyte % 1.0 0.0 - 5.0 %    Neutrophils Absolute 3.40 1.70 - 7.00 10*3/mm3    Lymphocytes Absolute 1.12 0.70 - 3.10 10*3/mm3    Monocytes Absolute 0.25 0.10 - 0.90 10*3/mm3    Eosinophils Absolute 0.20 0.00 - 0.40 10*3/mm3    Basophils Absolute 0.10 0.00 - 0.20 10*3/mm3    Anisocytosis Slight/1+ None Seen    Poikilocytes Slight/1+ None Seen    WBC Morphology Normal Normal    Platelet Morphology Normal Normal         Assessment & Plan       Diagnoses and all orders for this visit:    1. Fall, initial encounter (Primary)  -     XR Ankle 3+ View Right; Future  -     XR Tibia Fibula 2 View Right; Future  -     XR Hips Bilateral With or Without Pelvis 2 View; Future    2. Acute right ankle pain  -     XR Ankle 3+ View Right; Future  -     XR Tibia Fibula 2 View Right; Future  -     XR Hips Bilateral With or Without Pelvis 2 View; Future    3. Bilateral hip pain  -     XR Ankle 3+ View Right; Future  -     XR Tibia Fibula 2 View Right; Future  -     XR Hips Bilateral With or Without Pelvis 2 View; Future      No follow-ups on file.          There are no Patient Instructions on file for this visit.     Santo Pinon MD    Assessment & Plan

## 2025-05-29 ENCOUNTER — OFFICE VISIT (OUTPATIENT)
Dept: ORTHOPEDIC SURGERY | Facility: CLINIC | Age: 70
End: 2025-05-29
Payer: MEDICARE

## 2025-05-29 VITALS
BODY MASS INDEX: 27.49 KG/M2 | DIASTOLIC BLOOD PRESSURE: 82 MMHG | HEIGHT: 64 IN | SYSTOLIC BLOOD PRESSURE: 124 MMHG | WEIGHT: 161 LBS

## 2025-05-29 DIAGNOSIS — S82.61XA CLOSED AVULSION FRACTURE OF LATERAL MALLEOLUS OF RIGHT FIBULA, INITIAL ENCOUNTER: Primary | ICD-10-CM

## 2025-05-29 DIAGNOSIS — M54.31 SCIATICA OF RIGHT SIDE: ICD-10-CM

## 2025-05-29 NOTE — PROGRESS NOTES
Office Note     Name: Tiffany Martinez    : 1955     MRN: [id number]     Chief Complaint  Injury of the Right Ankle (States she stepped in a hole her dogs dug in her yard 25, presents in pneumatic boot. )    Subjective     History of Present Illness:  Tiffany Martinez is a 69 y.o. female presenting today for evaluation of acute right lateral malleolus avulsion fracture.  This occurred approximately 2weeks ago.  The patient rates their pain as 6, at rest and with activity, aching and throbbing, with radiation into thecalf.  The patient denies to paresthesias in the affected area.  The patient denies previous injury or surgery to the affected area.  She also has chronic right buttocks pain that occasional radiates down the leg into the right calf.  This pain has been present for several months and she denies any inciting injury or event.  She does note worsening of these symptoms since her fall.    She presents in a walking boot, able to bear weight with minimally increased pain.  Patient works a desk job as a cash office.     Review of Systems     Past Medical History:   Diagnosis Date    Acid reflux     Arrhythmia     Colon polyp 2015    Diverticulosis 2015    Hemorrhoids, internal 2015    High risk medication use     History of torn meniscus of left knee     Hyperlipidemia     Hypertension     Immunosuppression     Osteoarthritis     Rheumatoid arthritis     Rheumatoid arthritis with rheumatoid factor of multiple sites without organ or systems involvement     Shingles 2020    Left abd/flank/back    Sjogrens syndrome     Thrush, oral         Past Surgical History:   Procedure Laterality Date     SECTION      times 2    COLONOSCOPY W/ POLYPECTOMY  2021    COSMETIC SURGERY      ENDOSCOPY  2021    [facility]     HYSTEROSCOPY ENDOMETRIAL ABLATION  2016    D&C polypectomy [facility]    TUBAL ABDOMINAL LIGATION Bilateral        Family History   Problem Relation Age of Onset     Heart disease Mother     Hypertension Mother     Heart attack Father     Stroke Father 79         from stroke    Heart disease Father     Hypertension Father     Hyperlipidemia Father     No Known Problems Brother     Breast cancer Neg Hx     Ovarian cancer Neg Hx     Colon cancer Neg Hx        Social History     Socioeconomic History    Marital status:    Tobacco Use    Smoking status: Former     Current packs/day: 0.00     Average packs/day: 0.6 packs/day for 7.0 years (4.5 ttl pk-yrs)     Types: Cigarettes     Start date: 2017     Quit date: 12/15/2022     Years since quittin.4     Passive exposure: Past    Smokeless tobacco: Never    Tobacco comments:     going to quit   Vaping Use    Vaping status: Never Used   Substance and Sexual Activity    Alcohol use: Not Currently    Drug use: No    Sexual activity: Yes     Partners: Male     Birth control/protection: Tubal ligation         Current Outpatient Medications:     amLODIPine (NORVASC) 5 MG tablet, Take 1 tablet by mouth Daily., Disp: , Rfl:     chlorhexidine (PERIDEX) 0.12 % solution, , Disp: , Rfl:     ciclopirox (PENLAC) 8 % solution, APPLY ONCE EVERY DAY TO TOENAILS, Disp: , Rfl:     Diclofenac Sodium (VOLTAREN) 1 % gel gel, Apply  topically to the appropriate area as directed 4 (Four) Times a Day As Needed (joint pain)., Disp: 100 g, Rfl: 5    ENBREL SURECLICK 50 MG/ML solution auto-injector, Inject 50 mg under the skin every 7 days., Disp: , Rfl:     folic acid (FOLVITE) 1 MG tablet, Take 5 tablets by mouth Daily., Disp: 450 tablet, Rfl: 3    hydroxychloroquine (PLAQUENIL) 200 MG tablet, Take 1 tablet by mouth 2 (Two) Times a Day., Disp: 60 tablet, Rfl: 5    Linzess 145 MCG capsule capsule, Take 1 capsule by mouth Daily., Disp: , Rfl:     meloxicam (MOBIC) 15 MG tablet, Take 1 tablet by mouth Daily As Needed for Mild Pain., Disp: 90 tablet, Rfl: 1    methotrexate 2.5 MG tablet, Take 10 tablets by mouth 1 (One) Time Per Week., Disp:  "120 tablet, Rfl: 0    metoprolol succinate XL (TOPROL-XL) 50 MG 24 hr tablet, Take 1 tablet by mouth Daily., Disp: 30 tablet, Rfl: 9    omeprazole (priLOSEC) 40 MG capsule, Take 1 capsule by mouth Daily., Disp: , Rfl:     pantoprazole (PROTONIX) 40 MG EC tablet, Take 1 tablet by mouth Daily., Disp: , Rfl:     pilocarpine (SALAGEN) 5 MG tablet, TAKE 1 TABLET BY MOUTH THREE TIMES DAILY AS NEEDED FOR DRYNESS, Disp: 270 tablet, Rfl: 0    rosuvastatin (CRESTOR) 5 MG tablet, TAKE 1 TABLET BY MOUTH DAILY, Disp: 90 tablet, Rfl: 1    Sodium Fluoride 5000 Plus 1.1 % cream, USE TO BRUSH TEETH EVERY DAY, Disp: , Rfl:     venlafaxine XR (EFFEXOR-XR) 37.5 MG 24 hr capsule, TAKE 1 CAPSULE BY MOUTH DAILY, Disp: 90 capsule, Rfl: 0    Allergies   Allergen Reactions    Codeine Nausea And Vomiting and Hives           Objective   /82   Ht 162.6 cm (64.02\")   Wt 73 kg (161 lb)   BMI 27.62 kg/m²            Physical Exam  Right Ankle Exam     Tenderness   The patient is experiencing tenderness in the lateral malleolus.  Swelling: mild    Other   Erythema: absent  Sensation: normal  Pulse: present     Comments:  Mild tenderness on posterior aspect of lateral malleolus.  No tenderness in Achilles or calf.  No tenderness in medial ankle or base of fifth metatarsal.  Moves all toes well without pain.  Distal neurovascular checks are normal.      Back Exam     Comments:  Does have tenderness in the right gluteal muscles.  She has pain radiating from the glutes down her posterior thigh into her calf and ankle.  Normal range of motion of the knee and hip.  Further evaluation was deferred due to current ankle fracture.           Extremity DVT signs are negative by clinical screen.     Independent Review of Radiographic Studies:    I personally reviewed the available, pertinent imaging studies and I agree with the radiologist's interpretation.    XR Ankle 3+ View Right  Order date: 5/23/2025  Authorizing: Santo Pinon MD  Ordered by " Santo Pinon MD on 5/23/2025.     Narrative & Impression    XR TIBIA FIBULA 2 VW RIGHT, XR ANKLE 3+ VW RIGHT, XR HIPS BILATERAL W OR WO PELVIS 2 VIEW     Date of Exam: 5/23/2025 3:11 PM EDT     Indication: fall hip and lower extremity pain.           Comparison: None available.     Findings:  Studies are limited by osteopenia.     The bony pelvis is intact without evidence of acute fracture or diastases. Sacrum and coccyx obscured by bowel gas inferiorly.     Peripheral vascular calcification noted on the right.     Right hip demonstrates no acute fracture or dislocation. Left hip demonstrates no definite acute fracture or dislocation. Limited imaging of the soft tissues grossly unremarkable.     Right tibia and fibula: Proximal portions of the tibia and fibula demonstrate no acute osseous abnormality. Findings at the ankle discussed below. Limited imaging the knee is grossly unremarkable     Right ankle: Fracture through the inferior aspect of the lateral malleolus is noted with overlying soft tissue swelling ankle mortise is intact. Spurring noted at the plantar fascial attachment to the calcaneus. Degenerative changes are noted at the   midfoot. Soft tissue swelling extends anteriorly from the ankle into the foot     IMPRESSION:  Impression:  1.No definite acute osseous abnormality of the bony pelvis, right hip, or proximal right tibia and fibula.  2.Fracture of the lateral malleolus with overlying soft tissue swelling. Ankle mortise is intact.  3.Peripheral vascular calcification.  4.Evaluation of the sacrum and coccyx is limited by osteopenia.           Electronically Signed: Delano Duran MD    5/23/2025 3:45 PM EDT    Workstation ID: [id number]       Review of prior note(s) from external source: 1  Review of unique test(s): 3    Procedures    Assessment and Plan   Diagnoses and all orders for this visit:    1. Closed avulsion fracture of lateral malleolus of right fibula, initial encounter  (Primary)    2. Sciatica of right side       Discussion of orthopedic goals  Orthopedic activities reviewed and patient expressed appreciation  Risk, benefits, and merits of treatment alternatives reviewed with the patient and questions answered  Patient guided on mobility and conditioning exercises  RICE, heat, and/or modalities as needed and beneficial  Elevate foot for residual swelling  Discussed gentle range of motion activities/exercises for the affected joint with the patient.    Reduced physical activity as appropriate and avoid aggravating activities.   Weight bearing parameters reviewed.   Use brace as instructed.     Recommendations/Plan:  Exercise, medications, injections, other patient advice, and return appointment as noted.  Brace: No brace was given at today's visit. Low tide foot and ankle immobilizer boot.  Patient counseled for and provided with a HEP.    Patient and/or guardian(s) were encouraged to call or return to the office for any issues or concerns.    Advised weightbearing as tolerated with walking boot.  Discussed gentle range of motion of the ankle for early range of motion.  Advised RICE, heat, and over-the-counter Tylenol as needed for pain.  Advise follow-up with me in 4 weeks for reevaluation.  At that time we will investigate her apparent right leg sciatica further.    Return in about 4 weeks (around 6/26/2025) for XOA right ankle, L-spine with flexion and extension.

## 2025-06-12 ENCOUNTER — HOSPITAL ENCOUNTER (OUTPATIENT)
Facility: HOSPITAL | Age: 70
Discharge: HOME | End: 2025-06-12
Admitting: INTERNAL MEDICINE
Payer: MEDICARE

## 2025-06-12 VITALS
OXYGEN SATURATION: 97 % | RESPIRATION RATE: 16 BRPM | BODY MASS INDEX: 27.35 KG/M2 | SYSTOLIC BLOOD PRESSURE: 145 MMHG | DIASTOLIC BLOOD PRESSURE: 87 MMHG | WEIGHT: 159.4 LBS | HEART RATE: 59 BPM

## 2025-06-12 DIAGNOSIS — M81.0 OSTEOPOROSIS, UNSPECIFIED OSTEOPOROSIS TYPE, UNSPECIFIED PATHOLOGICAL FRACTURE PRESENCE: Primary | ICD-10-CM

## 2025-06-12 PROCEDURE — 96372 THER/PROPH/DIAG INJ SC/IM: CPT

## 2025-06-12 PROCEDURE — 25010000002 DENOSUMAB 60 MG/ML SOLUTION PREFILLED SYRINGE: Performed by: INTERNAL MEDICINE

## 2025-06-12 RX ADMIN — DENOSUMAB 60 MG: 60 INJECTION SUBCUTANEOUS at 08:47

## 2025-06-26 ENCOUNTER — OFFICE VISIT (OUTPATIENT)
Dept: ORTHOPEDIC SURGERY | Facility: CLINIC | Age: 70
End: 2025-06-26
Payer: MEDICARE

## 2025-06-26 VITALS
WEIGHT: 159.6 LBS | HEIGHT: 64 IN | DIASTOLIC BLOOD PRESSURE: 86 MMHG | SYSTOLIC BLOOD PRESSURE: 130 MMHG | BODY MASS INDEX: 27.25 KG/M2

## 2025-06-26 DIAGNOSIS — G89.29 CHRONIC RIGHT-SIDED LOW BACK PAIN WITH RIGHT-SIDED SCIATICA: ICD-10-CM

## 2025-06-26 DIAGNOSIS — Z79.899 HIGH RISK MEDICATION USE: ICD-10-CM

## 2025-06-26 DIAGNOSIS — M54.31 SCIATICA OF RIGHT SIDE: ICD-10-CM

## 2025-06-26 DIAGNOSIS — S82.61XA CLOSED AVULSION FRACTURE OF LATERAL MALLEOLUS OF RIGHT FIBULA, INITIAL ENCOUNTER: Primary | ICD-10-CM

## 2025-06-26 DIAGNOSIS — M05.79 RHEUMATOID ARTHRITIS INVOLVING MULTIPLE SITES WITH POSITIVE RHEUMATOID FACTOR: ICD-10-CM

## 2025-06-26 DIAGNOSIS — D84.821 IMMUNOSUPPRESSION DUE TO DRUG THERAPY: ICD-10-CM

## 2025-06-26 DIAGNOSIS — Z79.899 IMMUNOSUPPRESSION DUE TO DRUG THERAPY: ICD-10-CM

## 2025-06-26 DIAGNOSIS — M54.41 CHRONIC RIGHT-SIDED LOW BACK PAIN WITH RIGHT-SIDED SCIATICA: ICD-10-CM

## 2025-06-26 DIAGNOSIS — M43.16 ANTEROLISTHESIS OF LUMBAR SPINE: ICD-10-CM

## 2025-06-26 RX ORDER — TIZANIDINE 2 MG/1
2 TABLET ORAL EVERY 8 HOURS PRN
Qty: 30 TABLET | Refills: 0 | Status: SHIPPED | OUTPATIENT
Start: 2025-06-26

## 2025-06-26 NOTE — PROGRESS NOTES
Office Note     Name: Tiffany Martinez    : 1955     MRN: [id number]     Chief Complaint  Follow-up of the Right Ankle (Closed avulsion fracture of lateral malleolus of right fibula,  DOI: 25.)    Subjective     History of Present Illness:  Tiffany Martinez is a 69 y.o. female presenting today for right ankle follow-up and for evaluation of chronic lower back pain.    Right ankle:  Patient has an incomplete avulsion fracture of the lateral malleolus.  This appears to be healing appropriately and her symptoms are improving.  She complains of only mild pain at this time.  She has tried walking without the boot and denies any instability or weakness in the right ankle.  Denies paresthesias in the right foot.    Chronic low back pain with right sided sciatica   Patient states that she has had chronic low back for several years.  She developed sciatica-like symptoms approximately 6 months ago.  She noted that the back pain is worsening.  She has tried PT for this in the past with worsening symptoms.  The back pain tends to be on the right side and right buttocks.  The pain radiates from the buttocks into the posterior right leg to the calf.  She does have occasional N/T down the right leg.  Denies significant leg weakness.  Notes that with prolonged walking such as going to the grocery store she has to lean on the cart the entire time.  She has never been evaluated by back specialist.  She does take oral NSAIDs, topical NSAIDs, and use of a heating pad daily for her symptoms with only mild relief.  She denies any previous injury or surgery to the lower back.    Review of Systems   Musculoskeletal:  Positive for arthralgias (right ankle).        Past Medical History:   Diagnosis Date    Acid reflux     Arrhythmia     Colon polyp 2015    Diverticulosis 2015    Hemorrhoids, internal 2015    High risk medication use     History of torn meniscus of left knee     Hyperlipidemia     Hypertension      Immunosuppression     Osteoarthritis     Rheumatoid arthritis     Rheumatoid arthritis with rheumatoid factor of multiple sites without organ or systems involvement     Shingles 2020    Left abd/flank/back    Sjogrens syndrome     Thrush, oral         Past Surgical History:   Procedure Laterality Date     SECTION      times 2    COLONOSCOPY W/ POLYPECTOMY  2021    COSMETIC SURGERY      ENDOSCOPY  2021    [facility]     HYSTEROSCOPY ENDOMETRIAL ABLATION  2016    D&C polypectomy [facility]    TUBAL ABDOMINAL LIGATION Bilateral        Family History   Problem Relation Age of Onset    Heart disease Mother     Hypertension Mother     Heart attack Father     Stroke Father 79         from stroke    Heart disease Father     Hypertension Father     Hyperlipidemia Father     No Known Problems Brother     Breast cancer Neg Hx     Ovarian cancer Neg Hx     Colon cancer Neg Hx        Social History     Socioeconomic History    Marital status:    Tobacco Use    Smoking status: Former     Current packs/day: 0.00     Average packs/day: 0.6 packs/day for 7.0 years (4.5 ttl pk-yrs)     Types: Cigarettes     Start date: 2017     Quit date: 12/15/2022     Years since quittin.5     Passive exposure: Past    Smokeless tobacco: Never    Tobacco comments:     going to quit   Vaping Use    Vaping status: Never Used   Substance and Sexual Activity    Alcohol use: Not Currently    Drug use: No    Sexual activity: Yes     Partners: Male     Birth control/protection: Tubal ligation         Current Outpatient Medications:     amLODIPine (NORVASC) 5 MG tablet, Take 1 tablet by mouth Daily., Disp: , Rfl:     chlorhexidine (PERIDEX) 0.12 % solution, , Disp: , Rfl:     ciclopirox (PENLAC) 8 % solution, APPLY ONCE EVERY DAY TO TOENAILS, Disp: , Rfl:     Diclofenac Sodium (VOLTAREN) 1 % gel gel, Apply  topically to the appropriate area as directed 4 (Four) Times a Day As Needed (joint pain)., Disp: 100 g,  "Rfl: 5    ENBREL SURECLICK 50 MG/ML solution auto-injector, Inject 50 mg under the skin every 7 days., Disp: , Rfl:     folic acid (FOLVITE) 1 MG tablet, Take 5 tablets by mouth Daily., Disp: 450 tablet, Rfl: 3    hydroxychloroquine (PLAQUENIL) 200 MG tablet, Take 1 tablet by mouth 2 (Two) Times a Day., Disp: 60 tablet, Rfl: 5    Linzess 145 MCG capsule capsule, Take 1 capsule by mouth Daily., Disp: , Rfl:     meloxicam (MOBIC) 15 MG tablet, Take 1 tablet by mouth Daily As Needed for Mild Pain., Disp: 90 tablet, Rfl: 1    methotrexate 2.5 MG tablet, Take 10 tablets by mouth 1 (One) Time Per Week., Disp: 120 tablet, Rfl: 0    metoprolol succinate XL (TOPROL-XL) 50 MG 24 hr tablet, Take 1 tablet by mouth Daily., Disp: 30 tablet, Rfl: 9    omeprazole (priLOSEC) 40 MG capsule, Take 1 capsule by mouth Daily., Disp: , Rfl:     pantoprazole (PROTONIX) 40 MG EC tablet, Take 1 tablet by mouth Daily., Disp: , Rfl:     pilocarpine (SALAGEN) 5 MG tablet, TAKE 1 TABLET BY MOUTH THREE TIMES DAILY AS NEEDED FOR DRYNESS, Disp: 270 tablet, Rfl: 0    rosuvastatin (CRESTOR) 5 MG tablet, TAKE 1 TABLET BY MOUTH DAILY, Disp: 90 tablet, Rfl: 1    Sodium Fluoride 5000 Plus 1.1 % cream, USE TO BRUSH TEETH EVERY DAY, Disp: , Rfl:     tiZANidine (ZANAFLEX) 2 MG tablet, Take 1 tablet by mouth Every 8 (Eight) Hours As Needed for Muscle Spasms., Disp: 30 tablet, Rfl: 0    venlafaxine XR (EFFEXOR-XR) 37.5 MG 24 hr capsule, TAKE 1 CAPSULE BY MOUTH DAILY, Disp: 90 capsule, Rfl: 0    Allergies   Allergen Reactions    Codeine Nausea And Vomiting and Hives           Objective   /86   Ht 162.6 cm (64.02\")   Wt 72.4 kg (159 lb 9.6 oz)   BMI 27.38 kg/m²            Physical Exam  Musculoskeletal:      Lumbar back: Positive right straight leg raise test. Negative left straight leg raise test.       Right Hip Exam     Tenderness   Right hip tenderness location: Right inferior buttocks.    Range of Motion   The patient has normal right hip " ROM.    Muscle Strength   The patient has normal right hip strength.    Tests   TINA: negative  Stephen: negative  Fadir:  Negative FADIR test    Other   Erythema: absent  Sensation: normal  Pulse: present      Back Exam     Tenderness   The patient is experiencing tenderness in the lumbar and sacroiliac.    Range of Motion   Extension:  normal   Flexion:  normal   Lateral bend right:  normal   Lateral bend left:  normal   Rotation right:  normal   Rotation left:  normal     Muscle Strength   The patient has normal back strength.    Tests   Straight leg raise right: positive at 90 deg  Straight leg raise left: negative    Reflexes   Patellar:  normal  Babinski's sign: normal     Other   Toe walk: normal  Heel walk: normal  Sensation: normal  Gait: normal   Erythema: no back redness    Comments:  Mild positive straight leg raise in the right leg at 90 degrees.  Negative Menchaca's test           Extremity DVT signs are negative on physical exam with negative Aure sign, no calf pain, no palpable cords, and no skin tone change     Independent Review of Radiographic Studies:    Three-view plain films of the right ankle were done in office today for the evaluation of fracture healing, comparison views available.  There is an incomplete avulsion fracture of the distal tip of the lateral malleolus.  Interval neovascularization is seen.  No interval displacement.    4 view plain films of the lumbar spine were done in office today for the evaluation of pain, no comparison views available.  No acute osseous abnormalities are seen.  There is L4-L5 anterolisthesis.  There is severe degenerative disc disease of L5-S1, this may also represent lumbarization of L5-S1, and moderate degenerative disc disease and spondylosis at T11-T12.  There is facet joint arthropathy.  There is mild endplate osteophytic lipping most pronounced at L3-L4.  On lateral views there is what appears to be calcification of the  aorta.      Procedures    Assessment and Plan   Diagnoses and all orders for this visit:    1. Closed avulsion fracture of lateral malleolus of right fibula, initial encounter (Primary)  -     XR Ankle 3+ View Right; Future    2. Sciatica of right side  -     XR Spine Lumbar Complete With Flex & Ext  -     Ambulatory Referral to Orthopedic Surgery  -     tiZANidine (ZANAFLEX) 2 MG tablet; Take 1 tablet by mouth Every 8 (Eight) Hours As Needed for Muscle Spasms.  Dispense: 30 tablet; Refill: 0    3. Chronic right-sided low back pain with right-sided sciatica  -     Ambulatory Referral to Orthopedic Surgery  -     tiZANidine (ZANAFLEX) 2 MG tablet; Take 1 tablet by mouth Every 8 (Eight) Hours As Needed for Muscle Spasms.  Dispense: 30 tablet; Refill: 0    4. Anterolisthesis of lumbar spine  -     Ambulatory Referral to Orthopedic Surgery       Discussion of orthopedic goals  Orthopedic activities reviewed and patient expressed appreciation  Risk, benefits, and merits of treatment alternatives reviewed with the patient and questions answered  Patient guided on mobility and conditioning exercises  RICE, heat, and/or modalities as needed and beneficial  Discussed gentle range of motion activities/exercises for the affected joint with the patient.    Reduced physical activity as appropriate and avoid aggravating activities.   Weight bearing parameters reviewed.   Take prescribed medications as instructed and only as tolerated.  An assistive device was encouraged for safety and support. This may include crutches, a cane, walker, rollator.     Recommendations/Plan:  Exercise, medications, injections, other patient advice, and return appointment as noted.  Referral: Spine sub-specialist referral.  Patient and/or guardian(s) were encouraged to call or return to the office for any issues or concerns.    Right ankle:  Right ankle appears to be healing appropriately and improving well.  Suspect symptoms will resolve over the  next 3 to 4 weeks.  Advise she may discontinue the walking boot at this time though I did suggest soft ankle brace for improved stability.  Advise follow-up with me in 4 weeks if symptoms fail to improve.    Back:  Patient has signs and symptoms consistent with chronic low back pain secondary to spondylosis, degenerative disc disease, and anterolisthesis of L4-L5.  She also has sciatica affecting the right lower extremity.  Physical therapy referral was deferred to back specialist at this time due to the presence of significant anterolisthesis.  I did suggest continuing heating pad, NSAID therapy, suggested adding Tylenol as needed, and she was prescribed a muscle relaxer today for symptomatic relief.  I described the potential side effects of Zanaflex and encouraged her not to drive after taking the medication due to potential drowsiness. Given referral to back subspecialty for further evaluation and care.    Return in about 4 weeks (around 7/24/2025), or if symptoms worsen or fail to improve.

## 2025-06-30 RX ORDER — METHOTREXATE 2.5 MG/1
TABLET ORAL
Qty: 120 TABLET | Refills: 0 | Status: SHIPPED | OUTPATIENT
Start: 2025-06-30

## 2025-06-30 NOTE — TELEPHONE ENCOUNTER
Rx Refill Note  Requested Prescriptions     Pending Prescriptions Disp Refills    methotrexate 2.5 MG tablet [Pharmacy Med Name: METHOTREXATE 2.5MG TABLETS - YELLOW] 120 tablet 0     Sig: TAKE 10 TABLETS BY MOUTH EVERY WEEK      Last office visit with prescribing clinician: 11/20/2024   Last telemedicine visit with prescribing clinician: Visit date not found   Next office visit with prescribing clinician: Visit date not found                           Diana Barrett MA  06/30/25, 08:11 EDT      Antimetabolites Protocol Failed      Please advise.

## 2025-07-31 ENCOUNTER — OFFICE VISIT (OUTPATIENT)
Dept: INTERNAL MEDICINE | Facility: CLINIC | Age: 70
End: 2025-07-31
Payer: MEDICARE

## 2025-07-31 VITALS
TEMPERATURE: 97.3 F | HEIGHT: 64 IN | HEART RATE: 58 BPM | DIASTOLIC BLOOD PRESSURE: 84 MMHG | SYSTOLIC BLOOD PRESSURE: 142 MMHG | OXYGEN SATURATION: 99 % | WEIGHT: 155 LBS | BODY MASS INDEX: 26.46 KG/M2

## 2025-07-31 DIAGNOSIS — M25.552 BILATERAL HIP PAIN: ICD-10-CM

## 2025-07-31 DIAGNOSIS — M25.562 CHRONIC PAIN OF LEFT KNEE: ICD-10-CM

## 2025-07-31 DIAGNOSIS — R79.9 ABNORMAL FINDING OF BLOOD CHEMISTRY, UNSPECIFIED: ICD-10-CM

## 2025-07-31 DIAGNOSIS — G57.01 PIRIFORMIS SYNDROME OF RIGHT SIDE: Primary | ICD-10-CM

## 2025-07-31 DIAGNOSIS — R29.6 FALLING: ICD-10-CM

## 2025-07-31 DIAGNOSIS — T46.6X5A STATIN MYOPATHY: ICD-10-CM

## 2025-07-31 DIAGNOSIS — I10 PRIMARY HYPERTENSION: ICD-10-CM

## 2025-07-31 DIAGNOSIS — M25.551 BILATERAL HIP PAIN: ICD-10-CM

## 2025-07-31 DIAGNOSIS — G72.0 STATIN MYOPATHY: ICD-10-CM

## 2025-07-31 DIAGNOSIS — G89.29 CHRONIC PAIN OF LEFT KNEE: ICD-10-CM

## 2025-07-31 DIAGNOSIS — M81.0 AGE-RELATED OSTEOPOROSIS WITHOUT CURRENT PATHOLOGICAL FRACTURE: ICD-10-CM

## 2025-07-31 RX ORDER — DIMENHYDRINATE 50 MG
200 TABLET ORAL DAILY
Qty: 60 CAPSULE | Refills: 11 | Status: SHIPPED | OUTPATIENT
Start: 2025-07-31

## 2025-07-31 NOTE — PROGRESS NOTES
Subjective     Patient ID: Tiffany Martinez is a 69 y.o. female. Patient is here for management of multiple medical problems.     Chief Complaint   Patient presents with    Osteoporosis     6 month f/u     History of Present Illness   Osteopriosis  Has apt with christiano schreiber.  Has  a dose of prolia.    Start swats and jump squats.    Siatic nerve pain on right.   Pain in butox.  Stretching in the area.      Muslce pain in arms and legs. Falling. On crestor   Will stop and add.           The following portions of the patient's history were reviewed and updated as appropriate: allergies, current medications, past family history, past medical history, past social history, past surgical history and problem list.    Review of Systems    Current Outpatient Medications:     amLODIPine (NORVASC) 5 MG tablet, Take 1 tablet by mouth Daily., Disp: , Rfl:     ciclopirox (PENLAC) 8 % solution, APPLY ONCE EVERY DAY TO TOENAILS, Disp: , Rfl:     Diclofenac Sodium (VOLTAREN) 1 % gel gel, Apply  topically to the appropriate area as directed 4 (Four) Times a Day As Needed (joint pain)., Disp: 100 g, Rfl: 5    ENBREL SURECLICK 50 MG/ML solution auto-injector, Inject 50 mg under the skin every 7 days., Disp: , Rfl:     folic acid (FOLVITE) 1 MG tablet, Take 5 tablets by mouth Daily., Disp: 450 tablet, Rfl: 3    Linzess 145 MCG capsule capsule, Take 1 capsule by mouth Daily., Disp: , Rfl:     meloxicam (MOBIC) 15 MG tablet, Take 1 tablet by mouth Daily As Needed for Mild Pain., Disp: 90 tablet, Rfl: 1    methotrexate 2.5 MG tablet, TAKE 10 TABLETS BY MOUTH EVERY WEEK, Disp: 120 tablet, Rfl: 0    metoprolol succinate XL (TOPROL-XL) 50 MG 24 hr tablet, Take 1 tablet by mouth Daily., Disp: 30 tablet, Rfl: 9    omeprazole (priLOSEC) 40 MG capsule, Take 1 capsule by mouth Daily., Disp: , Rfl:     pantoprazole (PROTONIX) 40 MG EC tablet, Take 1 tablet by mouth Daily., Disp: , Rfl:     pilocarpine (SALAGEN) 5 MG tablet, TAKE 1 TABLET BY MOUTH  "THREE TIMES DAILY AS NEEDED FOR DRYNESS, Disp: 270 tablet, Rfl: 0    rosuvastatin (CRESTOR) 5 MG tablet, TAKE 1 TABLET BY MOUTH DAILY, Disp: 90 tablet, Rfl: 1    Sodium Fluoride 5000 Plus 1.1 % cream, USE TO BRUSH TEETH EVERY DAY, Disp: , Rfl:     tiZANidine (ZANAFLEX) 2 MG tablet, Take 1 tablet by mouth Every 8 (Eight) Hours As Needed for Muscle Spasms., Disp: 30 tablet, Rfl: 0    chlorhexidine (PERIDEX) 0.12 % solution, , Disp: , Rfl:     Coenzyme Q10 (Co Q-10) 100 MG capsule, Take 200 mg by mouth Daily., Disp: 60 capsule, Rfl: 11    hydroxychloroquine (PLAQUENIL) 200 MG tablet, Take 1 tablet by mouth 2 (Two) Times a Day. (Patient not taking: Reported on 7/31/2025), Disp: 60 tablet, Rfl: 5    venlafaxine XR (EFFEXOR-XR) 37.5 MG 24 hr capsule, TAKE 1 CAPSULE BY MOUTH DAILY (Patient not taking: Reported on 7/31/2025), Disp: 90 capsule, Rfl: 0    Objective      Blood pressure 142/84, pulse 58, temperature 97.3 °F (36.3 °C), temperature source Temporal, height 162.6 cm (64.02\"), weight 70.3 kg (155 lb), SpO2 99%, not currently breastfeeding.            Physical Exam     General Appearance:    Alert, cooperative, no distress, appears stated age   Head:    Normocephalic, without obvious abnormality, atraumatic   Eyes:    PERRL, conjunctiva/corneas clear, EOM's intact   Ears:    Normal TM's and external ear canals, both ears   Nose:   Nares normal, septum midline, mucosa normal, no drainage   or sinus tenderness   Throat:   Lips, mucosa, and tongue normal; teeth and gums normal   Neck:   Supple, symmetrical, trachea midline, no adenopathy;        thyroid:  No enlargement/tenderness/nodules; no carotid    bruit or JVD   Back:     Symmetric, no curvature, ROM normal, no CVA tenderness   Lungs:     Clear to auscultation bilaterally, respirations unlabored   Chest wall:    No tenderness or deformity   Heart:    Regular rate and rhythm, S1 and S2 normal, no murmur,        rub or gallop   Abdomen:     Soft, non-tender, bowel " sounds active all four quadrants,     no masses, no organomegaly   Extremities:   Extremities normal, atraumatic, no cyanosis or edema   Pulses:   2+ and symmetric all extremities   Skin:   Skin color, texture, turgor normal, no rashes or lesions   Lymph nodes:   Cervical, supraclavicular, and axillary nodes normal   Neurologic:   CNII-XII intact. Normal strength, sensation and reflexes       throughout      Results for orders placed or performed in visit on 03/25/25   CBC Auto Differential    Collection Time: 03/25/25 11:18 AM    Specimen: Blood   Result Value Ref Range    WBC 5.07 3.40 - 10.80 10*3/mm3    RBC 4.29 3.77 - 5.28 10*6/mm3    Hemoglobin 13.0 12.0 - 15.9 g/dL    Hematocrit 39.5 34.0 - 46.6 %    MCV 92.1 79.0 - 97.0 fL    MCH 30.3 26.6 - 33.0 pg    MCHC 32.9 31.5 - 35.7 g/dL    RDW 14.3 12.3 - 15.4 %    RDW-SD 47.4 37.0 - 54.0 fl    MPV 10.0 6.0 - 12.0 fL    Platelets 279 140 - 450 10*3/mm3   Comprehensive Metabolic Panel    Collection Time: 03/25/25 11:18 AM    Specimen: Blood   Result Value Ref Range    Glucose 84 65 - 99 mg/dL    BUN 15 8 - 23 mg/dL    Creatinine 0.72 0.57 - 1.00 mg/dL    Sodium 140 136 - 145 mmol/L    Potassium 4.2 3.5 - 5.2 mmol/L    Chloride 103 98 - 107 mmol/L    CO2 25.0 22.0 - 29.0 mmol/L    Calcium 9.4 8.6 - 10.5 mg/dL    Total Protein 7.6 6.0 - 8.5 g/dL    Albumin 3.8 3.5 - 5.2 g/dL    ALT (SGPT) 11 1 - 33 U/L    AST (SGOT) 22 1 - 32 U/L    Alkaline Phosphatase 102 39 - 117 U/L    Total Bilirubin 0.2 0.0 - 1.2 mg/dL    Globulin 3.8 gm/dL    A/G Ratio 1.0 g/dL    BUN/Creatinine Ratio 20.8 7.0 - 25.0    Anion Gap 12.0 5.0 - 15.0 mmol/L    eGFR 91.2 >60.0 mL/min/1.73   C-reactive Protein    Collection Time: 03/25/25 11:18 AM    Specimen: Blood   Result Value Ref Range    C-Reactive Protein 3.98 (H) 0.00 - 0.50 mg/dL   Sedimentation Rate    Collection Time: 03/25/25 11:18 AM    Specimen: Blood   Result Value Ref Range    Sed Rate 57 (H) 0 - 30 mm/hr   Vitamin B12    Collection  Time: 03/25/25 11:18 AM    Specimen: Blood   Result Value Ref Range    Vitamin B-12 616 211 - 946 pg/mL   TSH    Collection Time: 03/25/25 11:18 AM    Specimen: Blood   Result Value Ref Range    TSH 2.770 0.270 - 4.200 uIU/mL   Iron Profile    Collection Time: 03/25/25 11:18 AM    Specimen: Blood   Result Value Ref Range    Iron 66 37 - 145 mcg/dL    Iron Saturation (TSAT) 18 (L) 20 - 50 %    Transferrin 248 200 - 360 mg/dL    TIBC 370 298 - 536 mcg/dL   Ferritin    Collection Time: 03/25/25 11:18 AM    Specimen: Blood   Result Value Ref Range    Ferritin 123.00 13.00 - 150.00 ng/mL   Vitamin D,25-Hydroxy    Collection Time: 03/25/25 11:18 AM    Specimen: Blood   Result Value Ref Range    25 Hydroxy, Vitamin D 26.3 (L) 30.0 - 100.0 ng/ml   Manual Differential    Collection Time: 03/25/25 11:18 AM    Specimen: Blood   Result Value Ref Range    Neutrophil % 67.0 42.7 - 76.0 %    Lymphocyte % 21.0 19.6 - 45.3 %    Monocyte % 5.0 5.0 - 12.0 %    Eosinophil % 4.0 0.3 - 6.2 %    Basophil % 2.0 (H) 0.0 - 1.5 %    Atypical Lymphocyte % 1.0 0.0 - 5.0 %    Neutrophils Absolute 3.40 1.70 - 7.00 10*3/mm3    Lymphocytes Absolute 1.12 0.70 - 3.10 10*3/mm3    Monocytes Absolute 0.25 0.10 - 0.90 10*3/mm3    Eosinophils Absolute 0.20 0.00 - 0.40 10*3/mm3    Basophils Absolute 0.10 0.00 - 0.20 10*3/mm3    Anisocytosis Slight/1+ None Seen    Poikilocytes Slight/1+ None Seen    WBC Morphology Normal Normal    Platelet Morphology Normal Normal         Assessment & Plan     Piriformis syndrome.   Start streching and PT.    On statin low dose for primary prevention. Will have to stop as it is causing muscle pain and increasing fall risk. Had trouble on multiple statins in the past.     Will stop and start co q 10 temproally.  Will reevaluate flp off statin.      OP jump sqauts, on hardik vit d., start vit c.     Has f/u with OP specialist.             Diagnoses and all orders for this visit:    1. Piriformis syndrome of right side  (Primary)  -     Ambulatory Referral to Physical Therapy for Evaluation & Treatment  -     Lipid Panel  -     CBC & Differential  -     TSH  -     Hemoglobin A1c  -     Vitamin D,25-Hydroxy    2. Chronic pain of left knee  -     Ambulatory Referral to Physical Therapy for Evaluation & Treatment  -     Lipid Panel  -     CBC & Differential  -     TSH  -     Hemoglobin A1c  -     Vitamin D,25-Hydroxy    3. Falling  -     Ambulatory Referral to Physical Therapy for Evaluation & Treatment  -     Lipid Panel  -     CBC & Differential  -     TSH  -     Hemoglobin A1c  -     Vitamin D,25-Hydroxy    4. Statin myopathy  -     Coenzyme Q10 (Co Q-10) 100 MG capsule; Take 200 mg by mouth Daily.  Dispense: 60 capsule; Refill: 11  -     Lipid Panel  -     CBC & Differential  -     TSH  -     Hemoglobin A1c  -     Vitamin D,25-Hydroxy    5. Age-related osteoporosis without current pathological fracture  -     Lipid Panel  -     CBC & Differential  -     TSH  -     Hemoglobin A1c  -     Vitamin D,25-Hydroxy    6. Bilateral hip pain  -     Lipid Panel  -     CBC & Differential  -     TSH  -     Hemoglobin A1c  -     Vitamin D,25-Hydroxy    7. Primary hypertension  -     Lipid Panel  -     CBC & Differential  -     TSH  -     Hemoglobin A1c  -     Vitamin D,25-Hydroxy    8. Abnormal finding of blood chemistry, unspecified  -     Hemoglobin A1c      Return in about 8 months (around 3/31/2026) for Medicare Wellness, Annual physical, Recheck.          There are no Patient Instructions on file for this visit.     Santo Pinon MD    Assessment & Plan

## 2025-08-14 RX ORDER — PILOCARPINE HYDROCHLORIDE 5 MG/1
TABLET, FILM COATED ORAL
Qty: 270 TABLET | Refills: 0 | OUTPATIENT
Start: 2025-08-14

## 2025-08-25 ENCOUNTER — OFFICE VISIT (OUTPATIENT)
Age: 70
End: 2025-08-25
Payer: MEDICARE

## 2025-08-25 VITALS
TEMPERATURE: 97.7 F | WEIGHT: 155.3 LBS | HEART RATE: 78 BPM | HEIGHT: 64 IN | DIASTOLIC BLOOD PRESSURE: 78 MMHG | SYSTOLIC BLOOD PRESSURE: 120 MMHG | BODY MASS INDEX: 26.51 KG/M2

## 2025-08-25 DIAGNOSIS — M19.049 CMC ARTHRITIS: ICD-10-CM

## 2025-08-25 DIAGNOSIS — Z79.899 IMMUNOSUPPRESSION DUE TO DRUG THERAPY: ICD-10-CM

## 2025-08-25 DIAGNOSIS — M05.79 RHEUMATOID ARTHRITIS INVOLVING MULTIPLE SITES WITH POSITIVE RHEUMATOID FACTOR: Primary | ICD-10-CM

## 2025-08-25 DIAGNOSIS — Z79.899 HIGH RISK MEDICATION USE: ICD-10-CM

## 2025-08-25 DIAGNOSIS — D84.821 IMMUNOSUPPRESSION DUE TO DRUG THERAPY: ICD-10-CM

## 2025-08-25 DIAGNOSIS — M35.00 SJOGREN'S SYNDROME, WITH UNSPECIFIED ORGAN INVOLVEMENT: ICD-10-CM

## 2025-08-25 DIAGNOSIS — M81.0 OSTEOPOROSIS, UNSPECIFIED OSTEOPOROSIS TYPE, UNSPECIFIED PATHOLOGICAL FRACTURE PRESENCE: ICD-10-CM

## 2025-08-25 PROCEDURE — 1160F RVW MEDS BY RX/DR IN RCRD: CPT | Performed by: INTERNAL MEDICINE

## 2025-08-25 PROCEDURE — 3074F SYST BP LT 130 MM HG: CPT | Performed by: INTERNAL MEDICINE

## 2025-08-25 PROCEDURE — 3078F DIAST BP <80 MM HG: CPT | Performed by: INTERNAL MEDICINE

## 2025-08-25 PROCEDURE — G2211 COMPLEX E/M VISIT ADD ON: HCPCS | Performed by: INTERNAL MEDICINE

## 2025-08-25 PROCEDURE — 99214 OFFICE O/P EST MOD 30 MIN: CPT | Performed by: INTERNAL MEDICINE

## 2025-08-25 PROCEDURE — 1159F MED LIST DOCD IN RCRD: CPT | Performed by: INTERNAL MEDICINE

## 2025-08-25 RX ORDER — METHOTREXATE 2.5 MG/1
25 TABLET ORAL WEEKLY
Qty: 120 TABLET | Refills: 0 | Status: SHIPPED | OUTPATIENT
Start: 2025-08-25

## 2025-08-26 LAB
25(OH)D3+25(OH)D2 SERPL-MCNC: 30.3 NG/ML (ref 30–100)
BASOPHILS # BLD AUTO: 0 X10E3/UL (ref 0–0.2)
BASOPHILS NFR BLD AUTO: 0 %
CHOLEST SERPL-MCNC: 245 MG/DL (ref 100–199)
EOSINOPHIL # BLD AUTO: 0.3 X10E3/UL (ref 0–0.4)
EOSINOPHIL NFR BLD AUTO: 5 %
ERYTHROCYTE [DISTWIDTH] IN BLOOD BY AUTOMATED COUNT: 14.1 % (ref 11.7–15.4)
HBA1C MFR BLD: 5.8 % (ref 4.8–5.6)
HCT VFR BLD AUTO: 40.2 % (ref 34–46.6)
HDLC SERPL-MCNC: 42 MG/DL
HGB BLD-MCNC: 13 G/DL (ref 11.1–15.9)
IMM GRANULOCYTES # BLD AUTO: 0 X10E3/UL (ref 0–0.1)
IMM GRANULOCYTES NFR BLD AUTO: 0 %
LDLC SERPL CALC-MCNC: 162 MG/DL (ref 0–99)
LYMPHOCYTES # BLD AUTO: 1.5 X10E3/UL (ref 0.7–3.1)
LYMPHOCYTES NFR BLD AUTO: 21 %
MCH RBC QN AUTO: 30.5 PG (ref 26.6–33)
MCHC RBC AUTO-ENTMCNC: 32.3 G/DL (ref 31.5–35.7)
MCV RBC AUTO: 94 FL (ref 79–97)
MONOCYTES # BLD AUTO: 0.6 X10E3/UL (ref 0.1–0.9)
MONOCYTES NFR BLD AUTO: 9 %
NEUTROPHILS # BLD AUTO: 4.7 X10E3/UL (ref 1.4–7)
NEUTROPHILS NFR BLD AUTO: 65 %
PLATELET # BLD AUTO: 217 X10E3/UL (ref 150–450)
RBC # BLD AUTO: 4.26 X10E6/UL (ref 3.77–5.28)
TRIGL SERPL-MCNC: 219 MG/DL (ref 0–149)
TSH SERPL DL<=0.005 MIU/L-ACNC: 2.65 UIU/ML (ref 0.45–4.5)
VLDLC SERPL CALC-MCNC: 41 MG/DL (ref 5–40)
WBC # BLD AUTO: 7.2 X10E3/UL (ref 3.4–10.8)

## 2025-08-27 ENCOUNTER — RESULTS FOLLOW-UP (OUTPATIENT)
Dept: INTERNAL MEDICINE | Facility: CLINIC | Age: 70
End: 2025-08-27
Payer: MEDICARE